# Patient Record
Sex: MALE | Race: WHITE | Employment: OTHER | URBAN - METROPOLITAN AREA
[De-identification: names, ages, dates, MRNs, and addresses within clinical notes are randomized per-mention and may not be internally consistent; named-entity substitution may affect disease eponyms.]

---

## 2018-02-28 ENCOUNTER — OFFICE VISIT (OUTPATIENT)
Dept: INTERNAL MEDICINE CLINIC | Age: 63
End: 2018-02-28

## 2018-02-28 VITALS — HEART RATE: 72 BPM | SYSTOLIC BLOOD PRESSURE: 122 MMHG | DIASTOLIC BLOOD PRESSURE: 82 MMHG | HEIGHT: 64 IN

## 2018-02-28 DIAGNOSIS — M10.9 GOUT WITHOUT TOPHUS: ICD-10-CM

## 2018-02-28 DIAGNOSIS — M48.062 SPINAL STENOSIS OF LUMBAR REGION WITH NEUROGENIC CLAUDICATION: Primary | ICD-10-CM

## 2018-02-28 DIAGNOSIS — K21.9 GASTROESOPHAGEAL REFLUX DISEASE WITHOUT ESOPHAGITIS: ICD-10-CM

## 2018-02-28 DIAGNOSIS — Z12.11 COLON CANCER SCREENING: ICD-10-CM

## 2018-02-28 DIAGNOSIS — Z12.5 PROSTATE CANCER SCREENING: ICD-10-CM

## 2018-02-28 DIAGNOSIS — G25.81 RESTLESS LEG: ICD-10-CM

## 2018-02-28 DIAGNOSIS — Z79.4 TYPE 2 DIABETES MELLITUS WITHOUT COMPLICATION, WITH LONG-TERM CURRENT USE OF INSULIN (HCC): ICD-10-CM

## 2018-02-28 DIAGNOSIS — I10 ESSENTIAL HYPERTENSION: ICD-10-CM

## 2018-02-28 DIAGNOSIS — F43.21 GRIEF: ICD-10-CM

## 2018-02-28 DIAGNOSIS — E78.5 HYPERLIPIDEMIA LDL GOAL <70: ICD-10-CM

## 2018-02-28 DIAGNOSIS — E11.9 TYPE 2 DIABETES MELLITUS WITHOUT COMPLICATION, WITH LONG-TERM CURRENT USE OF INSULIN (HCC): ICD-10-CM

## 2018-02-28 DIAGNOSIS — Z79.899 HIGH RISK MEDICATION USE: ICD-10-CM

## 2018-02-28 DIAGNOSIS — F32.9 REACTIVE DEPRESSION: ICD-10-CM

## 2018-02-28 DIAGNOSIS — M54.12 CERVICAL RADICULAR PAIN: ICD-10-CM

## 2018-02-28 DIAGNOSIS — D37.3 APPENDICEAL TUMOR: ICD-10-CM

## 2018-02-28 DIAGNOSIS — M50.30 DEGENERATIVE CERVICAL DISC: ICD-10-CM

## 2018-02-28 DIAGNOSIS — M51.36 DEGENERATIVE DISC DISEASE, LUMBAR: ICD-10-CM

## 2018-02-28 DIAGNOSIS — Z02.71 DISABILITY EXAMINATION: ICD-10-CM

## 2018-02-28 PROCEDURE — 99214 OFFICE O/P EST MOD 30 MIN: CPT | Performed by: INTERNAL MEDICINE

## 2018-02-28 RX ORDER — ROPINIROLE 1 MG/1
1 TABLET, FILM COATED ORAL 3 TIMES DAILY
COMMUNITY
End: 2019-04-08 | Stop reason: SDUPTHER

## 2018-02-28 RX ORDER — PHENOL 1.4 %
1 AEROSOL, SPRAY (ML) MUCOUS MEMBRANE DAILY
COMMUNITY

## 2018-02-28 RX ORDER — OMEPRAZOLE 40 MG/1
40 CAPSULE, DELAYED RELEASE ORAL DAILY
COMMUNITY
End: 2019-08-16 | Stop reason: SDUPTHER

## 2018-02-28 ASSESSMENT — PATIENT HEALTH QUESTIONNAIRE - PHQ9
SUM OF ALL RESPONSES TO PHQ QUESTIONS 1-9: 2
2. FEELING DOWN, DEPRESSED OR HOPELESS: 1
SUM OF ALL RESPONSES TO PHQ9 QUESTIONS 1 & 2: 2
1. LITTLE INTEREST OR PLEASURE IN DOING THINGS: 1

## 2018-02-28 ASSESSMENT — ENCOUNTER SYMPTOMS
CONSTIPATION: 0
DIARRHEA: 0
BACK PAIN: 1
SHORTNESS OF BREATH: 0
CHEST TIGHTNESS: 0

## 2018-02-28 NOTE — PROGRESS NOTES
Patient: Carol Li is a 57 y.o. male who presents today with the following Chief Complaint(s):  Chief Complaint   Patient presents with    Established New Doctor       56 y/o male comes in today to establish w/new practice. Well known to me from [facility]. He has not been seen in over 1 year as he has been caring for his wife with stage IV metastatic breast cancer with mets to brain who  on 18. He is dealing with his grief, has good support from friends and family. Is starting to return to hobbies. He became certified as a gun smith about 1 year ago and had stopped doing that the last few months of his wife's life but has recently started up again. He has not been doing his art. He had lost his appetite and lost quite a bit of weight during her illness, has some improvement in appetite. He is sad but doesn't feel he is any more depressed than he has been in the past. He denies feeling suicidal or thoughts of self harm. He does try to isolate himself but has friends who will not let him. He does not feel like he needs counseling. His biggest complaint today is that of low back pain. He needed to do a lot of moving/lifting of his wife in the last several weeks of her life and subsequently has been having some increased back pain bilaterally but right worse than left. Pain is burning/aching in nature with sharp pain in his right buttocks with radiation down into his right leg. No weakness. Has difficulty sitting and standing for prolonged periods of time (able to sit for about 1 hour but needs to shift frequently, able to stand for about 30 minutes, unable to comfortably lift more than  20 pounds). Pain interferes with his sleep. Prior to his wife's death, he was sleeping in his recliner. He now  Is trying to sleep in bed and finds that he is most comfortable on his right side. Does not take anything for pain, including OTC NSAIDs or Tylenol, and does not want anything for pain.  Pain is also exacerbated current facility-administered medications for this visit. Patient's past medical history, surgical history, family history, medications,  and allergies  were all reviewed and updated as appropriate today. Review of Systems   Constitutional: Positive for appetite change. Negative for fatigue and fever. Respiratory: Negative for chest tightness and shortness of breath. Cardiovascular: Negative for chest pain. Gastrointestinal: Negative for constipation and diarrhea. Musculoskeletal: Positive for back pain and neck pain. Skin: Negative for rash. Neurological: Positive for weakness and numbness. Psychiatric/Behavioral: Positive for dysphoric mood and sleep disturbance. Negative for agitation, confusion, self-injury and suicidal ideas. /82   Pulse 72   Ht 5' 4\" (1.626 m)   Physical Exam   Constitutional: He is oriented to person, place, and time. He appears well-developed and well-nourished. He is cooperative. He does not appear ill. No distress. HENT:   Right Ear: External ear normal.   Left Ear: External ear normal.   Nose: Nose normal.   Mouth/Throat: Oropharynx is clear and moist and mucous membranes are normal.   Eyes: Conjunctivae and EOM are normal. Pupils are equal, round, and reactive to light. Right eye exhibits no discharge. Left eye exhibits no discharge. No scleral icterus. Neck: Normal range of motion. Neck supple. Carotid bruit is not present. No thyroid mass and no thyromegaly present. Cardiovascular: Normal rate, regular rhythm, normal heart sounds and intact distal pulses. No murmur heard. Pulmonary/Chest: Effort normal. He has no wheezes. He has no rales. He exhibits no tenderness. Abdominal: Soft. Bowel sounds are normal. He exhibits no mass. There is no tenderness. Musculoskeletal: He exhibits no edema. Cervical back: He exhibits normal range of motion and no tenderness.         Lumbar back: He exhibits decreased range of motion and regarding these symptoms. No recent injury. Degenerative disc disease, lumbar     S/P microdiscectomy with Dr. Shelby Rao  7/3/14. Still has pain with radiation into right leg. Limits his ability to lift and remain seated or standing for prolonged periods of time. Pain does interfere with sleep. Declines pain meds. Recommend Aleve 2 pills twice a day and Tylenol (acetaminophen ) Extra Strength 500 mg 2 pills 3 x a day for pain. Essential hypertension     Well controlled on Diovan 320 mg po qd. No changes. Check CMP today. Relevant Medications    rosuvastatin (CRESTOR) 40 MG tablet    valsartan (DIOVAN) 320 MG tablet    fenofibrate (TRICOR) 145 MG tablet    Other Relevant Orders    CBC WITH AUTO DIFFERENTIAL    MAGNESIUM    VITAMIN B12    COMPREHENSIVE METABOLIC PANEL    Gastroesophageal reflux disease without esophagitis     Well controlled on omeprazole 40 mg po qd. Will check Mg and B12 level with labs today. Relevant Medications    omeprazole (PRILOSEC) 40 MG delayed release capsule    Gout without tophus     No recent flairs. Check uric acid level. Has been on allopurinol in the past. Initial presentation was in . Relevant Orders    URIC ACID    Grief     Wife, Lauren Diaz,  after 3 year jones with metastatic (liver, lung, bone, and ultimately brain) breast cancer (diagnosed 1/15,  18). Support given. Reviewed stages of grief. Sounds to be handling his grief appropriately. Encouraged to seek additional support if needed. High risk medication use     Check Mg and B12 d/t omeprazole, CMP d/t Crestor, Tricor, and Invoakamet. Hyperlipidemia LDL goal <70     Remains on Crestor 40 mg po qd and Tricor 145 mg po qd. Will check CMP and lipid panel today.          Relevant Medications    rosuvastatin (CRESTOR) 40 MG tablet    valsartan (DIOVAN) 320 MG tablet    fenofibrate (TRICOR) 145 MG tablet    Other Relevant Orders    LIPID PANEL    LIPID PANEL

## 2018-02-28 NOTE — PATIENT INSTRUCTIONS
or jaundice (yellowing of your skin or eyes). In rare cases, acetaminophen may cause a severe skin reaction. Stop taking this medicine and call your doctor right away if you have skin redness or a rash that spreads and causes blistering and peeling. What is acetaminophen? There are many brands and forms of acetaminophen available. Not all brands are listed on this leaflet. Acetaminophen is a pain reliever and a fever reducer. Acetaminophen is used to treat many conditions such as headache, muscle aches, arthritis, backache, toothaches, colds, and fevers. Acetaminophen may also be used for purposes not listed in this medication guide. What should I discuss with my healthcare provider before taking acetaminophen? You should not take acetaminophen if you are allergic to it, or if you have severe liver disease. Do not take acetaminophen without a doctor's advice if you have ever had alcoholic liver disease (cirrhosis) or if you drink more than 3 alcoholic beverages per day. You may not be able to take acetaminophen. Your doctor will determine whether acetaminophen is safe for you to use during pregnancy. Do not use this medicine without the advice of your doctor if you are pregnant. Acetaminophen can pass into breast milk and may harm a nursing baby. Tell your doctor if you are breast-feeding a baby. Do not give this medicine to a child younger than 3years old without the advice of a doctor. How should I take acetaminophen? Use exactly as directed on the label, or as prescribed by your doctor. Do not use in larger or smaller amounts or for longer than recommended. Do not take more of this medication than is recommended. An overdose of acetaminophen can damage your liver or cause death. Measure liquid medicine  with the dosing syringe provided, or with a special dose-measuring spoon or medicine cup. If you do not have a dose-measuring device, ask your pharmacist for one.   If you are treating a child, use a pediatric form of acetaminophen. Use only the special dose-measuring dropper or oral syringe that comes with the specific pediatric form you are using. Carefully follow the dosing directions on the medicine label. Acetaminophen made for infants is available in two different dose concentrations, and each concentration comes with its own medicine dropper or oral syringe. These dosing devices are not equal between the different concentrations. Using the wrong device may cause you to give your child an overdose of acetaminophen. Never mix and match dosing devices between infant formulations of acetaminophen. You may need to shake the liquid before each use. Follow the directions on the medicine label. The chewable tablet must be chewed thoroughly before you swallow it. Make sure your hands are dry when handling the acetaminophen disintegrating tablet. Place the tablet on your tongue. It will begin to dissolve right away. Do not swallow the tablet whole. Allow it to dissolve in your mouth without chewing. To use the acetaminophen effervescent granules, dissolve one packet of the granules in at least 4 ounces of water. Stir this mixture and drink all of it right away. To make sure you get the entire dose, add a little more water to the same glass, swirl gently and drink right away. Stop taking acetaminophen and call your doctor if:  · you still have a fever after 3 days of use;  · you still have pain after 7 days of use (or 5 days if treating a child);  · you have a skin rash, ongoing headache, or any redness or swelling; or  · if your symptoms get worse, or if you have any new symptoms. This medication can cause unusual results with certain lab tests for glucose (sugar) in the urine. Tell any doctor who treats you that you are using acetaminophen. Store at room temperature away from heat and moisture. What happens if I miss a dose?   Since acetaminophen is taken as needed, you may not be on a dosing also be used for purposes not listed in this medication guide. What should I discuss with my healthcare provider before taking acetaminophen? You should not take acetaminophen if you are allergic to it, or if you have severe liver disease. Do not take acetaminophen without a doctor's advice if you have ever had alcoholic liver disease (cirrhosis) or if you drink more than 3 alcoholic beverages per day. You may not be able to take acetaminophen. Your doctor will determine whether acetaminophen is safe for you to use during pregnancy. Do not use this medicine without the advice of your doctor if you are pregnant. Acetaminophen can pass into breast milk and may harm a nursing baby. Tell your doctor if you are breast-feeding a baby. Do not give this medicine to a child younger than 3years old without the advice of a doctor. How should I take acetaminophen? Use exactly as directed on the label, or as prescribed by your doctor. Do not use in larger or smaller amounts or for longer than recommended. Do not take more of this medication than is recommended. An overdose of acetaminophen can damage your liver or cause death. Measure liquid medicine  with the dosing syringe provided, or with a special dose-measuring spoon or medicine cup. If you do not have a dose-measuring device, ask your pharmacist for one. If you are treating a child, use a pediatric form of acetaminophen. Use only the special dose-measuring dropper or oral syringe that comes with the specific pediatric form you are using. Carefully follow the dosing directions on the medicine label. Acetaminophen made for infants is available in two different dose concentrations, and each concentration comes with its own medicine dropper or oral syringe. These dosing devices are not equal between the different concentrations. Using the wrong device may cause you to give your child an overdose of acetaminophen.  Never mix and match dosing devices between infant formulations of acetaminophen. You may need to shake the liquid before each use. Follow the directions on the medicine label. The chewable tablet must be chewed thoroughly before you swallow it. Make sure your hands are dry when handling the acetaminophen disintegrating tablet. Place the tablet on your tongue. It will begin to dissolve right away. Do not swallow the tablet whole. Allow it to dissolve in your mouth without chewing. To use the acetaminophen effervescent granules, dissolve one packet of the granules in at least 4 ounces of water. Stir this mixture and drink all of it right away. To make sure you get the entire dose, add a little more water to the same glass, swirl gently and drink right away. Stop taking acetaminophen and call your doctor if:  · you still have a fever after 3 days of use;  · you still have pain after 7 days of use (or 5 days if treating a child);  · you have a skin rash, ongoing headache, or any redness or swelling; or  · if your symptoms get worse, or if you have any new symptoms. This medication can cause unusual results with certain lab tests for glucose (sugar) in the urine. Tell any doctor who treats you that you are using acetaminophen. Store at room temperature away from heat and moisture. What happens if I miss a dose? Since acetaminophen is taken as needed, you may not be on a dosing schedule. If you are taking the medication regularly, take the missed dose as soon as you remember. Skip the missed dose if it is almost time for your next scheduled dose. Do not take extra medicine to make up the missed dose. What happens if I overdose? Seek emergency medical attention or call the Poison Help line at 1-786.661.7878. An overdose of acetaminophen can be fatal.   The first signs of an acetaminophen overdose include loss of appetite, nausea, vomiting, stomach pain, sweating, and confusion or weakness.  Later symptoms may include pain in your upper stomach, dark urine, and yellowing of your skin or the whites of your eyes. What should I avoid while taking acetaminophen? Ask a doctor or pharmacist before using any other cold, allergy, pain, or sleep medication. Acetaminophen (sometimes abbreviated as APAP) is contained in many combination medicines. Taking certain products together can cause you to get too much acetaminophen which can lead to a fatal overdose. Check the label to see if a medicine contains acetaminophen or APAP. Avoid drinking alcohol. It may increase your risk of liver damage while taking acetaminophen. What are the possible side effects of acetaminophen? Get emergency medical help if you have signs of an allergic reaction:  hives; difficulty breathing; swelling of your face, lips, tongue, or throat. In rare cases, acetaminophen may cause a severe skin reaction that can be fatal. This could occur even if you have taken acetaminophen in the past and had no reaction. Stop taking this medicine and call your doctor right away if you have skin redness or a rash that spreads and causes blistering and peeling. If you have this type of reaction, you should never again take any medicine that contains acetaminophen. Stop taking acetaminophen and call your doctor at once if you have:  · nausea, upper stomach pain, itching, loss of appetite;  · dark urine, gera-colored stools; or  · jaundice (yellowing of the skin or eyes). This is not a complete list of side effects and others may occur. Call your doctor for medical advice about side effects. You may report side effects to FDA at 1-489-FDA-3331. What other drugs will affect acetaminophen? Other drugs may interact with acetaminophen, including prescription and over-the-counter medicines, vitamins, and herbal products. Tell each of your health care providers about all medicines you use now and any medicine you start or stop using. Where can I get more information?   Your pharmacist can provide more information about

## 2018-03-02 RX ORDER — ROSUVASTATIN CALCIUM 40 MG/1
1 TABLET, COATED ORAL DAILY
COMMUNITY
Start: 2018-01-30 | End: 2019-04-08 | Stop reason: SDUPTHER

## 2018-03-02 RX ORDER — GLIPIZIDE 10 MG/1
1 TABLET, FILM COATED, EXTENDED RELEASE ORAL
COMMUNITY
Start: 2017-12-17 | End: 2018-12-07 | Stop reason: SINTOL

## 2018-03-02 RX ORDER — VALSARTAN 320 MG/1
1 TABLET ORAL DAILY
COMMUNITY
Start: 2018-01-30 | End: 2019-04-08 | Stop reason: SDUPTHER

## 2018-03-02 RX ORDER — FENOFIBRATE 145 MG/1
145 TABLET, COATED ORAL DAILY
Qty: 30 TABLET | Refills: 3
Start: 2018-03-02 | End: 2019-11-05 | Stop reason: ALTCHOICE

## 2018-03-02 RX ORDER — INSULIN GLARGINE 100 [IU]/ML
48 INJECTION, SOLUTION SUBCUTANEOUS NIGHTLY
COMMUNITY
Start: 2017-11-27 | End: 2018-08-07 | Stop reason: SDUPTHER

## 2018-03-02 RX ORDER — CITALOPRAM 40 MG/1
1 TABLET ORAL DAILY
COMMUNITY
Start: 2018-01-30 | End: 2019-04-08 | Stop reason: SDUPTHER

## 2018-03-02 NOTE — ASSESSMENT & PLAN NOTE
Overdue for labs. Will check HbA1C and CMP today. Remains on Lantus 44 units qhs, Invokamet 150/1000 mg po bid, Victoza 1.8 mg qd qd, and glipizide XL 10 mg 2 po qd.

## 2018-03-03 PROBLEM — M10.9 GOUT WITHOUT TOPHUS: Status: RESOLVED | Noted: 2018-02-28 | Resolved: 2018-03-03

## 2018-03-03 PROBLEM — Z02.71 DISABILITY EXAMINATION: Status: ACTIVE | Noted: 2018-03-03

## 2018-03-03 NOTE — ASSESSMENT & PLAN NOTE
Is mostly symptom-free but is having some positional symptoms of paresthesias in neck and hand and weak . Is really unable to lift more than 1 gallon of mild w/out difficulty. S/P cervical discectomy and fusion (11/13- C4-5 and 11/14)  x 2 and removal of hardware w/bone grafting (1/17) with Dr. Emily Elam. I have encouraged him to get in contact with Dr. Emily Elam regarding these symptoms. No recent injury.

## 2018-03-03 NOTE — ASSESSMENT & PLAN NOTE
Last colonoscopy was in October 2015. He will call Dr. Neda Heath office to see when next one is due.

## 2018-04-11 PROBLEM — Z12.5 PROSTATE CANCER SCREENING: Status: RESOLVED | Noted: 2018-02-28 | Resolved: 2018-04-11

## 2018-04-11 PROBLEM — Z12.11 COLON CANCER SCREENING: Status: RESOLVED | Noted: 2018-02-28 | Resolved: 2018-04-11

## 2018-08-07 ENCOUNTER — OFFICE VISIT (OUTPATIENT)
Dept: INTERNAL MEDICINE CLINIC | Age: 63
End: 2018-08-07

## 2018-08-07 VITALS
DIASTOLIC BLOOD PRESSURE: 78 MMHG | HEART RATE: 64 BPM | WEIGHT: 183.4 LBS | BODY MASS INDEX: 31.48 KG/M2 | SYSTOLIC BLOOD PRESSURE: 136 MMHG | RESPIRATION RATE: 12 BRPM

## 2018-08-07 DIAGNOSIS — E78.5 HYPERLIPIDEMIA LDL GOAL <70: ICD-10-CM

## 2018-08-07 DIAGNOSIS — F43.21 GRIEF: ICD-10-CM

## 2018-08-07 DIAGNOSIS — Z91.14 NON COMPLIANCE W MEDICATION REGIMEN: ICD-10-CM

## 2018-08-07 DIAGNOSIS — Z79.4 TYPE 2 DIABETES MELLITUS WITHOUT COMPLICATION, WITH LONG-TERM CURRENT USE OF INSULIN (HCC): Primary | ICD-10-CM

## 2018-08-07 DIAGNOSIS — E11.9 TYPE 2 DIABETES MELLITUS WITHOUT COMPLICATION, WITH LONG-TERM CURRENT USE OF INSULIN (HCC): Primary | ICD-10-CM

## 2018-08-07 DIAGNOSIS — F32.9 REACTIVE DEPRESSION: ICD-10-CM

## 2018-08-07 DIAGNOSIS — M10.9 GOUT WITHOUT TOPHUS: ICD-10-CM

## 2018-08-07 DIAGNOSIS — I10 ESSENTIAL HYPERTENSION: ICD-10-CM

## 2018-08-07 DIAGNOSIS — G25.81 RESTLESS LEG: ICD-10-CM

## 2018-08-07 LAB — HBA1C MFR BLD: 13.4 %

## 2018-08-07 PROCEDURE — 1036F TOBACCO NON-USER: CPT | Performed by: INTERNAL MEDICINE

## 2018-08-07 PROCEDURE — 1101F PT FALLS ASSESS-DOCD LE1/YR: CPT | Performed by: INTERNAL MEDICINE

## 2018-08-07 PROCEDURE — 3017F COLORECTAL CA SCREEN DOC REV: CPT | Performed by: INTERNAL MEDICINE

## 2018-08-07 PROCEDURE — 4040F PNEUMOC VAC/ADMIN/RCVD: CPT | Performed by: INTERNAL MEDICINE

## 2018-08-07 PROCEDURE — G8427 DOCREV CUR MEDS BY ELIG CLIN: HCPCS | Performed by: INTERNAL MEDICINE

## 2018-08-07 PROCEDURE — 99214 OFFICE O/P EST MOD 30 MIN: CPT | Performed by: INTERNAL MEDICINE

## 2018-08-07 PROCEDURE — G8417 CALC BMI ABV UP PARAM F/U: HCPCS | Performed by: INTERNAL MEDICINE

## 2018-08-07 PROCEDURE — 3046F HEMOGLOBIN A1C LEVEL >9.0%: CPT | Performed by: INTERNAL MEDICINE

## 2018-08-07 PROCEDURE — 2022F DILAT RTA XM EVC RTNOPTHY: CPT | Performed by: INTERNAL MEDICINE

## 2018-08-07 PROCEDURE — 1123F ACP DISCUSS/DSCN MKR DOCD: CPT | Performed by: INTERNAL MEDICINE

## 2018-08-07 PROCEDURE — 83036 HEMOGLOBIN GLYCOSYLATED A1C: CPT | Performed by: INTERNAL MEDICINE

## 2018-08-07 RX ORDER — INSULIN GLARGINE 100 [IU]/ML
10 INJECTION, SOLUTION SUBCUTANEOUS NIGHTLY
Qty: 15 PEN | Refills: 3 | Status: SHIPPED | OUTPATIENT
Start: 2018-08-07 | End: 2019-10-22 | Stop reason: SDUPTHER

## 2018-08-07 ASSESSMENT — ENCOUNTER SYMPTOMS
CHEST TIGHTNESS: 0
SHORTNESS OF BREATH: 0
CONSTIPATION: 0
DIARRHEA: 0

## 2018-08-07 NOTE — PROGRESS NOTES
Patient: Artemio Jackman is a 70 y.o. male who presents today with the following Chief Complaint(s):  Chief Complaint   Patient presents with    3 Month Follow-Up    Diabetes       Here for follow up- has lost 18 pounds since February- in part intentional and in part grief. Forgets to eat when he gets busy working. 1. DM- sugars have been \"horrible\"- has not been taking his medications regularly. Has had to travel to [place] and forgot his medications. When he does remember to use his medications, sugars are getter. 2. Grief- really struggling but is doing better. Crying less. Has supportive friends in . [place] [address]. Has been getting support. 3. Hyperlipidemia- LDL was 112; has not been taking his medication regularly. 4. HTN- not checking his blood pressure  5. Anxiety and depression- doing ok with Celexa  6. RLS- well controlled with Requip    Back and neck are doing ok for the most part. Did go on a roller coaster recently and that did aggravate his neck and gave him a headache. As long as he is not lifting he does ok. Is having difficulty remembering to take his medications, typically takes them all at once at bedtime and this works for him. [facility] \" a [facility]\". Labs reviewed from [facility]: creatinine 0.7 ; LFT's normal. Total cholesterol 201; ; HDL 42; ; uric acid 6.2; fasting 193; HbA1c not done. No Known Allergies   Past Medical History:   Diagnosis Date    Appendiceal tumor     Asthma     Cervical spinal stenosis     Degenerative disc disease, lumbar     history of herniated disc at L4-5 with L5 radiculopathy.  pt had L4-5 discectomy with Dr Jennifer Gagnon    Diabetes mellitus ([facility] [address]     Esophageal reflux     Gout     Gross hematuria     Hand cramp     Hemorrhoid     Hepatitis     High blood pressure     Hyperlipidemia     Lumbar herniated disc     Major depressive disorder, recurrent episode, mild (HCC)     Postnasal drip     Restless leg syndrome     absent  Skin lesions/pre-ulcerative calluses: absent  Edema: right- negative, left- negative    Sensory exam:  Monofilament sensation: normal  (minimum of 5 random plantar locations tested, avoiding callused areas - > 1 area with absence of sensation is + for neuropathy)    Pulses: normal- 2+/4 DP      ASSESSMENT/PLAN:    Problem List Items Addressed This Visit     Type 2 diabetes mellitus without complication, with long-term current use of insulin ([facility] [address] - Primary     Overdue for labs. Will check HbA1C and CMP today. Has been non-compliant with medications, not taking Lantus 48 units qhs for several months; Invokamet 150/1000 mg po bid, Victoza 1.8 mg qd qd, and glipizide XL 10 mg 2 po qd have been intermittent. He has lost 18 pounds since February so hopefully this will help with his diabetes control. He does also tend to take all of his medication at bedtime. I have given him a schedule and discussed the importance of taking diabetes medication with food to avoid hypoglycemia. Relevant Medications    LANTUS SOLOSTAR 100 UNIT/ML injection pen    Liraglutide (VICTOZA) 18 MG/3ML SOPN SC injection    Other Relevant Orders    POCT glycosylated hemoglobin (Hb A1C) (Completed)    Hemoglobin A1C    Comprehensive Metabolic Panel    Microalbumin / Creatinine Urine Ratio    Essential hypertension     Well controlled on Diovan 320 mg po qd. Hyperlipidemia LDL goal <70     Remains on Crestor 40 mg po qd and Tricor 145 mg po qd. Will check CMP and lipid panel today. Has been intermittently compliant. Relevant Orders    Lipid Panel    Grief     Doing a little better. Support given. Is impacting his compliance with medication. May consider counseling if his grief continues to impact his self-care. Has also lost 18 pounds since late-February, in part due to not eating well and also likely related to poorly controlled diabetes. Restless leg     Well controlled on Requip.  Likely relates also

## 2018-08-12 PROBLEM — Z91.14 NON COMPLIANCE W MEDICATION REGIMEN: Status: ACTIVE | Noted: 2018-08-12

## 2018-08-12 PROBLEM — Z91.148 NON COMPLIANCE W MEDICATION REGIMEN: Status: ACTIVE | Noted: 2018-08-12

## 2018-08-12 NOTE — ASSESSMENT & PLAN NOTE
Doing ok now but is compounded by his grief. He has found activities that do help him. He has been non-compliant with medication and self-care as part of his depression and grief. Continue Celexa 40 mg po qd.

## 2018-08-12 NOTE — ASSESSMENT & PLAN NOTE
Overdue for labs. Will check HbA1C and CMP today. Has been non-compliant with medications, not taking Lantus 48 units qhs for several months; Invokamet 150/1000 mg po bid, Victoza 1.8 mg qd qd, and glipizide XL 10 mg 2 po qd have been intermittent. He has lost 18 pounds since February so hopefully this will help with his diabetes control. He does also tend to take all of his medication at bedtime. I have given him a schedule and discussed the importance of taking diabetes medication with food to avoid hypoglycemia.

## 2018-08-12 NOTE — ASSESSMENT & PLAN NOTE
Remains on Crestor 40 mg po qd and Tricor 145 mg po qd. Will check CMP and lipid panel today. Has been intermittently compliant.

## 2018-12-07 ENCOUNTER — OFFICE VISIT (OUTPATIENT)
Dept: INTERNAL MEDICINE CLINIC | Age: 63
End: 2018-12-07
Payer: MEDICARE

## 2018-12-07 VITALS
HEART RATE: 78 BPM | DIASTOLIC BLOOD PRESSURE: 76 MMHG | WEIGHT: 196 LBS | RESPIRATION RATE: 12 BRPM | BODY MASS INDEX: 33.64 KG/M2 | SYSTOLIC BLOOD PRESSURE: 138 MMHG

## 2018-12-07 DIAGNOSIS — Z23 NEED FOR VACCINATION AGAINST STREPTOCOCCUS PNEUMONIAE USING PNEUMOCOCCAL CONJUGATE VACCINE 13: ICD-10-CM

## 2018-12-07 DIAGNOSIS — E11.9 TYPE 2 DIABETES MELLITUS WITHOUT COMPLICATION, WITH LONG-TERM CURRENT USE OF INSULIN (HCC): Primary | ICD-10-CM

## 2018-12-07 DIAGNOSIS — F32.9 REACTIVE DEPRESSION: ICD-10-CM

## 2018-12-07 DIAGNOSIS — E78.5 HYPERLIPIDEMIA LDL GOAL <70: ICD-10-CM

## 2018-12-07 DIAGNOSIS — I10 ESSENTIAL HYPERTENSION: ICD-10-CM

## 2018-12-07 DIAGNOSIS — D37.3 APPENDICEAL TUMOR: ICD-10-CM

## 2018-12-07 DIAGNOSIS — G47.33 OSA (OBSTRUCTIVE SLEEP APNEA): ICD-10-CM

## 2018-12-07 DIAGNOSIS — F43.21 GRIEF: ICD-10-CM

## 2018-12-07 DIAGNOSIS — Z12.5 PROSTATE CANCER SCREENING: ICD-10-CM

## 2018-12-07 DIAGNOSIS — Z79.4 TYPE 2 DIABETES MELLITUS WITHOUT COMPLICATION, WITH LONG-TERM CURRENT USE OF INSULIN (HCC): Primary | ICD-10-CM

## 2018-12-07 DIAGNOSIS — Z91.14 NON COMPLIANCE W MEDICATION REGIMEN: ICD-10-CM

## 2018-12-07 PROCEDURE — G8482 FLU IMMUNIZE ORDER/ADMIN: HCPCS | Performed by: INTERNAL MEDICINE

## 2018-12-07 PROCEDURE — 90670 PCV13 VACCINE IM: CPT | Performed by: INTERNAL MEDICINE

## 2018-12-07 PROCEDURE — 1036F TOBACCO NON-USER: CPT | Performed by: INTERNAL MEDICINE

## 2018-12-07 PROCEDURE — 1123F ACP DISCUSS/DSCN MKR DOCD: CPT | Performed by: INTERNAL MEDICINE

## 2018-12-07 PROCEDURE — 1101F PT FALLS ASSESS-DOCD LE1/YR: CPT | Performed by: INTERNAL MEDICINE

## 2018-12-07 PROCEDURE — 3017F COLORECTAL CA SCREEN DOC REV: CPT | Performed by: INTERNAL MEDICINE

## 2018-12-07 PROCEDURE — G0009 ADMIN PNEUMOCOCCAL VACCINE: HCPCS | Performed by: INTERNAL MEDICINE

## 2018-12-07 PROCEDURE — G8427 DOCREV CUR MEDS BY ELIG CLIN: HCPCS | Performed by: INTERNAL MEDICINE

## 2018-12-07 PROCEDURE — 99214 OFFICE O/P EST MOD 30 MIN: CPT | Performed by: INTERNAL MEDICINE

## 2018-12-07 PROCEDURE — 4040F PNEUMOC VAC/ADMIN/RCVD: CPT | Performed by: INTERNAL MEDICINE

## 2018-12-07 PROCEDURE — G8417 CALC BMI ABV UP PARAM F/U: HCPCS | Performed by: INTERNAL MEDICINE

## 2018-12-07 PROCEDURE — 2022F DILAT RTA XM EVC RTNOPTHY: CPT | Performed by: INTERNAL MEDICINE

## 2018-12-07 PROCEDURE — 3046F HEMOGLOBIN A1C LEVEL >9.0%: CPT | Performed by: INTERNAL MEDICINE

## 2018-12-07 ASSESSMENT — ENCOUNTER SYMPTOMS
SHORTNESS OF BREATH: 0
DIARRHEA: 0
CONSTIPATION: 0
CHEST TIGHTNESS: 0

## 2018-12-12 NOTE — ASSESSMENT & PLAN NOTE
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 Akin Gomes. Non-cancerous. Last colonoscopy was in October 2015. Will check with Dr. Marylou Keyes when is due for his next colonoscopy.

## 2018-12-12 NOTE — ASSESSMENT & PLAN NOTE
Patient is noncompliant with his medications. He has always had difficulties with compliance but they have been worse since his wife became ill and then . The importance of compliance has been stressed.

## 2019-02-14 ENCOUNTER — TELEPHONE (OUTPATIENT)
Dept: INTERNAL MEDICINE CLINIC | Age: 64
End: 2019-02-14

## 2019-04-08 ENCOUNTER — OFFICE VISIT (OUTPATIENT)
Dept: INTERNAL MEDICINE CLINIC | Age: 64
End: 2019-04-08
Payer: MEDICARE

## 2019-04-08 VITALS
SYSTOLIC BLOOD PRESSURE: 116 MMHG | BODY MASS INDEX: 33.37 KG/M2 | WEIGHT: 194.4 LBS | DIASTOLIC BLOOD PRESSURE: 74 MMHG | HEART RATE: 72 BPM | OXYGEN SATURATION: 98 %

## 2019-04-08 DIAGNOSIS — G25.81 RESTLESS LEG: ICD-10-CM

## 2019-04-08 DIAGNOSIS — Z79.4 TYPE 2 DIABETES MELLITUS WITHOUT COMPLICATION, WITH LONG-TERM CURRENT USE OF INSULIN (HCC): Primary | ICD-10-CM

## 2019-04-08 DIAGNOSIS — G47.33 OSA (OBSTRUCTIVE SLEEP APNEA): ICD-10-CM

## 2019-04-08 DIAGNOSIS — E78.5 HYPERLIPIDEMIA LDL GOAL <70: ICD-10-CM

## 2019-04-08 DIAGNOSIS — E11.9 TYPE 2 DIABETES MELLITUS WITHOUT COMPLICATION, WITH LONG-TERM CURRENT USE OF INSULIN (HCC): Primary | ICD-10-CM

## 2019-04-08 DIAGNOSIS — Z12.5 SCREENING FOR PROSTATE CANCER: ICD-10-CM

## 2019-04-08 DIAGNOSIS — F32.9 REACTIVE DEPRESSION: ICD-10-CM

## 2019-04-08 DIAGNOSIS — D37.3 APPENDICEAL TUMOR: ICD-10-CM

## 2019-04-08 DIAGNOSIS — F43.21 GRIEF: ICD-10-CM

## 2019-04-08 DIAGNOSIS — I10 ESSENTIAL HYPERTENSION: ICD-10-CM

## 2019-04-08 PROCEDURE — 1123F ACP DISCUSS/DSCN MKR DOCD: CPT | Performed by: INTERNAL MEDICINE

## 2019-04-08 PROCEDURE — G8417 CALC BMI ABV UP PARAM F/U: HCPCS | Performed by: INTERNAL MEDICINE

## 2019-04-08 PROCEDURE — G8427 DOCREV CUR MEDS BY ELIG CLIN: HCPCS | Performed by: INTERNAL MEDICINE

## 2019-04-08 PROCEDURE — 3046F HEMOGLOBIN A1C LEVEL >9.0%: CPT | Performed by: INTERNAL MEDICINE

## 2019-04-08 PROCEDURE — 2022F DILAT RTA XM EVC RTNOPTHY: CPT | Performed by: INTERNAL MEDICINE

## 2019-04-08 PROCEDURE — 1036F TOBACCO NON-USER: CPT | Performed by: INTERNAL MEDICINE

## 2019-04-08 PROCEDURE — 3017F COLORECTAL CA SCREEN DOC REV: CPT | Performed by: INTERNAL MEDICINE

## 2019-04-08 PROCEDURE — 99214 OFFICE O/P EST MOD 30 MIN: CPT | Performed by: INTERNAL MEDICINE

## 2019-04-08 PROCEDURE — 4040F PNEUMOC VAC/ADMIN/RCVD: CPT | Performed by: INTERNAL MEDICINE

## 2019-04-08 RX ORDER — ROPINIROLE 1 MG/1
1 TABLET, FILM COATED ORAL 3 TIMES DAILY
Qty: 270 TABLET | Refills: 3 | Status: SHIPPED | OUTPATIENT
Start: 2019-04-08 | End: 2020-02-24 | Stop reason: SDUPTHER

## 2019-04-08 RX ORDER — CITALOPRAM 40 MG/1
40 TABLET ORAL DAILY
Qty: 90 TABLET | Refills: 3 | Status: SHIPPED | OUTPATIENT
Start: 2019-04-08 | End: 2020-02-24 | Stop reason: SDUPTHER

## 2019-04-08 RX ORDER — PIOGLITAZONEHYDROCHLORIDE 15 MG/1
15 TABLET ORAL DAILY
Qty: 30 TABLET | Refills: 3 | Status: SHIPPED | OUTPATIENT
Start: 2019-04-08 | End: 2019-05-06 | Stop reason: SDUPTHER

## 2019-04-08 RX ORDER — ROSUVASTATIN CALCIUM 40 MG/1
40 TABLET, COATED ORAL DAILY
Qty: 90 TABLET | Refills: 3 | Status: SHIPPED | OUTPATIENT
Start: 2019-04-08 | End: 2020-02-24 | Stop reason: SDUPTHER

## 2019-04-08 RX ORDER — VALSARTAN 320 MG/1
320 TABLET ORAL DAILY
Qty: 90 TABLET | Refills: 3 | Status: SHIPPED | OUTPATIENT
Start: 2019-04-08 | End: 2020-02-24 | Stop reason: SDUPTHER

## 2019-04-08 ASSESSMENT — ENCOUNTER SYMPTOMS
BACK PAIN: 1
DIARRHEA: 0
CHEST TIGHTNESS: 0
CONSTIPATION: 0
SHORTNESS OF BREATH: 0

## 2019-04-08 NOTE — ASSESSMENT & PLAN NOTE
Is now doing grief counseling through grief workshop through a [employer]. Is finding this helpful.

## 2019-04-08 NOTE — PROGRESS NOTES
Patient: Lizette Lara is a 70 y.o. male who presents today with the following Chief Complaint(s):  Chief Complaint   Patient presents with    Check-Up     4 mth [place]        HPI     Here today for follow up. Is doing \"OK\". Depression/grief- Has been going to grief counseling through a [employer]. Is a grief group. Is finding it helpful. Is taking Celexa 40 mg and is working well for him. DM 2- HbA1c 10.8. Has not been able to get his Invokamet d/t cost. Cannot use coupons d/t being on Medicare. Has not been taking Invokamet but has been taking Metformin 500 mg  BID, glipizide XL 10 mg BIDk  and Lantus (48 units) and morning sugars have been running around 180-190. Is still only eating once a day. Forgets to eat during the day. Does make sure to always wear shoes. CMP normal, creatinine 0.9., LFT normal, K+=4/2    Hyperlipidemia- , , HDL 39, T. Chol 293. Has not been taking Crestor as he thought it was recalled. HTN- has been taking losartan instead of valsartan d/t recall. BP is well controlled. MED- does have CPAP but not using as the mask he has does not work for him. Continues with chronic low back pain, worse with prolonged sitting. Does get pain down his leg. Does not take anything, doesn't want to take anything. Neck is ok for the most part but will occasionally get pain and numbness in his fingers when he is working and has his head bent over, better when he straightens his head. RLS- Requip is working well for him. Appendiceal Tumor- due for f/u colonoscopy next year. On 5 year plan. No Known Allergies     TSH 3.320  Past Medical History:   Diagnosis Date    Appendiceal tumor     Asthma     Cervical spinal stenosis     Degenerative disc disease, lumbar     history of herniated disc at L4-5 with L5 radiculopathy.  pt had L4-5 discectomy with Dr Abhi Downs    Diabetes mellitus ([facility] [address]     Esophageal reflux     Gout     Gross hematuria     Hand cramp     Hemorrhoid     Hepatitis     High blood pressure     Hyperlipidemia     Lumbar herniated disc     Major depressive disorder, recurrent episode, mild (HCC)     Postnasal drip     Restless leg syndrome     Seasonal allergies     Unilateral hearing loss       Past Surgical History:   Procedure Laterality Date    APPENDECTOMY  May 5, 2008    [facility]GERY      [facility]    NECK SURGERY      [facility]      Social History     Socioeconomic History    Marital status:      Spouse name: Not on file    Number of children: Not on file    Years of education: Not on file    Highest education level: Not on file   Occupational History    Occupation:    Social Needs    Financial resource strain: Not on file    Food insecurity:     Worry: Not on file     Inability: Not on file    Transportation needs:     Medical: Not on file     Non-medical: Not on file   Tobacco Use    Smoking status: Never Smoker    Smokeless tobacco: Never Used   Substance and Sexual Activity    Alcohol use:  Yes     Alcohol/week: 1.2 - 4.2 oz     Types: 2 - 7 Cans of beer per week    Drug use: No    Sexual activity: Not on file   Lifestyle    Physical activity:     Days per week: Not on file     Minutes per session: Not on file    Stress: Not on file   Relationships    Social connections:     Talks on phone: Not on file     Gets together: Not on file     Attends Sabianist service: Not on file     Active member of club or organization: Not on file     Attends meetings of clubs or organizations: Not on file     Relationship status: Not on file    Intimate partner violence:     Fear of current or ex partner: Not on file     Emotionally abused: Not on file     Physically abused: Not on file     Forced sexual activity: Not on file   Other Topics Concern    Not on file   Social History Narrative    Not on file     Family History   Problem Relation Age of Onset    Cancer Mother     Cancer Father     Heart Disease Father     High Blood Pressure Father     Diabetes Sister         Outpatient Medications Prior to Visit   Medication Sig Dispense Refill    LANTUS SOLOSTAR 100 UNIT/ML injection pen Inject 10 Units into the skin nightly 15 pen 3    fenofibrate (TRICOR) 145 MG tablet Take 1 tablet by mouth daily 30 tablet 3    omeprazole (PRILOSEC) 40 MG delayed release capsule Take 40 mg by mouth daily      calcium carbonate 600 MG TABS tablet Take 1 tablet by mouth daily      canagliflozin-metformin HCl (INVOKAMET) 150-1000 MG Take 1 tablet by mouth 2 times daily (with meals) 60 tablet 3    citalopram (CELEXA) 40 MG tablet Take 1 tablet by mouth daily      rosuvastatin (CRESTOR) 40 MG tablet Take 1 tablet by mouth daily      valsartan (DIOVAN) 320 MG tablet Take 1 tablet by mouth daily      rOPINIRole (REQUIP) 1 MG tablet Take 1 mg by mouth 3 times daily       No facility-administered medications prior to visit. Patient'spast medical history, surgical history, family history, medications,  and allergies  were all reviewed and updated as appropriate today. Review of Systems   Constitutional: Negative for appetite change, fatigue and fever. Respiratory: Negative for chest tightness and shortness of breath. Cardiovascular: Positive for chest pain (non-exertional; notices pain when he is reclining, positional (rib-related)). Gastrointestinal: Negative for constipation and diarrhea. Musculoskeletal: Positive for back pain and neck pain. Skin: Negative for rash. /74   Pulse 72   Wt 194 lb 6.4 oz (88.2 kg)   SpO2 98%   BMI 33.37 kg/m²   Physical Exam   Constitutional: He is oriented to person, place, and time. He appears well-developed and well-nourished. He is cooperative. He does not appear ill. No distress. HENT:   Head: Normocephalic.    Right Ear: Tympanic membrane, external ear and ear canal normal.   Left Ear: Tympanic membrane, external ear and ear canal normal.   Nose: Nose normal. No mucosal edema or rhinorrhea. Mouth/Throat: Oropharynx is clear and moist and mucous membranes are normal.   Eyes: Pupils are equal, round, and reactive to light. Conjunctivae and EOM are normal. Right eye exhibits no discharge. Left eye exhibits no discharge. No scleral icterus. Neck: Normal range of motion. Neck supple. Carotid bruit is not present. No thyroid mass and no thyromegaly present. Cardiovascular: Normal rate, regular rhythm, normal heart sounds and intact distal pulses. No murmur heard. Pulses:       Dorsalis pedis pulses are 2+ on the right side, and 2+ on the left side. No LE edema   Pulmonary/Chest: Effort normal. He has no wheezes. He has no rales. He exhibits no tenderness. Abdominal: Soft. Bowel sounds are normal. He exhibits no mass. There is no tenderness. Musculoskeletal:        Right foot: There is normal range of motion and no deformity. Left foot: There is normal range of motion and no deformity. Feet:   Right Foot:   Protective Sensation: 10 sites tested. 10 sites sensed. Skin Integrity: Negative for ulcer, blister, skin breakdown, erythema, warmth, callus or dry skin. Left Foot:   Protective Sensation: 10 sites tested. 10 sites sensed. Skin Integrity: Negative for ulcer, blister, skin breakdown, erythema, warmth, callus or dry skin. Lymphadenopathy:     He has no cervical adenopathy. Neurological: He is alert and oriented to person, place, and time. Skin: Skin is warm and dry. No pallor. Psychiatric: He has a normal mood and affect. His behavior is normal. Judgment and thought content normal.       ASSESSMENT/PLAN:    Problem List Items Addressed This Visit     Type 2 diabetes mellitus without complication, with long-term current use of insulin ([address] - Primary     Is no longer taking Invokamet due to cost.  Change metformin to metformin  mg 2 q. day, continue glipizide XL 10 mg b.i.d., add Actos 15 mg q. day. Increase Lantus from 48-52 units. Increase by 2 units week until fasting sugars are consistently under 150. Relevant Medications    pioglitazone (ACTOS) 15 MG tablet    Other Relevant Orders    Comprehensive Metabolic Panel    Hemoglobin A1C    Microalbumin / Creatinine Urine Ratio    Essential hypertension     Well-controlled. He is now taking losartan and place of valsartan. He is not aware of the dose of losartan. Relevant Medications    rosuvastatin (CRESTOR) 40 MG tablet    valsartan (DIOVAN) 320 MG tablet    Other Relevant Orders    Comprehensive Metabolic Panel    CBC Auto Differential    Hyperlipidemia LDL goal <70     Uncontrolled. He did stop Crestor as he thought it have been recalled. Resume Crestor 40 mg q. day. 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 Karine Reynolds. Non-cancerous. Last colonoscopy was in October 2015. Is due for follow-up colonoscopy next year. Grief     Is now doing grief counseling through grief workshop through a [employer]. Is finding this helpful. Restless leg     Continue Requip. Relevant Medications    rOPINIRole (REQUIP) 1 MG tablet    Reactive depression     Doing fair on Celexa 40 mg q. day. Continue. A lot of this relates to his grief but his depression also predated his wife's illness. Relevant Medications    citalopram (CELEXA) 40 MG tablet    MED (obstructive sleep apnea)     Diagnosed by a friend's sister and another state. Refer to local sleep management as he is having difficulty finding a mask that works for him.          Relevant Orders    Linda Carpio MD, Sleep Medicine, [facility]      Other Visit Diagnoses     Screening for prostate cancer        Relevant Orders    Psa screening          Current Outpatient Medications   Medication Sig Dispense Refill    rosuvastatin (CRESTOR) 40 MG tablet Take 1 tablet by mouth daily 90 tablet 3    valsartan (DIOVAN) 320 MG tablet Take 1 tablet by mouth daily 90 tablet 3    citalopram (CELEXA) 40 MG tablet Take 1 tablet by mouth daily 90 tablet 3    pioglitazone (ACTOS) 15 MG tablet Take 1 tablet by mouth daily 30 tablet 3    rOPINIRole (REQUIP) 1 MG tablet Take 1 tablet by mouth 3 times daily 270 tablet 3    LANTUS SOLOSTAR 100 UNIT/ML injection pen Inject 10 Units into the skin nightly 15 pen 3    fenofibrate (TRICOR) 145 MG tablet Take 1 tablet by mouth daily 30 tablet 3    omeprazole (PRILOSEC) 40 MG delayed release capsule Take 40 mg by mouth daily      calcium carbonate 600 MG TABS tablet Take 1 tablet by mouth daily       No current facility-administered medications for this visit. Return in about 3 months (around 7/8/2019) for labs prior.

## 2019-04-08 NOTE — ASSESSMENT & PLAN NOTE
Diagnosed by a friend's sister and another state. Refer to local sleep management as he is having difficulty finding a mask that works for him.

## 2019-04-08 NOTE — PATIENT INSTRUCTIONS
Increase Lantus to 52 units once a day. Goal is to have fasting sugars under 150. If after 1 week you are not under 150, increase to 54 units. If you do not get under 150 on 54 units after 1 week, increase to 56 units. Change Metformin ER to 750 mg 2 pills per day with meal.     Continue glipizide XL 10 mg twice a day with meals (breakfast and dinner).     Add Actos (pioglitizone) 15 mg daily with a meal.

## 2019-04-08 NOTE — ASSESSMENT & PLAN NOTE
Doing fair on Celexa 40 mg q. day. Continue. A lot of this relates to his grief but his depression also predated his wife's illness.

## 2019-04-10 ENCOUNTER — TELEPHONE (OUTPATIENT)
Dept: RHEUMATOLOGY | Age: 64
End: 2019-04-10

## 2019-05-06 DIAGNOSIS — E11.9 TYPE 2 DIABETES MELLITUS WITHOUT COMPLICATION, WITH LONG-TERM CURRENT USE OF INSULIN (HCC): ICD-10-CM

## 2019-05-06 DIAGNOSIS — Z79.4 TYPE 2 DIABETES MELLITUS WITHOUT COMPLICATION, WITH LONG-TERM CURRENT USE OF INSULIN (HCC): ICD-10-CM

## 2019-05-07 RX ORDER — PIOGLITAZONEHYDROCHLORIDE 15 MG/1
15 TABLET ORAL DAILY
Qty: 90 TABLET | Refills: 3 | Status: SHIPPED | OUTPATIENT
Start: 2019-05-07 | End: 2019-10-03

## 2019-05-09 ENCOUNTER — OFFICE VISIT (OUTPATIENT)
Dept: SLEEP MEDICINE | Age: 64
End: 2019-05-09
Payer: MEDICARE

## 2019-05-09 VITALS
RESPIRATION RATE: 16 BRPM | SYSTOLIC BLOOD PRESSURE: 124 MMHG | WEIGHT: 197.8 LBS | TEMPERATURE: 98.6 F | DIASTOLIC BLOOD PRESSURE: 82 MMHG | OXYGEN SATURATION: 97 % | BODY MASS INDEX: 33.77 KG/M2 | HEART RATE: 71 BPM | HEIGHT: 64 IN

## 2019-05-09 DIAGNOSIS — I10 ESSENTIAL HYPERTENSION: ICD-10-CM

## 2019-05-09 DIAGNOSIS — G47.33 OSA (OBSTRUCTIVE SLEEP APNEA): Primary | ICD-10-CM

## 2019-05-09 PROCEDURE — 99203 OFFICE O/P NEW LOW 30 MIN: CPT | Performed by: PSYCHIATRY & NEUROLOGY

## 2019-05-09 PROCEDURE — 4040F PNEUMOC VAC/ADMIN/RCVD: CPT | Performed by: PSYCHIATRY & NEUROLOGY

## 2019-05-09 PROCEDURE — 1123F ACP DISCUSS/DSCN MKR DOCD: CPT | Performed by: PSYCHIATRY & NEUROLOGY

## 2019-05-09 PROCEDURE — G8417 CALC BMI ABV UP PARAM F/U: HCPCS | Performed by: PSYCHIATRY & NEUROLOGY

## 2019-05-09 PROCEDURE — G8427 DOCREV CUR MEDS BY ELIG CLIN: HCPCS | Performed by: PSYCHIATRY & NEUROLOGY

## 2019-05-09 PROCEDURE — 1036F TOBACCO NON-USER: CPT | Performed by: PSYCHIATRY & NEUROLOGY

## 2019-05-09 PROCEDURE — 3017F COLORECTAL CA SCREEN DOC REV: CPT | Performed by: PSYCHIATRY & NEUROLOGY

## 2019-05-09 ASSESSMENT — SLEEP AND FATIGUE QUESTIONNAIRES
NECK CIRCUMFERENCE (INCHES): 18.5
HOW LIKELY ARE YOU TO NOD OFF OR FALL ASLEEP WHILE SITTING INACTIVE IN A PUBLIC PLACE: 2
HOW LIKELY ARE YOU TO NOD OFF OR FALL ASLEEP WHILE WATCHING TV: 1
HOW LIKELY ARE YOU TO NOD OFF OR FALL ASLEEP IN A CAR, WHILE STOPPED FOR A FEW MINUTES IN TRAFFIC: 1
HOW LIKELY ARE YOU TO NOD OFF OR FALL ASLEEP WHILE SITTING AND TALKING TO SOMEONE: 1
HOW LIKELY ARE YOU TO NOD OFF OR FALL ASLEEP WHILE SITTING QUIETLY AFTER LUNCH WITHOUT ALCOHOL: 1
HOW LIKELY ARE YOU TO NOD OFF OR FALL ASLEEP WHILE SITTING AND READING: 3
ESS TOTAL SCORE: 13
HOW LIKELY ARE YOU TO NOD OFF OR FALL ASLEEP WHEN YOU ARE A PASSENGER IN A CAR FOR AN HOUR WITHOUT A BREAK: 1
HOW LIKELY ARE YOU TO NOD OFF OR FALL ASLEEP WHILE LYING DOWN TO REST IN THE AFTERNOON WHEN CIRCUMSTANCES PERMIT: 3

## 2019-05-09 ASSESSMENT — ENCOUNTER SYMPTOMS
EYES NEGATIVE: 1
GASTROINTESTINAL NEGATIVE: 1
ALLERGIC/IMMUNOLOGIC NEGATIVE: 1
APNEA: 1

## 2019-05-09 NOTE — PATIENT INSTRUCTIONS
Some machines have air pressure that adjusts on its own. Others have air pressures that are different when you breathe in than when you breathe out. This may reduce discomfort caused by too much pressure in your nose. Where can you learn more? Go to https://[email or web address]. org and sign in to your [facility] account. Enter A632 in the Prospectvision box to learn more about \"Learning About CPAP for Sleep Apnea. \"     If you do not have an account, please click on the \"Sign Up Now\" link. Current as of: September 5, 2018  Content Version: 12.0  © 0599-1922 Healthwise, Incorporated. Care instructions adapted under license by [facility] ([facility]). If you have questions about a medical condition or this instruction, always ask your healthcare professional. [address] any warranty or liability for your use of this information.

## 2019-05-09 NOTE — PROGRESS NOTES
MD CHA Boyd Board Certified in Sleep Medicine  Certified [facility] Sleep Medicine  Board Certified in Neurology [address]  [address] [facility] [address],  [address]  [address] [facility] ([address] [facility]  [address]           [address]  [facility] [place] [address] [phone]    Subjective:     Patient ID: Audrey Jovel is a 75 y.o. male. Chief Complaint   Patient presents with    New Patient     ref by Dr. Samuel Wild pt said that he had testing while on vacation - unable to get results ok with new testing       HPI:        Audrey Jovel is a 75 y.o. male referred by Dr Jillian Lambert for a sleep evaluation. He complains of snoring, periods of not breathing, decreased concentration, excessive daytime sleepiness, feels sleepy during the day, take naps during the day but he denies snorting, choking, knees buckling with laughing, completely or partially paralyzed while falling asleep or waking up, noisy environment, uncomfortable room temperature, uncomfortable bedding. Symptoms began several years ago, gradually worsening since that time. The patient's bed-partner confirmed the snoring and stopped breathing at night  SLEEP SCHEDULE: Goes to bed around 1 AM in theweekdays and 1 AM in the weekends. It usually takes the patient 15 minutes to fall asleep. The patient gets up 2-3 per night to go to the bathroom. The Patient finally gets up at 8 AM during the weekdays and 8 AM in the weekends. patient wakes up with dry mouth. The patient has restless sleep with frequent arousals in addition to the Patient has significant daytime sleepiness. The Patient scored Total score: 13 on Arthur Sleepiness Scale ( more than 10 is indicative of daytime sleepiness)and 33 in fatigue scale ( more than 36 is indicativeof daytime fatigue).  The patient takes 2 naps/week for 30 minutes and usually is not refreshing nap. Previous evaluation and treatment has included- HST. His BP is stable. Had home sleep study done on 10/07/2018 which showed an AHI - 51.6/hr with Low SaO2 - 70% and time below 88% of 116.3min. This is consistent with severe MED (327.23), could not use the CPAP at APAP between 15 and 20 cm with [employer] FFM.         DOT/CDL - N/A  FAA/'clau - N/A      Previous Report(s) Reviewed: historical medical records         Social History     Socioeconomic History    Marital status:      Spouse name: Not on file    Number of children: Not on file    Years of education: Not on file    Highest education level: Not on file   Occupational History    Occupation:    Social Needs    Financial resource strain: Not on file    Food insecurity:     Worry: Not on file     Inability: Not on file    Transportation needs:     Medical: Not on file     Non-medical: Not on file   Tobacco Use    Smoking status: Never Smoker    Smokeless tobacco: Never Used   Substance and Sexual Activity    Alcohol use:  Yes     Alcohol/week: 1.2 - 4.2 oz     Types: 2 - 7 Cans of beer per week    Drug use: No    Sexual activity: Not on file   Lifestyle    Physical activity:     Days per week: Not on file     Minutes per session: Not on file    Stress: Not on file   Relationships    Social connections:     Talks on phone: Not on file     Gets together: Not on file     Attends Voodoo service: Not on file     Active member of club or organization: Not on file     Attends meetings of clubs or organizations: Not on file     Relationship status: Not on file    Intimate partner violence:     Fear of current or ex partner: Not on file     Emotionally abused: Not on file     Physically abused: Not on file     Forced sexual activity: Not on file   Other Topics Concern    Not on file   Social History Narrative    Not on file       Prior to Admission medications    Medication Sig Start Date End Date Taking? Authorizing Provider   pioglitazone (ACTOS) 15 MG tablet TAKE 1 TABLET BY MOUTH DAILY 5/7/19  Yes Shivani Mckeon DO   rosuvastatin (CRESTOR) 40 MG tablet Take 1 tablet by mouth daily 4/8/19  Yes Shivani Mckeon DO   valsartan (DIOVAN) 320 MG tablet Take 1 tablet by mouth daily 4/8/19  Yes Shivani Mckeon DO   citalopram (CELEXA) 40 MG tablet Take 1 tablet by mouth daily 4/8/19  Yes Shivani Mckeon DO   rOPINIRole (REQUIP) 1 MG tablet Take 1 tablet by mouth 3 times daily 4/8/19  Yes Shivani Mckeon DO   LANTUS SOLOSTAR 100 UNIT/ML injection pen Inject 10 Units into the skin nightly 8/7/18  Yes Shivani Mckeon DO   fenofibrate (TRICOR) 145 MG tablet Take 1 tablet by mouth daily 3/2/18  Yes Shivani Mckeon DO   omeprazole (PRILOSEC) 40 MG delayed release capsule Take 40 mg by mouth daily   Yes Historical Provider, MD   calcium carbonate 600 MG TABS tablet Take 1 tablet by mouth daily   Yes Historical Provider, MD       Allergies as of 05/09/2019    (No Known Allergies)       Patient Active Problem List   Diagnosis    Degenerative cervical disc    Cervical radicular pain    Degenerative disc disease, lumbar    Low back pain radiating to left leg    Lumbar stenosis    Type 2 diabetes mellitus without complication, with long-term current use of insulin ([facility])    Essential hypertension    Hyperlipidemia LDL goal <70    Appendiceal tumor    Grief    Gastroesophageal reflux disease without esophagitis    Restless leg    Reactive depression    Gout without tophus    High risk medication use    Disability examination    Non compliance w medication regimen    MED (obstructive sleep apnea)       Past Medical History:   Diagnosis Date    Appendiceal tumor     Asthma     Cervical spinal stenosis     Degenerative disc disease, lumbar     history of herniated disc at L4-5 with L5 radiculopathy.  pt had L4-5 discectomy with  [facility]    Diabetes mellitus ([facility] [address]     Esophageal reflux     Gout     Gross hematuria     Hand cramp     Hemorrhoid     Hepatitis     High blood pressure     Hyperlipidemia     Lumbar herniated disc     Major depressive disorder, recurrent episode, mild (HCC)     Postnasal drip     Restless leg syndrome     Seasonal allergies     Sleep apnea     Unilateral hearing loss        Past Surgical History:   Procedure Laterality Date    APPENDECTOMY  May 5, 2008    [facility]    [facility]ECK SURGERY      [facility]       Family History   Problem Relation Age of Onset    Cancer Mother     Cancer Father     Heart Disease Father     High Blood Pressure Father     Diabetes Sister        Review of Systems   HENT: Positive for congestion and hearing loss. Eyes: Negative. Respiratory: Positive for apnea. Cardiovascular: Negative. Negative for leg swelling. Gastrointestinal: Negative. Endocrine: Negative. Genitourinary: Positive for frequency. Musculoskeletal: Positive for arthralgias and myalgias. Skin: Negative. Allergic/Immunologic: Negative. Neurological: Negative. Psychiatric/Behavioral: Positive for agitation, decreased concentration and dysphoric mood. The patient is nervous/anxious. Objective:     Vitals:  Weight BMI Neck circumference    Wt Readings from Last 3 Encounters:   05/09/19 197 lb 12.8 oz (89.7 kg)   04/08/19 194 lb 6.4 oz (88.2 kg)   12/07/18 196 lb (88.9 kg)    Body mass index is 33.95 kg/m².  Neck circumference: 18.5     BP HR SaO2   BP Readings from Last 3 Encounters:   05/09/19 124/82   04/08/19 116/74   12/07/18 138/76    Pulse Readings from Last 3 Encounters:   05/09/19 71   04/08/19 72   12/07/18 78    SpO2 Readings from Last 3 Encounters:   05/09/19 97%   04/08/19 98%        The mandibular molar Class :   [x]1 []2 []3      Mallampati I Airway Classification:   []1 []2 []3 [x]4        Physical Exam   Constitutional: No distress. HENT:   Mallampati class 4, no retrognathia or hypognathia , normal airflow in bilateral nostrils, no septum deviation , crowded oropharynx with low soft palate, high arched hard palate,no tonsils enlargement. Eyes: EOM are normal.   Neck: Neck supple. Cardiovascular: Normal rate, regular rhythm and normal heart sounds. Pulmonary/Chest: Effort normal and breath sounds normal. No respiratory distress. Musculoskeletal: Normal range of motion. He exhibits no edema. Neurological: He is alert. Skin: Skin is warm. Psychiatric: He has a normal mood and affect. Nursing note and vitals reviewed. Assessment:   Obstructive Sleep Apnea/Hypopnea Syndrome  Could not tolerate the auto set pressure. Diagnosis Orders   1. MED (obstructive sleep apnea)  Sleep Study with PAP Titration   2. Essential hypertension       Plan: Will do in lab titration with mask fitting. Patient was counseled about the pathophysiology of obstructive sleep apnea syndrome and the methods for evaluating its presence and severity. Patient was counseled to avoid driving and other potentially hazardous circumstances if the patient is experiencing excessive sleepiness. Treatment considerations include the use of nasal CPAP, oral dental appliance or a surgical intervention, which should be based on otolarygologic findings, In the meantime, the patient should be cautioned to avoid the use of alcohol or other depressant medications because of potential for increasing the duration and severity of apnea and cautioned regarding driving or operating and dangerous equipment if the patient is experiencing daytime sleepiness. .        Orders Placed This Encounter   Procedures    Sleep Study with PAP Titration       Return in about 2 months (around 7/9/2019) for Reveiwing CPAP usage and compliance report and tro.     Tahmina Diaz MD  Medical Director - [facility]

## 2019-06-12 ENCOUNTER — HOSPITAL ENCOUNTER (OUTPATIENT)
Dept: SLEEP CENTER | Age: 64
Discharge: HOME | End: 2019-06-12
Payer: MEDICARE

## 2019-06-12 DIAGNOSIS — G47.33 OSA (OBSTRUCTIVE SLEEP APNEA): ICD-10-CM

## 2019-06-12 PROCEDURE — 95811 POLYSOM 6/>YRS CPAP 4/> PARM: CPT

## 2019-06-13 PROCEDURE — 95811 POLYSOM 6/>YRS CPAP 4/> PARM: CPT | Performed by: PSYCHIATRY & NEUROLOGY

## 2019-06-17 ENCOUNTER — TELEPHONE (OUTPATIENT)
Dept: SLEEP MEDICINE | Age: 64
End: 2019-06-17

## 2019-06-17 NOTE — TELEPHONE ENCOUNTER
Left vm for pt to call us back regarding cpap results. PSG: severe chetan with AHI of 51.6/ hour oxygen desaturation to 70%. cpap pressure of 10 was utilized. Patient prefers [employer] on form. Confirm when pt calls back.

## 2019-06-20 NOTE — TELEPHONE ENCOUNTER
Received a message back from [facility]   Drug is covered by current benefit plan. No further PA activity needed.

## 2019-06-21 NOTE — TELEPHONE ENCOUNTER
Patient verbalized understanding of results he said that Krishna Neighbors has already called him about the cpap .

## 2019-08-04 DIAGNOSIS — Z79.4 TYPE 2 DIABETES MELLITUS WITHOUT COMPLICATION, WITH LONG-TERM CURRENT USE OF INSULIN (HCC): ICD-10-CM

## 2019-08-04 DIAGNOSIS — E11.9 TYPE 2 DIABETES MELLITUS WITHOUT COMPLICATION, WITH LONG-TERM CURRENT USE OF INSULIN (HCC): ICD-10-CM

## 2019-08-05 RX ORDER — PIOGLITAZONEHYDROCHLORIDE 15 MG/1
15 TABLET ORAL DAILY
Qty: 30 TABLET | Refills: 0 | Status: SHIPPED | OUTPATIENT
Start: 2019-08-05 | End: 2019-09-03 | Stop reason: SDUPTHER

## 2019-08-16 ENCOUNTER — TELEPHONE (OUTPATIENT)
Dept: INTERNAL MEDICINE CLINIC | Age: 64
End: 2019-08-16

## 2019-08-16 RX ORDER — OMEPRAZOLE 40 MG/1
40 CAPSULE, DELAYED RELEASE ORAL DAILY
Qty: 90 CAPSULE | Refills: 3 | Status: SHIPPED | OUTPATIENT
Start: 2019-08-16 | End: 2020-02-24 | Stop reason: SDUPTHER

## 2019-09-03 DIAGNOSIS — E11.9 TYPE 2 DIABETES MELLITUS WITHOUT COMPLICATION, WITH LONG-TERM CURRENT USE OF INSULIN (HCC): ICD-10-CM

## 2019-09-03 DIAGNOSIS — Z79.4 TYPE 2 DIABETES MELLITUS WITHOUT COMPLICATION, WITH LONG-TERM CURRENT USE OF INSULIN (HCC): ICD-10-CM

## 2019-09-03 RX ORDER — PIOGLITAZONEHYDROCHLORIDE 15 MG/1
15 TABLET ORAL DAILY
Qty: 30 TABLET | Refills: 0 | Status: SHIPPED | OUTPATIENT
Start: 2019-09-03 | End: 2019-10-03 | Stop reason: SDUPTHER

## 2019-10-03 DIAGNOSIS — Z79.4 TYPE 2 DIABETES MELLITUS WITHOUT COMPLICATION, WITH LONG-TERM CURRENT USE OF INSULIN (HCC): ICD-10-CM

## 2019-10-03 DIAGNOSIS — E11.9 TYPE 2 DIABETES MELLITUS WITHOUT COMPLICATION, WITH LONG-TERM CURRENT USE OF INSULIN (HCC): ICD-10-CM

## 2019-10-03 RX ORDER — PIOGLITAZONEHYDROCHLORIDE 15 MG/1
15 TABLET ORAL DAILY
Qty: 30 TABLET | Refills: 0 | Status: SHIPPED | OUTPATIENT
Start: 2019-10-03 | End: 2019-11-02 | Stop reason: SDUPTHER

## 2019-10-22 DIAGNOSIS — Z79.4 TYPE 2 DIABETES MELLITUS WITHOUT COMPLICATION, WITH LONG-TERM CURRENT USE OF INSULIN (HCC): ICD-10-CM

## 2019-10-22 DIAGNOSIS — E11.9 TYPE 2 DIABETES MELLITUS WITHOUT COMPLICATION, WITH LONG-TERM CURRENT USE OF INSULIN (HCC): ICD-10-CM

## 2019-10-22 RX ORDER — INSULIN GLARGINE 100 [IU]/ML
10 INJECTION, SOLUTION SUBCUTANEOUS NIGHTLY
Qty: 15 PEN | Refills: 1 | Status: SHIPPED | OUTPATIENT
Start: 2019-10-22 | End: 2019-12-10 | Stop reason: SDUPTHER

## 2019-11-02 DIAGNOSIS — E11.9 TYPE 2 DIABETES MELLITUS WITHOUT COMPLICATION, WITH LONG-TERM CURRENT USE OF INSULIN (HCC): ICD-10-CM

## 2019-11-02 DIAGNOSIS — Z79.4 TYPE 2 DIABETES MELLITUS WITHOUT COMPLICATION, WITH LONG-TERM CURRENT USE OF INSULIN (HCC): ICD-10-CM

## 2019-11-04 RX ORDER — PIOGLITAZONEHYDROCHLORIDE 15 MG/1
15 TABLET ORAL DAILY
Qty: 30 TABLET | Refills: 0 | Status: SHIPPED | OUTPATIENT
Start: 2019-11-04 | End: 2019-12-02 | Stop reason: SDUPTHER

## 2019-11-05 ENCOUNTER — OFFICE VISIT (OUTPATIENT)
Dept: INTERNAL MEDICINE CLINIC | Age: 64
End: 2019-11-05
Payer: MEDICARE

## 2019-11-05 VITALS
HEART RATE: 68 BPM | DIASTOLIC BLOOD PRESSURE: 84 MMHG | BODY MASS INDEX: 34.6 KG/M2 | TEMPERATURE: 98 F | SYSTOLIC BLOOD PRESSURE: 136 MMHG | WEIGHT: 201.6 LBS | OXYGEN SATURATION: 99 %

## 2019-11-05 DIAGNOSIS — Z12.5 PROSTATE CANCER SCREENING: ICD-10-CM

## 2019-11-05 DIAGNOSIS — E11.9 TYPE 2 DIABETES MELLITUS WITHOUT COMPLICATION, WITH LONG-TERM CURRENT USE OF INSULIN (HCC): Primary | ICD-10-CM

## 2019-11-05 DIAGNOSIS — E55.9 VITAMIN D INSUFFICIENCY: ICD-10-CM

## 2019-11-05 DIAGNOSIS — E78.5 HYPERLIPIDEMIA LDL GOAL <70: ICD-10-CM

## 2019-11-05 DIAGNOSIS — F32.9 REACTIVE DEPRESSION: ICD-10-CM

## 2019-11-05 DIAGNOSIS — J06.9 VIRAL UPPER RESPIRATORY TRACT INFECTION: ICD-10-CM

## 2019-11-05 DIAGNOSIS — Z91.14 NON COMPLIANCE W MEDICATION REGIMEN: ICD-10-CM

## 2019-11-05 DIAGNOSIS — Z79.4 TYPE 2 DIABETES MELLITUS WITHOUT COMPLICATION, WITH LONG-TERM CURRENT USE OF INSULIN (HCC): Primary | ICD-10-CM

## 2019-11-05 DIAGNOSIS — I10 ESSENTIAL HYPERTENSION: ICD-10-CM

## 2019-11-05 PROCEDURE — G8484 FLU IMMUNIZE NO ADMIN: HCPCS | Performed by: INTERNAL MEDICINE

## 2019-11-05 PROCEDURE — G8427 DOCREV CUR MEDS BY ELIG CLIN: HCPCS | Performed by: INTERNAL MEDICINE

## 2019-11-05 PROCEDURE — 83036 HEMOGLOBIN GLYCOSYLATED A1C: CPT | Performed by: INTERNAL MEDICINE

## 2019-11-05 PROCEDURE — 1036F TOBACCO NON-USER: CPT | Performed by: INTERNAL MEDICINE

## 2019-11-05 PROCEDURE — 1123F ACP DISCUSS/DSCN MKR DOCD: CPT | Performed by: INTERNAL MEDICINE

## 2019-11-05 PROCEDURE — G8417 CALC BMI ABV UP PARAM F/U: HCPCS | Performed by: INTERNAL MEDICINE

## 2019-11-05 PROCEDURE — 4040F PNEUMOC VAC/ADMIN/RCVD: CPT | Performed by: INTERNAL MEDICINE

## 2019-11-05 PROCEDURE — 3046F HEMOGLOBIN A1C LEVEL >9.0%: CPT | Performed by: INTERNAL MEDICINE

## 2019-11-05 PROCEDURE — 99214 OFFICE O/P EST MOD 30 MIN: CPT | Performed by: INTERNAL MEDICINE

## 2019-11-05 PROCEDURE — 3017F COLORECTAL CA SCREEN DOC REV: CPT | Performed by: INTERNAL MEDICINE

## 2019-11-05 PROCEDURE — 2022F DILAT RTA XM EVC RTNOPTHY: CPT | Performed by: INTERNAL MEDICINE

## 2019-11-05 RX ORDER — GLIPIZIDE 10 MG/1
10 TABLET, FILM COATED, EXTENDED RELEASE ORAL
Qty: 60 TABLET | Refills: 5 | Status: SHIPPED | OUTPATIENT
Start: 2019-11-05 | End: 2019-11-05 | Stop reason: SDUPTHER

## 2019-11-05 RX ORDER — GLIPIZIDE 10 MG/1
TABLET, FILM COATED, EXTENDED RELEASE ORAL
Qty: 180 TABLET | Refills: 5 | Status: SHIPPED | OUTPATIENT
Start: 2019-11-05 | End: 2020-02-24 | Stop reason: SDUPTHER

## 2019-12-02 DIAGNOSIS — E11.9 TYPE 2 DIABETES MELLITUS WITHOUT COMPLICATION, WITH LONG-TERM CURRENT USE OF INSULIN (HCC): ICD-10-CM

## 2019-12-02 DIAGNOSIS — Z79.4 TYPE 2 DIABETES MELLITUS WITHOUT COMPLICATION, WITH LONG-TERM CURRENT USE OF INSULIN (HCC): ICD-10-CM

## 2019-12-02 RX ORDER — PIOGLITAZONEHYDROCHLORIDE 15 MG/1
15 TABLET ORAL DAILY
Qty: 30 TABLET | Refills: 0 | Status: SHIPPED | OUTPATIENT
Start: 2019-12-02 | End: 2020-01-02

## 2019-12-10 DIAGNOSIS — Z79.4 TYPE 2 DIABETES MELLITUS WITHOUT COMPLICATION, WITH LONG-TERM CURRENT USE OF INSULIN (HCC): ICD-10-CM

## 2019-12-10 DIAGNOSIS — E11.9 TYPE 2 DIABETES MELLITUS WITHOUT COMPLICATION, WITH LONG-TERM CURRENT USE OF INSULIN (HCC): ICD-10-CM

## 2019-12-10 RX ORDER — INSULIN GLARGINE 100 [IU]/ML
INJECTION, SOLUTION SUBCUTANEOUS
Qty: 15 ML | Refills: 0 | Status: SHIPPED | OUTPATIENT
Start: 2019-12-10 | End: 2020-01-06

## 2020-01-02 RX ORDER — PIOGLITAZONEHYDROCHLORIDE 15 MG/1
15 TABLET ORAL DAILY
Qty: 30 TABLET | Refills: 0 | Status: SHIPPED | OUTPATIENT
Start: 2020-01-02 | End: 2020-01-31

## 2020-01-06 RX ORDER — INSULIN GLARGINE 100 [IU]/ML
INJECTION, SOLUTION SUBCUTANEOUS
Qty: 15 ML | Refills: 0 | Status: SHIPPED | OUTPATIENT
Start: 2020-01-06 | End: 2020-02-24 | Stop reason: SDUPTHER

## 2020-01-15 ENCOUNTER — TELEPHONE (OUTPATIENT)
Dept: INTERNAL MEDICINE CLINIC | Age: 65
End: 2020-01-15

## 2020-01-15 RX ORDER — GLUCOSAMINE HCL/CHONDROITIN SU 500-400 MG
CAPSULE ORAL
Qty: 100 STRIP | Refills: 0 | Status: SHIPPED | OUTPATIENT
Start: 2020-01-15

## 2020-01-31 RX ORDER — PIOGLITAZONEHYDROCHLORIDE 15 MG/1
15 TABLET ORAL DAILY
Qty: 30 TABLET | Refills: 0 | Status: SHIPPED | OUTPATIENT
Start: 2020-01-31 | End: 2020-02-24 | Stop reason: SDUPTHER

## 2020-02-24 ENCOUNTER — OFFICE VISIT (OUTPATIENT)
Dept: INTERNAL MEDICINE CLINIC | Age: 65
End: 2020-02-24
Payer: COMMERCIAL

## 2020-02-24 VITALS
WEIGHT: 205.8 LBS | DIASTOLIC BLOOD PRESSURE: 70 MMHG | OXYGEN SATURATION: 99 % | HEART RATE: 64 BPM | SYSTOLIC BLOOD PRESSURE: 118 MMHG | BODY MASS INDEX: 35.33 KG/M2

## 2020-02-24 DIAGNOSIS — D64.9 MILD ANEMIA: ICD-10-CM

## 2020-02-24 LAB
FERRITIN: 384.1 NG/ML (ref 30–400)
IRON SATURATION: 32 % (ref 20–50)
IRON: 108 UG/DL (ref 59–158)
TOTAL IRON BINDING CAPACITY: 334 UG/DL (ref 260–445)

## 2020-02-24 PROCEDURE — 99214 OFFICE O/P EST MOD 30 MIN: CPT | Performed by: INTERNAL MEDICINE

## 2020-02-24 RX ORDER — CITALOPRAM 40 MG/1
40 TABLET ORAL DAILY
Qty: 90 TABLET | Refills: 3 | Status: SHIPPED | OUTPATIENT
Start: 2020-02-24 | End: 2020-03-31

## 2020-02-24 RX ORDER — ROSUVASTATIN CALCIUM 40 MG/1
40 TABLET, COATED ORAL DAILY
Qty: 90 TABLET | Refills: 3 | Status: SHIPPED | OUTPATIENT
Start: 2020-02-24 | End: 2020-03-31

## 2020-02-24 RX ORDER — ROPINIROLE 1 MG/1
1 TABLET, FILM COATED ORAL 3 TIMES DAILY
Qty: 270 TABLET | Refills: 3 | Status: SHIPPED | OUTPATIENT
Start: 2020-02-24 | End: 2020-03-31

## 2020-02-24 RX ORDER — INSULIN GLARGINE 100 [IU]/ML
60 INJECTION, SOLUTION SUBCUTANEOUS NIGHTLY
Qty: 30 PEN | Refills: 3 | Status: SHIPPED | OUTPATIENT
Start: 2020-02-24 | End: 2020-04-03

## 2020-02-24 RX ORDER — VALSARTAN 320 MG/1
320 TABLET ORAL DAILY
Qty: 90 TABLET | Refills: 3 | Status: SHIPPED | OUTPATIENT
Start: 2020-02-24 | End: 2020-03-18 | Stop reason: SDUPTHER

## 2020-02-24 RX ORDER — PIOGLITAZONEHYDROCHLORIDE 15 MG/1
15 TABLET ORAL DAILY
Qty: 30 TABLET | Refills: 0 | Status: SHIPPED | OUTPATIENT
Start: 2020-02-24 | End: 2020-03-02

## 2020-02-24 RX ORDER — OMEPRAZOLE 40 MG/1
40 CAPSULE, DELAYED RELEASE ORAL DAILY
Qty: 90 CAPSULE | Refills: 3 | Status: SHIPPED | OUTPATIENT
Start: 2020-02-24 | End: 2020-02-24 | Stop reason: SDUPTHER

## 2020-02-24 RX ORDER — GLIPIZIDE 10 MG/1
10 TABLET, FILM COATED, EXTENDED RELEASE ORAL 2 TIMES DAILY WITH MEALS
Qty: 180 TABLET | Refills: 3 | Status: SHIPPED | OUTPATIENT
Start: 2020-02-24 | End: 2020-09-25 | Stop reason: SINTOL

## 2020-02-24 RX ORDER — OMEPRAZOLE 40 MG/1
40 CAPSULE, DELAYED RELEASE ORAL DAILY
Qty: 90 CAPSULE | Refills: 3 | Status: SHIPPED | OUTPATIENT
Start: 2020-02-24 | End: 2021-01-18 | Stop reason: SDUPTHER

## 2020-02-24 ASSESSMENT — ENCOUNTER SYMPTOMS
CHEST TIGHTNESS: 0
DIARRHEA: 0
SHORTNESS OF BREATH: 0
ABDOMINAL PAIN: 0
CONSTIPATION: 0

## 2020-02-24 NOTE — ASSESSMENT & PLAN NOTE
Patient was found to have appendiceal carcinoma incidentally on colonoscopy in 2008. He underwent resection with Dr. Deborah Gannon in 2008. Is due for follow-up colonoscopy this year as his most recent one was in October 2015.

## 2020-02-24 NOTE — PATIENT INSTRUCTIONS
If your morning sugars start to drop below 100, please back off on your Lantus by 2 units every 2-3 days until your sugars are running around 120 in the mornings.

## 2020-02-24 NOTE — PROGRESS NOTES
118   Comment:  <130    Desirable  130-159 Above Desirable  160-189 Borderline High  190-219 High  >= 220  Very High <=129 mg/dL   Fasting Specimen? Yes None   Specimen Collected on         No Known Allergies   Past Medical History:   Diagnosis Date    Appendiceal tumor     Asthma     Cervical spinal stenosis     Degenerative disc disease, lumbar     history of herniated disc at L4-5 with L5 radiculopathy. pt had L4-5 discectomy with Dr Max Guallpa    Diabetes mellitus ([facility] [address]     Esophageal reflux     Gout     Gross hematuria     Hand cramp     Hemorrhoid     Hepatitis     High blood pressure     Hyperlipidemia     Lumbar herniated disc     Major depressive disorder, recurrent episode, mild (HCC)     Postnasal drip     Restless leg syndrome     Seasonal allergies     Sleep apnea     Unilateral hearing loss       Past Surgical History:   Procedure Laterality Date    APPENDECTOMY  May 5, 2008    BACK SURGERY      [facility]    NECK SURGERY      [facility]      Social History     Socioeconomic History    Marital status:      Spouse name: Not on file    Number of children: Not on file    Years of education: Not on file    Highest education level: Not on file   Occupational History    Occupation:    Social Needs    Financial resource strain: Not on file    Food insecurity:     Worry: Not on file     Inability: Not on file    Transportation needs:     Medical: Not on file     Non-medical: Not on file   Tobacco Use    Smoking status: Never Smoker    Smokeless tobacco: Never Used   Substance and Sexual Activity    Alcohol use:  Yes     Alcohol/week: 2.0 - 7.0 standard drinks     Types: 2 - 7 Cans of beer per week    Drug use: No    Sexual activity: Not on file   Lifestyle    Physical activity:     Days per week: Not on file     Minutes per session: Not on file    Stress: Not on file   Relationships    Social connections:     Talks on phone: Not on file     Gets together: Not on file     Attends Yazidism service: Not on file     Active member of club or organization: Not on file     Attends meetings of clubs or organizations: Not on file     Relationship status: Not on file    Intimate partner violence:     Fear of current or ex partner: Not on file     Emotionally abused: Not on file     Physically abused: Not on file     Forced sexual activity: Not on file   Other Topics Concern    Not on file   Social History Narrative    Not on file     Family History   Problem Relation Age of Onset    Cancer Mother     Cancer Father     Heart Disease Father     High Blood Pressure Father     Diabetes Sister         Outpatient Medications Prior to Visit   Medication Sig Dispense Refill    blood glucose monitor strips Test 2 times a day & as needed for symptoms of irregular blood glucose. Test strips covered by patients insurance. DX e11.9 100 strip 0    calcium carbonate 600 MG TABS tablet Take 1 tablet by mouth daily      pioglitazone (ACTOS) 15 MG tablet TAKE 1 TABLET BY MOUTH DAILY 30 tablet 0    LANTUS SOLOSTAR 100 UNIT/ML injection pen INJECT 60 UNITS AT BEDTIME 15 mL 0    glipiZIDE (GLUCOTROL XL) 10 MG extended release tablet TAKE 1 TABLET BY MOUTH TWICE DAILY BEFORE MEALS 180 tablet 5    metFORMIN (GLUCOPHAGE) 1000 MG tablet TAKE 1 TABLET BY MOUTH TWICE DAILY WITH MEALS 180 tablet 5    omeprazole (PRILOSEC) 40 MG delayed release capsule Take 1 capsule by mouth daily 90 capsule 3    rosuvastatin (CRESTOR) 40 MG tablet Take 1 tablet by mouth daily 90 tablet 3    valsartan (DIOVAN) 320 MG tablet Take 1 tablet by mouth daily 90 tablet 3    citalopram (CELEXA) 40 MG tablet Take 1 tablet by mouth daily 90 tablet 3    rOPINIRole (REQUIP) 1 MG tablet Take 1 tablet by mouth 3 times daily 270 tablet 3     No facility-administered medications prior to visit.         Patient'spast medical history, surgical history, family history, medications,  and allergies  were all reviewed and updated as appropriate today. Review of Systems   Constitutional: Negative for appetite change, fatigue, fever and unexpected weight change. Eyes: Negative for visual disturbance. Respiratory: Negative for chest tightness and shortness of breath. Cardiovascular: Negative for chest pain. Gastrointestinal: Negative for abdominal pain, constipation and diarrhea. Endocrine: Negative for cold intolerance, heat intolerance, polydipsia, polyphagia and polyuria. No low blood sugars   Skin: Negative for rash. /70   Pulse 64   Wt 205 lb 12.8 oz (93.4 kg)   SpO2 99%   BMI 35.33 kg/m²   Physical Exam  Vitals signs and nursing note reviewed. Constitutional:       Appearance: He is well-developed. He is not toxic-appearing. HENT:      Head: Normocephalic. Right Ear: Tympanic membrane, ear canal and external ear normal.      Left Ear: Tympanic membrane, ear canal and external ear normal.      Nose: Nose normal.      Mouth/Throat:      Mouth: Mucous membranes are moist.      Pharynx: No oropharyngeal exudate or posterior oropharyngeal erythema. Neck:      Thyroid: No thyroid mass or thyromegaly. Vascular: No carotid bruit. Cardiovascular:      Rate and Rhythm: Normal rate and regular rhythm. Pulses:           Dorsalis pedis pulses are 2+ on the right side and 2+ on the left side. Heart sounds: Normal heart sounds. No murmur. Comments: No LE edema  Pulmonary:      Effort: Pulmonary effort is normal.      Breath sounds: Normal breath sounds. Abdominal:      Palpations: Abdomen is soft. Tenderness: There is no abdominal tenderness. Lymphadenopathy:      Cervical: No cervical adenopathy. Neurological:      Mental Status: He is alert. Psychiatric:         Mood and Affect: Mood normal.         Behavior: Behavior normal. Behavior is cooperative.          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 He underwent resection with Dr. Sariah Guillaume in 2008. Is due for follow-up colonoscopy this year as his most recent one was in October 2015. Colon cancer screening     Due for colonoscopy this year. Order given. Relevant Orders    External Referral To General Surgery    Essential hypertension - Primary     Well-controlled on valsartan 320 mg daily. Relevant Orders    CBC Auto Differential    Hyperlipidemia LDL goal <70     Significantly improved. Continue Crestor 40 mg daily. Relevant Medications    rosuvastatin (CRESTOR) 40 MG tablet    valsartan (DIOVAN) 320 MG tablet    Mild anemia     Check iron studies. Refer to Dr. Michel Ewing for follow-up colonoscopy. Relevant Orders    External Referral To General Surgery    Iron and TIBC    Ferritin    Non compliance w medication regimen     Patient is wondering about pill packs. He would likely be more compliant with his medications provided to him in pill packs. All prescriptions were printed for him to take to [address] [place] [address] in [facility] and start getting pill packs. He also has improved compliance recently as his depression has improved as well. MED (obstructive sleep apnea)     Has been compliant with CPAP and is feeling better. Restless leg     Continue Requip1 mg 3 times daily. Relevant Medications    rOPINIRole (REQUIP) 1 MG tablet    Type 2 diabetes mellitus without complication, with long-term current use of insulin ([facility] [address]     Patient really struggles with compliance with his diabetes. In recent weeks, he has been more compliant and is getting fasting blood sugars in the low 100s. Continue Lantus 60 units nightly with metformin 1000 mg twice daily, glipizide XL 10 mg twice daily with meals, and Actos 15 mg daily.   He is looking into pill packs with his pharmacy to improve compliance with treatment. I have encouraged him to follow through with this. Most recent hemoglobin A1c was 11% which is down from 13.1% at his last appointment in November 2019. Relevant Medications    glipiZIDE (GLUCOTROL XL) 10 MG extended release tablet    metFORMIN (GLUCOPHAGE) 1000 MG tablet    pioglitazone (ACTOS) 15 MG tablet    LANTUS SOLOSTAR 100 UNIT/ML injection pen    Other Relevant Orders    Comprehensive Metabolic Panel    Hemoglobin A1C      Other Visit Diagnoses     Prostate cancer screening        Relevant Orders    Psa screening          Current Outpatient Medications   Medication Sig Dispense Refill    citalopram (CELEXA) 40 MG tablet Take 1 tablet by mouth daily 90 tablet 3    glipiZIDE (GLUCOTROL XL) 10 MG extended release tablet Take 1 tablet by mouth 2 times daily (with meals) 180 tablet 3    metFORMIN (GLUCOPHAGE) 1000 MG tablet Take 1 tablet by mouth 2 times daily (with meals) 180 tablet 3    omeprazole (PRILOSEC) 40 MG delayed release capsule Take 1 capsule by mouth daily 90 capsule 3    pioglitazone (ACTOS) 15 MG tablet Take 1 tablet by mouth daily 30 tablet 0    LANTUS SOLOSTAR 100 UNIT/ML injection pen Inject 60 Units into the skin nightly 30 pen 3    rOPINIRole (REQUIP) 1 MG tablet Take 1 tablet by mouth 3 times daily 270 tablet 3    rosuvastatin (CRESTOR) 40 MG tablet Take 1 tablet by mouth daily 90 tablet 3    valsartan (DIOVAN) 320 MG tablet Take 1 tablet by mouth daily 90 tablet 3    Insulin Pen Needle 32G X 4 MM MISC 1 each by Does not apply route daily 100 each 3    blood glucose monitor strips Test 2 times a day & as needed for symptoms of irregular blood glucose. Test strips covered by patients insurance. DX e11.9 100 strip 0    calcium carbonate 600 MG TABS tablet Take 1 tablet by mouth daily       No current facility-administered medications for this visit. Return in about 3 months (around 5/24/2020) for labs prior.

## 2020-02-24 NOTE — ASSESSMENT & PLAN NOTE
Patient really struggles with compliance with his diabetes. In recent weeks, he has been more compliant and is getting fasting blood sugars in the low 100s. Continue Lantus 60 units nightly with metformin 1000 mg twice daily, glipizide XL 10 mg twice daily with meals, and Actos 15 mg daily. He is looking into pill packs with his pharmacy to improve compliance with treatment. I have encouraged him to follow through with this. Most recent hemoglobin A1c was 11% which is down from 13.1% at his last appointment in November 2019.

## 2020-02-24 NOTE — ASSESSMENT & PLAN NOTE
Patient is wondering about pill packs. He would likely be more compliant with his medications provided to him in pill packs. All prescriptions were printed for him to take to [address] [place] [address] in [facility] and start getting pill packs. He also has improved compliance recently as his depression has improved as well.

## 2020-03-02 RX ORDER — PIOGLITAZONEHYDROCHLORIDE 15 MG/1
15 TABLET ORAL DAILY
Qty: 30 TABLET | Refills: 0 | Status: SHIPPED | OUTPATIENT
Start: 2020-03-02 | End: 2020-03-31

## 2020-03-18 RX ORDER — VALSARTAN 320 MG/1
320 TABLET ORAL DAILY
Qty: 90 TABLET | Refills: 3 | Status: SHIPPED | OUTPATIENT
Start: 2020-03-18 | End: 2021-01-18 | Stop reason: SDUPTHER

## 2020-03-25 PROBLEM — Z12.11 COLON CANCER SCREENING: Status: RESOLVED | Noted: 2018-02-28 | Resolved: 2020-03-25

## 2020-04-03 RX ORDER — INSULIN GLARGINE 100 [IU]/ML
INJECTION, SOLUTION SUBCUTANEOUS
Qty: 15 ML | Refills: 5 | Status: SHIPPED | OUTPATIENT
Start: 2020-04-03 | End: 2021-01-18 | Stop reason: ALTCHOICE

## 2020-04-13 ENCOUNTER — TELEPHONE (OUTPATIENT)
Dept: INTERNAL MEDICINE CLINIC | Age: 65
End: 2020-04-13

## 2020-04-15 ENCOUNTER — VIRTUAL VISIT (OUTPATIENT)
Dept: INTERNAL MEDICINE CLINIC | Age: 65
End: 2020-04-15
Payer: COMMERCIAL

## 2020-04-15 VITALS — DIASTOLIC BLOOD PRESSURE: 84 MMHG | SYSTOLIC BLOOD PRESSURE: 128 MMHG | TEMPERATURE: 98.4 F

## 2020-04-15 PROBLEM — J98.01 POST-INFECTION BRONCHOSPASM: Status: ACTIVE | Noted: 2020-04-15

## 2020-04-15 PROCEDURE — 99213 OFFICE O/P EST LOW 20 MIN: CPT | Performed by: INTERNAL MEDICINE

## 2020-04-15 RX ORDER — GUAIFENESIN AND CODEINE PHOSPHATE 100; 10 MG/5ML; MG/5ML
5 SOLUTION ORAL 2 TIMES DAILY PRN
Qty: 118 ML | Refills: 0 | Status: SHIPPED | OUTPATIENT
Start: 2020-04-15 | End: 2020-04-25

## 2020-04-15 RX ORDER — ALBUTEROL SULFATE 90 UG/1
2 AEROSOL, METERED RESPIRATORY (INHALATION) 4 TIMES DAILY PRN
Qty: 1 INHALER | Refills: 0 | Status: SHIPPED | OUTPATIENT
Start: 2020-04-15 | End: 2020-05-05

## 2020-04-15 RX ORDER — BENZONATATE 200 MG/1
200 CAPSULE ORAL 3 TIMES DAILY PRN
Qty: 30 CAPSULE | Refills: 0 | Status: SHIPPED | OUTPATIENT
Start: 2020-04-15 | End: 2020-04-25

## 2020-04-15 NOTE — ASSESSMENT & PLAN NOTE
Treat symptomatically with albuterol, Tessalon, and Robitussin with codeine for bedtime. He does have confirmed exposure to COVID. His symptoms are mild and have been going on for more than 2 weeks. At this time, I do recommend antibody testing once available.

## 2020-04-15 NOTE — PROGRESS NOTES
TELEHEALTH EVALUATION -- Audio/Visual (During DWLXY-93 public health emergency)    HPI    Seen today via video visit. C/o persistent cough x 3 weeks. Cough is interfering with sleep in that he cannot wear his CPAP. Denies SOB/wheezing/chest tightness. Nephew and wife were visiting a few weeks ago and a few days after they visited, they both tested positive for COVID. Since then, has been self-quarantining and monitoring temperatures. Has had a persistent cough productive of clear mucous. Cough is so severe at time that it will gag him and cause post-tussive emesis. Has tried cough medicine and cold and flu medicine that will help with symptoms. No diarrhea. No loss of sense of taste or smell. No sinus tenderness. No body aches. Has had nasal congestion. Has a swollen gland in his neck. Has had low-grade temp of around 99.1 that lasted about 4 hours. Temp has been running around 98.5 F on ear thermometer. Did sleep a lot initially but is feeling better now. BS was 215 this am, running around this since he has been ill. Had been running 140's-160's prior. Past Medical History:   Diagnosis Date    Appendiceal tumor     Asthma     Cervical spinal stenosis     Degenerative disc disease, lumbar     history of herniated disc at L4-5 with L5 radiculopathy.  pt had L4-5 discectomy with Dr Anjana Bustos    Diabetes mellitus ([address]     Esophageal reflux     Gout     Gross hematuria     Hand cramp     Hemorrhoid     Hepatitis     High blood pressure     Hyperlipidemia     Lumbar herniated disc     Major depressive disorder, recurrent episode, mild (HCC)     Postnasal drip     Restless leg syndrome     Seasonal allergies     Sleep apnea     Unilateral hearing loss        Past Surgical History:   Procedure Laterality Date    APPENDECTOMY  May 5, 2008    BACK SUR[facility]ERY      [facility]    NECK SURGERY      [facility]       Family History   Problem Relation Age of Onset    Cancer Mother     Cancer Father     Heart Disease Father     High Blood Pressure Father     Diabetes Sister        No Known Allergies    Current Outpatient Medications   Medication Sig Dispense Refill    benzonatate (TESSALON) 200 MG capsule Take 1 capsule by mouth 3 times daily as needed for Cough 30 capsule 0    guaiFENesin-codeine (TUSSI-ORGANIDIN NR) 100-10 MG/5ML syrup Take 5 mLs by mouth 2 times daily as needed for Cough for up to 10 days. 118 mL 0    albuterol sulfate HFA (VENTOLIN HFA) 108 (90 Base) MCG/ACT inhaler Inhale 2 puffs into the lungs 4 times daily as needed for Wheezing 1 Inhaler 0    LANTUS SOLOSTAR 100 UNIT/ML injection pen ADMINISTER 60 UNITS UNDER THE SKIN AT BEDTIME 15 mL 5    rosuvastatin (CRESTOR) 40 MG tablet TAKE 1 TABLET BY MOUTH DAILY 90 tablet 3    rOPINIRole (REQUIP) 1 MG tablet TAKE 1 TABLET BY MOUTH THREE TIMES DAILY 270 tablet 3    citalopram (CELEXA) 40 MG tablet TAKE 1 TABLET BY MOUTH DAILY 90 tablet 3    pioglitazone (ACTOS) 15 MG tablet TAKE 1 TABLET BY MOUTH DAILY 90 tablet 3    valsartan (DIOVAN) 320 MG tablet Take 1 tablet by mouth daily 90 tablet 3    glipiZIDE (GLUCOTROL XL) 10 MG extended release tablet Take 1 tablet by mouth 2 times daily (with meals) 180 tablet 3    metFORMIN (GLUCOPHAGE) 1000 MG tablet Take 1 tablet by mouth 2 times daily (with meals) 180 tablet 3    omeprazole (PRILOSEC) 40 MG delayed release capsule Take 1 capsule by mouth daily 90 capsule 3    Insulin Pen Needle 32G X 4 MM MISC 1 each by Does not apply route daily 100 each 3    blood glucose monitor strips Test 2 times a day & as needed for symptoms of irregular blood glucose. Test strips covered by patients insurance. DX e11.9 100 strip 0    calcium carbonate 600 MG TABS tablet Take 1 tablet by mouth daily       No current facility-administered medications for this visit.         ROS:  Constitutional negative for fevers, fatigue, unexpected weight changes  HEENT: positive for

## 2020-04-15 NOTE — PATIENT INSTRUCTIONS
[email or web address]    I don't know if you had COVID or not but given your exposure history to your nephew and his wife, it is very possible. I would definitely like to get you tested once we have antibody testing available. COVID symptoms are as follows:  Fever (most common)  Cough (most commonly dry but can be productive)  Sore throat  Runny nose  Conjunctivitis/pink eye  Diarrhea  Body aches  Shortness of breath  Loss of sense of taste or smell  Loss of appetite  Extreme fatigue      Continue use of decongestants to treat your symptoms. Take tessalon during the day to help with your cough and reserve your cough medicine for bedtime. I am hoping that the cough medicine will allow you to use your CPAP machine. The albuterol I think is going to be the most helpful. You may use this every 4-6 hours as needed for ongoing cough and chest tightness. It will make you feel a little jittery initially. This is a normal response to the medication. Feel better soon!

## 2020-05-05 RX ORDER — ALBUTEROL SULFATE 90 UG/1
AEROSOL, METERED RESPIRATORY (INHALATION)
Qty: 18 G | Refills: 3 | Status: SHIPPED | OUTPATIENT
Start: 2020-05-05 | End: 2021-04-27

## 2020-06-03 RX ORDER — PIOGLITAZONEHYDROCHLORIDE 15 MG/1
15 TABLET ORAL DAILY
Qty: 30 TABLET | Refills: 0 | Status: SHIPPED | OUTPATIENT
Start: 2020-06-03 | End: 2020-07-06

## 2020-07-06 ENCOUNTER — VIRTUAL VISIT (OUTPATIENT)
Dept: INTERNAL MEDICINE CLINIC | Age: 65
End: 2020-07-06
Payer: COMMERCIAL

## 2020-07-06 PROBLEM — J98.01 POST-INFECTION BRONCHOSPASM: Status: RESOLVED | Noted: 2020-04-15 | Resolved: 2020-07-06

## 2020-07-06 PROBLEM — J06.9 VIRAL UPPER RESPIRATORY TRACT INFECTION: Status: RESOLVED | Noted: 2019-11-05 | Resolved: 2020-07-06

## 2020-07-06 PROBLEM — Z20.822 SUSPECTED COVID-19 VIRUS INFECTION: Status: ACTIVE | Noted: 2020-07-06

## 2020-07-06 PROBLEM — R47.89 WORD FINDING DIFFICULTY: Status: ACTIVE | Noted: 2020-07-06

## 2020-07-06 PROBLEM — Z86.16 HISTORY OF 2019 NOVEL CORONAVIRUS DISEASE (COVID-19): Status: ACTIVE | Noted: 2020-07-06

## 2020-07-06 PROCEDURE — 99214 OFFICE O/P EST MOD 30 MIN: CPT | Performed by: INTERNAL MEDICINE

## 2020-07-06 RX ORDER — PIOGLITAZONEHYDROCHLORIDE 15 MG/1
15 TABLET ORAL DAILY
Qty: 30 TABLET | Refills: 0 | Status: SHIPPED | OUTPATIENT
Start: 2020-07-06 | End: 2020-07-28

## 2020-07-06 NOTE — ASSESSMENT & PLAN NOTE
Patient notes difficulty with word findings. Suspect this is exacerbated by poor blood sugar control as well as poor compliance with CPAP. Discussed with patient. Recommend MOCA at office visit this fall.

## 2020-07-06 NOTE — ASSESSMENT & PLAN NOTE
Suspected based on symptoms of cough, congestion, loss of taste and smell, and fevers as well as known positive exposure. In March 2020. Check antibodies.

## 2020-07-06 NOTE — PROGRESS NOTES
TELEHEALTH EVALUATION -- Audio/Visual (During IMMAO-67 public health emergency)    HPI    Seen today via video visit. DM- states that his sugars are not good as he has not been taking his pills as he should. Both gives him diarrhea and nausea. Has only been really bothering him for the last several months. Is taking Lantus 60 units qd. Sugars have been running in the upper 200's. HTN- BP is a little high today at 145/95. Typically, BP is 110-130/60-80's. Highest other than today was 137/92. No side effects with valsartan 320 mg. Did have some difficulty getting his valsartan from his pharmacy. Depression- comes and goes. Has been doing ok. Is still \"seeing\" a women in [place], [place]. EDIE is still living with him and helping him which is helping. States he is having difficulties with his memory. Does \"try\" to wear his CPAP. Will notice that he is having difficulty remembering what he wants to say but then will be gone and then come back to him. Does notice that his memory is better when his sugars are better controlled. Every once in a while will end up with some coughing spells. Sense of smell and taste have not fully recovered. Was ill for about 4-5 weeks, the first 4 weeks were \"really, really bad\" and then started feeling better by the 5th week. Nephew had confirmed COVID. Patient was exposed to him. Patient was never tested for COVID. Past Medical History:   Diagnosis Date    Appendiceal tumor     Asthma     Cervical spinal stenosis     Degenerative disc disease, lumbar     history of herniated disc at L4-5 with L5 radiculopathy.  pt had L4-5 discectomy with Dr Meaghan Terrell    Diabetes mellitus ([facility] [address]     Esophageal reflux     Gout     Gross hematuria     Hand cramp     Hemorrhoid     Hepatitis     High blood pressure     Hyperlipidemia     Lumbar herniated disc     Major depressive disorder, recurrent episode, mild (HCC)     Postnasal drip     Restless leg syndrome     Seasonal allergies     Sleep apnea     Unilateral hearing loss        Past Surgical History:   Procedure Laterality Date    APPENDECTOMY  May 5, 2008    BACK SURGERY      [facility]    NECK SURGERY      [facility]       Family History   Problem Relation Age of Onset    Cancer Mother     Cancer Father     Heart Disease Father     High Blood Pressure Father     Diabetes Sister        No Known Allergies    Current Outpatient Medications   Medication Sig Dispense Refill    pioglitazone (ACTOS) 15 MG tablet Take 1 tablet by mouth daily. 30 tablet 0    metFORMIN (GLUCOPHAGE) 500 MG tablet Take 1 tablet by mouth 2 times daily (with meals) 60 tablet 0    albuterol sulfate  (90 Base) MCG/ACT inhaler INHALE 2 PUFFS INTO THE LUNGS FOUR TIMES DAILY AS NEEDED FOR WHEEZING 18 g 3    LANTUS SOLOSTAR 100 UNIT/ML injection pen ADMINISTER 60 UNITS UNDER THE SKIN AT BEDTIME 15 mL 5    rosuvastatin (CRESTOR) 40 MG tablet TAKE 1 TABLET BY MOUTH DAILY 90 tablet 3    rOPINIRole (REQUIP) 1 MG tablet TAKE 1 TABLET BY MOUTH THREE TIMES DAILY 270 tablet 3    citalopram (CELEXA) 40 MG tablet TAKE 1 TABLET BY MOUTH DAILY 90 tablet 3    valsartan (DIOVAN) 320 MG tablet Take 1 tablet by mouth daily 90 tablet 3    glipiZIDE (GLUCOTROL XL) 10 MG extended release tablet Take 1 tablet by mouth 2 times daily (with meals) 180 tablet 3    omeprazole (PRILOSEC) 40 MG delayed release capsule Take 1 capsule by mouth daily 90 capsule 3    Insulin Pen Needle 32G X 4 MM MISC 1 each by Does not apply route daily 100 each 3    blood glucose monitor strips Test 2 times a day & as needed for symptoms of irregular blood glucose. Test strips covered by patients insurance. DX e11.9 100 strip 0    calcium carbonate 600 MG TABS tablet Take 1 tablet by mouth daily       No current facility-administered medications for this visit.         Review of Systems -   ROS:  Constitutional negative for fevers, fatigue, unexpected weight changes  HEENT: negative for congestion, ST, ear pain  Eyes: no discharge, no change in vision  Endo: no low blood sugars, no polyurea, polydipsia,   Heart: no chest pain, no palpitations, no LE edema  Pulm: no SOB, wheezing, cough  GI: Positive for nausea, positive for diarrhea  : no dysuria  Psych: no depression, no anxiety, no suicidal thoughts  Neuro: no weakness, no gait problems, no paresthesias, no numbness, no HA, no lightheadedness, no dizziness, positive for memory difficulties        PHYSICAL EXAMINATION:  [ INSTRUCTIONS:  \"[x]\" Indicates a positive item  \"[]\" Indicates a negative item  -- DELETE ALL ITEMS NOT EXAMINED]       Constitutional:  [x] Appears well-developed and well-nourished [x] No apparent distress     [] Abnormal-   Mental status  [x] Alert and awake  [x] Oriented to person/place/time []Able to follow commands      Eyes:  EOM     [x]  Normal  [] Abnormal-  Sclera   [x]  Normal  [] Abnormal -         Discharge  [x]  None visible  [] Abnormal -    HENT:   [x] Normocephalic, atraumatic.   [] Abnormal   [x] Mouth/Throat: Mucous membranes are moist.  [x] Normal hearing    External Ears [x] Normal  [] Abnormal-     Neck: [x] No visualized mass     Pulmonary/Chest:  [x] Respiratory effort normal.  [x] No visualized signs of difficulty breathing or respiratory distress         [] Abnormal-      Musculoskeletal: [x] Normal gait with no signs of ataxia          [x] Normal range of motion of neck         [] Abnormal-     Neurological:         [x] No Facial Asymmetry (Cranial nerve 7 motor function) (limited exam to video visit)           [x] No gaze palsy         [] Abnormal-         Skin:  [x] No significant exanthematous lesions or discoloration noted on facial skin          [] Abnormal-            Psychiatric:  [x] Normal Affect [x] Normal Mood        [] Abnormal-     Other pertinent observable physical exam findings-     Due to this being a TeleHealth encounter, evaluation of the following organ systems is limited: Vitals/Constitutional/EENT/Resp/CV/GI//MS/Neuro/Skin/Heme-Lymph-Imm. Assessment and Plan  Essential hypertension  Mildly elevated today but in general is well-controlled on valsartan 320 mg daily. Continue. History of 2019 novel coronavirus disease (COVID-19)  In March 2020. Suspected. Check COVID antibodies. Hyperlipidemia LDL goal <70  Continue Crestor 40 mg daily. Check lipid panel and CMP. Suspected COVID-19 virus infection  Suspected based on symptoms of cough, congestion, loss of taste and smell, and fevers as well as known positive exposure. In March 2020. Check antibodies. Type 2 diabetes mellitus without complication, with long-term current use of insulin ([address]  Likely poorly controlled based on blood sugar readings. He is not taking his oral medications as they are \"making him sick\". Most likely, the metformin is causing diarrhea and GI upset. Will try decreasing metformin to 500 mg twice daily and he may discontinue if he does not tolerate this. Continue Actos 15 mg daily and glipizide ER 10 mg twice daily. Increase Lantus to 70 units nightly. If no improvement after 1 week, he should increase his Lantus to 74 units in and 78 units. If he is not able to get his sugars down under 200, will change to 70/30 twice daily with breakfast and dinner. He has not likely to be compliant with carb counting or basal bolus insulin dosing. Word finding difficulty  Patient notes difficulty with word findings. Suspect this is exacerbated by poor blood sugar control as well as poor compliance with CPAP. Discussed with patient. Recommend MOCA at office visit this fall. MED (obstructive sleep apnea)  Discussed importance of compliance with CPAP, particularly with regards to his cognitive abilities. Non compliance w medication regimen  Patient reports noncompliance with diabetes medications. He attributes this to them \"making him sick\".   Most likely this is metformin. Decrease metformin to 500 mg twice daily and if he is continuing to have GI symptoms he may discontinue metformin. Importance of medication compliance discussed with patient. Pursuant to the emergency declaration under the [facility] waiver authority and the [employer] Act, this Virtual  Visit was conducted, with patient's consent, to reduce the patient's risk of exposure to COVID-19 and provide continuity of care for an established patient. Services were provided through a video synchronous discussion virtually to substitute for in-person clinic visit.

## 2020-07-06 NOTE — ASSESSMENT & PLAN NOTE
Patient reports noncompliance with diabetes medications. He attributes this to them \"making him sick\". Most likely this is metformin. Decrease metformin to 500 mg twice daily and if he is continuing to have GI symptoms he may discontinue metformin. Importance of medication compliance discussed with patient.

## 2020-07-06 NOTE — ASSESSMENT & PLAN NOTE
Likely poorly controlled based on blood sugar readings. He is not taking his oral medications as they are \"making him sick\". Most likely, the metformin is causing diarrhea and GI upset. Will try decreasing metformin to 500 mg twice daily and he may discontinue if he does not tolerate this. Continue Actos 15 mg daily and glipizide ER 10 mg twice daily. Increase Lantus to 70 units nightly. If no improvement after 1 week, he should increase his Lantus to 74 units in and 78 units. If he is not able to get his sugars down under 200, will change to 70/30 twice daily with breakfast and dinner. He has not likely to be compliant with carb counting or basal bolus insulin dosing.

## 2020-07-07 ENCOUNTER — CARE COORDINATION (OUTPATIENT)
Dept: CARE COORDINATION | Age: 65
End: 2020-07-07

## 2020-07-07 NOTE — TELEPHONE ENCOUNTER
I agree with the Care Coordinator's Plan of Care     Thanks. My plan is to change him to 70/30 if his sugars do not come down.  saw

## 2020-07-07 NOTE — CARE COORDINATION
Ambulatory Care Coordination Note  7/7/2020  CM Risk Score: 2  Charlson 10 Year Mortality Risk Score: 23%     ACC: José Luis Powell, RN    Summary Note: Call to enroll in care management, per pcp request. Patient indicates that he has long record of noncompliance with medications, especially due to abdominal symptoms including diarrhea. Reviewed changes pcp made at v[facility] yesterday and educated that patient needs to be consistent with taking medications as ordered in order to see how changes impact his abdominal symptoms. Reviewed forgetfulness with taking medications and discussed setting multiple alarms as a reminder, moving medications for a visual reminder. Agreed to maintain contact with RN to review success with this plan and to allow for future adjustments to be made, if needed. PLAN  Follow up consistency of medication compliance. Follow up blood glucose levels, side effects of medications. Consider if changes need to be made to have consistent blood glucose levels under 200. Ambulatory Care Coordination Assessment    Care Coordination Protocol  Program Enrollment:  Rising Risk  Referral from Primary Care Provider:  Yes  Week 1 - Initial Assessment     Do you have all of your prescriptions and are they filled?:  Yes  Barriers to medication adherence:  Forgets to take, Other  Other barriers to medication adherence:  side effects of meds causing abdominal pain and diarrhea  Are you able to afford your medications?:  Yes  How often do you have trouble taking your medications the way you have been told to take them?:  I seldom take them as prescribed. Do you have Home O2 Therapy?:  No      Ability to seek help/take action for Emergent Urgent situations i.e. fire, crime, inclement weather or health crisis. :  Independent  Ability to ambulate to restroom:  Independent  Ability handle personal hygeine needs (bathing/dressing/grooming): Independent  Ability to manage Medications:   Independent  Ability to prepare Food Preparation:  Independent  Ability to maintain home (clean home, laundry): Independent  Ability to drive and/or has transportation:  Independent  Ability to do shopping:  Independent  Ability to manage finances: Independent  Is patient able to live independently?:  Yes     Current Housing:  Private Residence        Per the Fall Risk Screening, did the patient have 2 or more falls or 1 fall with injury in the past year?:  No        Are you experiencing loss of meaning?:  No  Are you experiencing loss of hope and peace?:  No     Thinking about your patient's physical health needs, are there any symptoms or problems (risk indicators) you are unsure about that require further investigation?:  Moderate to severe symptoms or problems that impact on daily life   Are the patients physical health problems impacting on their mental well-being?:  Mild impact on mental well-being e.g. \"\"feeling fed-up\"\", \"\"reduced enjoyment\"\"   Are there any problems with your patients lifestyle behaviors (alcohol, drugs, diet, exercise) that are impacting on physical or mental well-being?:  No identified areas of concern   Do you have any other concerns about your patients mental well-being?  How would you rate their severity and impact on the patient?:  No identified areas of concern   How would you rate their home environment in terms of safety and stability (including domestic violence, insecure housing, neighbor harassment)?:  Consistently safe, supportive, stable, no identified problems   How do daily activities impact on the patient's well-being? (include current or anticipated unemployment, work, caregiving, access to transportation or other):  No identified problems or perceived positive benefits   How would you rate their social network (family, work, friends)?:  Adequate participation with social networks   How would you rate their financial resources (including ability to afford all required medical care)?: Financially secure, some resource challenges   How wells does the patient now understand their health and well-being (symptoms, signs or risk factors) and what they need to do to manage their health?:  Reasonable to good understanding but do not feel able to engage with advice at this time   How well do you think your patient can engage in healthcare discussions? (Barriers include language, deafness, aphasia, alcohol or drug problems, learning difficulties, concentration):  Clear and open communication, no identified barriers   Do other services need to be involved to help this patient?:  Other care/services not required at this time   Suggested Interventions and Community Resources  Diabetes Education:   (Comment: Medication understanding and noncompliance)    Medi Set or Pill Pack:  Completed   Pharmacist:  Declined   Zone Management Tools:  Not Started         Set up/Review an Education Plan, Set up/Review Goals              Prior to Admission medications    Medication Sig Start Date End Date Taking? Authorizing Provider   pioglitazone (ACTOS) 15 MG tablet Take 1 tablet by mouth daily.   Patient taking differently: Take 15 mg by mouth daily Inconsistent use 7/6/20  Yes Jose Angel Gipson DO   metFORMIN (GLUCOPHAGE) 500 MG tablet Take 1 tablet by mouth 2 times daily (with meals)  Patient taking differently: Take 500 mg by mouth 2 times daily (with meals) Inconsistent use 7/6/20  Yes Jose Angel Gipson DO   LANTUS SOLOSTAR 100 UNIT/ML injection pen ADMINISTER 60 UNITS UNDER THE SKIN AT BEDTIME 4/3/20  Yes Jose Angel Gipson DO   glipiZIDE (GLUCOTROL XL) 10 MG extended release tablet Take 1 tablet by mouth 2 times daily (with meals)  Patient taking differently: Take 10 mg by mouth 2 times daily (with meals) Inconsistent use 2/24/20  Yes Jose Angel Gipson DO   albuterol sulfate  (90 Base) MCG/ACT inhaler INHALE 2 PUFFS INTO THE LUNGS FOUR TIMES DAILY AS NEEDED FOR WHEEZING 5/5/20   Jose Angel Gipson DO rosuvastatin (CRESTOR) 40 MG tablet TAKE 1 TABLET BY MOUTH DAILY 3/31/20   Shawna Bae,    rOPINIRole (REQUIP) 1 MG tablet TAKE 1 TABLET BY MOUTH THREE TIMES DAILY 3/31/20   Shawna Bae, DO   citalopram (CELEXA) 40 MG tablet TAKE 1 TABLET BY MOUTH DAILY 3/31/20   Shawna Bae, DO   valsartan (DIOVAN) 320 MG tablet Take 1 tablet by mouth daily 3/18/20   Shawna Bae, DO   omeprazole (PRILOSEC) 40 MG delayed release capsule Take 1 capsule by mouth daily 2/24/20   Shawna Bae, DO   Insulin Pen Needle 32G X 4 MM MISC 1 each by Does not apply route daily 2/24/20   Shawna Bae, DO   blood glucose monitor strips Test 2 times a day & as needed for symptoms of irregular blood glucose. Test strips covered by patients insurance. DX e11.9 1/15/20   Shawna Bae DO   calcium carbonate 600 MG TABS tablet Take 1 tablet by mouth daily    Historical Provider, MD       No future appointments. ,   Diabetes Assessment    Meal Planning:  None   How often do you test your blood sugar?:  Daily   Do you have barriers with adherence to non-pharmacologic self-management interventions?  (Nutrition/Exercise/Self-Monitoring):  Yes           and   General Assessment    Do you have any symptoms that are causing concern?:  Yes  Progression since Onset:  Unchanged  Reported Symptoms:  Abdominal Pain (Comment: diarrhea when taking dm medications)

## 2020-09-16 ENCOUNTER — CARE COORDINATION (OUTPATIENT)
Dept: CARE COORDINATION | Age: 65
End: 2020-09-16

## 2020-09-24 ENCOUNTER — CARE COORDINATION (OUTPATIENT)
Dept: CARE COORDINATION | Age: 65
End: 2020-09-24

## 2020-09-24 NOTE — TELEPHONE ENCOUNTER
a1cs have been done and are in [place] (he gets them done at [address] in [facility])- most recent was in Feb and was 11.0. May visit was virtual and he did not get his labs. Does need appointment in person. Please ask him to call to schedule and we can fax lab orders to [facility]. E's to get blood work done prior to appointment (has open orders in Epic that can be faxed). Please have him stop Glipizide. I will change him to 70/30 at his next visit which may help with compliance. I do think that depression plays a large role in his non-compliance. His wife  in 2017 and I think that he is still really struggling with her death. His compliance waxes and wanes with his mood.      I agree with the [place]

## 2020-09-24 NOTE — CARE COORDINATION
Ambulatory Care Coordination Note  9/24/2020  CM Risk Score: 2  Charlson 10 Year Mortality Risk Score: 23%     ACC: James Good RN    Summary Note: Patient is noncompliant with medication and testing regimen. States gets nausea consistently and immediately after taking glipizide. Denies symptoms with metformin. States blood glucose levels in 200-260's when he tests. Denies hyperglycemia symptoms. Not willing to work on setting goals to change his testing or compliance. Care Coordination Interventions    Program Enrollment:  Rising Risk  Referral from Primary Care Provider:  Yes  Suggested Interventions and Community Resources  Diabetes Education:   (Comment: Medication understanding and noncompliance)  Medi Set or Pill Pack:  Completed (Comment: Has pillpacks. Encouraged reminder system phone alarms and note on refrigerator or bathroom mirror)  Pharmacist:  Declined  Zone Management Tools:  Not Started         Goals Addressed                 This Visit's Progress       Care Coordination     COMPLETED: Medication Management   No change     I will take my medication as directed. I will notify my provider of any problems with medications, like adverse effects or side effects. I will notify my provider for advice before I stop taking any of my medication. Barriers: lack of motivation, lack of support and medication side effects  Plan for overcoming my barriers: Patient agrees to try adjusted medication doses, as higher doses were causing abdominal pain and diarrhea. He agrees to monitor for symptoms and report to RN, as adjustments will be made if needed, per pcp. Patient will set multiple alarms to remind him to take medications. He will consider moving pills to a location that will be a visual reminder to take medications. Confidence: 6/10  Anticipated Goal Completion Date: 09/07            Prior to Admission medications    Medication Sig Start Date End Date Taking?  Authorizing Provider pioglitazone (ACTOS) 15 MG tablet Take 1 tablet by mouth daily. 7/28/20  Yes Deonte Mcneill DO   metFORMIN (GLUCOPHAGE) 500 MG tablet Take 1 tablet by mouth 2 times daily (with meals)  Patient taking differently: Take 500 mg by mouth daily (with breakfast) Inconsistent use. Was ordered twice daily, but only taking daily. 7/6/20  Yes Deonte Mcneill DO   LANTUS SOLOSTAR 100 UNIT/ML injection pen ADMINISTER 60 UNITS UNDER THE SKIN AT BEDTIME  Patient taking differently: Inject 78 Units into the skin nightly  4/3/20  Yes Deonte Mcneill DO   glipiZIDE (GLUCOTROL XL) 10 MG extended release tablet Take 1 tablet by mouth 2 times daily (with meals)  Patient taking differently: Take 10 mg by mouth 2 times daily (with meals) Inconsistent use 2/24/20  Yes Deonte Mcneill DO   Insulin Pen Needle 32G X 4 MM MISC 1 each by Does not apply route daily 2/24/20  Yes Deonte Mcneill DO   blood glucose monitor strips Test 2 times a day & as needed for symptoms of irregular blood glucose. Test strips covered by patients insurance. DX e11.9  Patient taking differently: Test 2 times a day & as needed for symptoms of irregular blood glucose. Test strips covered by patients insurance. DX e11.9    Inconsistent testing. Travels for work and doesn't always take with him.  1/15/20  Yes Deonte Mcneill DO   albuterol sulfate  (90 Base) MCG/ACT inhaler INHALE 2 PUFFS INTO THE LUNGS FOUR TIMES DAILY AS NEEDED FOR WHEEZING 5/5/20   Deonte Mcneill, DO   rosuvastatin (CRESTOR) 40 MG tablet TAKE 1 TABLET BY MOUTH DAILY 3/31/20   [facility], [facility]   rOPINIRole (REQUIP) 1 MG tablet TAKE 1 TABLET BY MOUTH THREE TIMES DAILY 3/31/20   [facility], DO   citalopram (CELEXA) 40 MG tablet TAKE 1 TABLET BY MOUTH DAILY 3/31/20   [facility], DO   valsartan (DIOVAN) 320 MG tablet Take 1 tablet by mouth daily 3/18/20   [facility] [place] [facility], DO   omeprazole (PRILOSEC) 40 MG delayed release capsule Take 1 capsule by

## 2020-09-25 NOTE — CARE COORDINATION
Instructed to stop taking glipizide now and that pcp will likely change insulin at next appointment. Informed that Dr. Patel Erps wants to see patient in person at next appointment and reminded to have all blood work done the week before the appointment. Verbalized understanding.

## 2021-01-06 ENCOUNTER — NURSE TRIAGE (OUTPATIENT)
Dept: OTHER | Facility: CLINIC | Age: 66
End: 2021-01-06

## 2021-01-06 DIAGNOSIS — Z79.4 TYPE 2 DIABETES MELLITUS WITHOUT COMPLICATION, WITH LONG-TERM CURRENT USE OF INSULIN (HCC): ICD-10-CM

## 2021-01-06 DIAGNOSIS — I10 ESSENTIAL HYPERTENSION: Primary | ICD-10-CM

## 2021-01-06 DIAGNOSIS — E11.9 TYPE 2 DIABETES MELLITUS WITHOUT COMPLICATION, WITH LONG-TERM CURRENT USE OF INSULIN (HCC): ICD-10-CM

## 2021-01-06 DIAGNOSIS — E78.5 HYPERLIPIDEMIA LDL GOAL <70: ICD-10-CM

## 2021-01-06 NOTE — TELEPHONE ENCOUNTER
Please have him take an additional dose of glipizide now, needs to limit carbs. He is also very overdue for a f/u and labs. Please schedule. Labs ordered. Should be OV if able. Labs have been ordered. He should bring all of his medication bottles with him to his appointment as I have that his glipizide was stopped.   saw
patient. The patient was connected to triage in response to information provided to the ECC. Please do not respond through this encounter as the response is not directed to a shared pool.

## 2021-01-08 ENCOUNTER — TELEPHONE (OUTPATIENT)
Dept: INTERNAL MEDICINE CLINIC | Age: 66
End: 2021-01-08

## 2021-01-08 NOTE — TELEPHONE ENCOUNTER
Pt calling would like to have his lab orders faxed to [facility]  [phone]. Thanks.

## 2021-01-18 ENCOUNTER — OFFICE VISIT (OUTPATIENT)
Dept: INTERNAL MEDICINE CLINIC | Age: 66
End: 2021-01-18
Payer: MEDICARE

## 2021-01-18 VITALS
SYSTOLIC BLOOD PRESSURE: 124 MMHG | BODY MASS INDEX: 35.22 KG/M2 | OXYGEN SATURATION: 99 % | DIASTOLIC BLOOD PRESSURE: 84 MMHG | HEART RATE: 72 BPM | WEIGHT: 205.2 LBS

## 2021-01-18 DIAGNOSIS — Z91.14 NON COMPLIANCE W MEDICATION REGIMEN: ICD-10-CM

## 2021-01-18 DIAGNOSIS — E78.5 HYPERLIPIDEMIA LDL GOAL <70: ICD-10-CM

## 2021-01-18 DIAGNOSIS — G25.81 RESTLESS LEG: ICD-10-CM

## 2021-01-18 DIAGNOSIS — R20.0 NUMBNESS IN BOTH HANDS: ICD-10-CM

## 2021-01-18 DIAGNOSIS — I10 ESSENTIAL HYPERTENSION: Primary | ICD-10-CM

## 2021-01-18 DIAGNOSIS — F43.21 GRIEF: ICD-10-CM

## 2021-01-18 DIAGNOSIS — Z79.4 TYPE 2 DIABETES MELLITUS WITHOUT COMPLICATION, WITH LONG-TERM CURRENT USE OF INSULIN (HCC): ICD-10-CM

## 2021-01-18 DIAGNOSIS — M50.30 DEGENERATIVE CERVICAL DISC: ICD-10-CM

## 2021-01-18 DIAGNOSIS — F32.9 REACTIVE DEPRESSION: ICD-10-CM

## 2021-01-18 DIAGNOSIS — E11.9 TYPE 2 DIABETES MELLITUS WITHOUT COMPLICATION, WITH LONG-TERM CURRENT USE OF INSULIN (HCC): ICD-10-CM

## 2021-01-18 PROCEDURE — G8417 CALC BMI ABV UP PARAM F/U: HCPCS | Performed by: INTERNAL MEDICINE

## 2021-01-18 PROCEDURE — 2022F DILAT RTA XM EVC RTNOPTHY: CPT | Performed by: INTERNAL MEDICINE

## 2021-01-18 PROCEDURE — 3046F HEMOGLOBIN A1C LEVEL >9.0%: CPT | Performed by: INTERNAL MEDICINE

## 2021-01-18 PROCEDURE — 1036F TOBACCO NON-USER: CPT | Performed by: INTERNAL MEDICINE

## 2021-01-18 PROCEDURE — 99215 OFFICE O/P EST HI 40 MIN: CPT | Performed by: INTERNAL MEDICINE

## 2021-01-18 PROCEDURE — G8427 DOCREV CUR MEDS BY ELIG CLIN: HCPCS | Performed by: INTERNAL MEDICINE

## 2021-01-18 PROCEDURE — G8484 FLU IMMUNIZE NO ADMIN: HCPCS | Performed by: INTERNAL MEDICINE

## 2021-01-18 PROCEDURE — 3017F COLORECTAL CA SCREEN DOC REV: CPT | Performed by: INTERNAL MEDICINE

## 2021-01-18 PROCEDURE — 4040F PNEUMOC VAC/ADMIN/RCVD: CPT | Performed by: INTERNAL MEDICINE

## 2021-01-18 PROCEDURE — 1123F ACP DISCUSS/DSCN MKR DOCD: CPT | Performed by: INTERNAL MEDICINE

## 2021-01-18 RX ORDER — DULAGLUTIDE 0.75 MG/.5ML
0.75 INJECTION, SOLUTION SUBCUTANEOUS WEEKLY
Qty: 4 PEN | Refills: 1 | Status: SHIPPED | OUTPATIENT
Start: 2021-01-18 | End: 2021-03-08

## 2021-01-18 RX ORDER — PIOGLITAZONEHYDROCHLORIDE 15 MG/1
15 TABLET ORAL DAILY
Qty: 90 TABLET | Refills: 3 | Status: SHIPPED | OUTPATIENT
Start: 2021-01-18 | End: 2021-11-01

## 2021-01-18 RX ORDER — ROSUVASTATIN CALCIUM 40 MG/1
40 TABLET, COATED ORAL DAILY
Qty: 90 TABLET | Refills: 3 | Status: SHIPPED | OUTPATIENT
Start: 2021-01-18 | End: 2021-11-01

## 2021-01-18 RX ORDER — METFORMIN HYDROCHLORIDE 750 MG/1
1500 TABLET, EXTENDED RELEASE ORAL
Qty: 60 TABLET | Refills: 5 | Status: SHIPPED | OUTPATIENT
Start: 2021-01-18 | End: 2021-05-14

## 2021-01-18 RX ORDER — FLASH GLUCOSE SENSOR
1 KIT MISCELLANEOUS
Qty: 1 DEVICE | Refills: 0 | Status: SHIPPED | OUTPATIENT
Start: 2021-01-18 | End: 2021-04-27

## 2021-01-18 RX ORDER — OMEPRAZOLE 40 MG/1
40 CAPSULE, DELAYED RELEASE ORAL DAILY
Qty: 90 CAPSULE | Refills: 3 | Status: SHIPPED | OUTPATIENT
Start: 2021-01-18 | End: 2021-11-01

## 2021-01-18 RX ORDER — VALSARTAN 320 MG/1
320 TABLET ORAL DAILY
Qty: 90 TABLET | Refills: 3 | Status: SHIPPED | OUTPATIENT
Start: 2021-01-18 | End: 2021-11-01

## 2021-01-18 RX ORDER — CITALOPRAM 40 MG/1
40 TABLET ORAL DAILY
Qty: 90 TABLET | Refills: 3 | Status: SHIPPED | OUTPATIENT
Start: 2021-01-18 | End: 2021-01-18 | Stop reason: ALTCHOICE

## 2021-01-18 RX ORDER — FLASH GLUCOSE SENSOR
1 KIT MISCELLANEOUS
Qty: 3 EACH | Refills: 5 | Status: SHIPPED | OUTPATIENT
Start: 2021-01-18 | End: 2021-04-27

## 2021-01-18 RX ORDER — INSULIN GLARGINE 100 [IU]/ML
INJECTION, SOLUTION SUBCUTANEOUS
Qty: 15 ML | Refills: 5 | Status: CANCELLED | OUTPATIENT
Start: 2021-01-18

## 2021-01-18 NOTE — PROGRESS NOTES
Patient: Eli Severs is a 77 y.o. male who presents today with the following Chief Complaint(s):  Chief Complaint   Patient presents with    Check-Up       HPI     Here today for follow up. Had labs done at [facility]. E's. Labs reviewed/scanned to chart. Had POCT HbA1c done through his insurance company and was 10.3%- was done about 3 weeks ago. [facility] Labs: HbA1c 10.2%                     Microalbumin:Creatinine 11.1    DM- has been taking his medication but takes it all together right after eating dinner. Morning sugars have been running in the 50-70's fasting. Running in the 130's-270's before meals. Had an episode where his sugar was up into the 400's. Since then, has been eating better. Getting Home  meals to cook at home. Is eating rodas and eggs or Cheeri-O's for breakfast.     Drinks 1-2 beers several days per week. Wondering what foods he can take. HLD- remains on Crestor. No side effects. T. Chol 139//LDL 74/HDL 40    His EDIE moved out after 20 years. He is living on his own for the first time. In-laws always lived with he and his late-wife. Has been really missing his wife who  3 years ago on 18. Has been able to grieve better and move on since his EDIE moved out. Did go through a grief class but found that the other widows were \"hitting\" on him. Has not been taking Celexa. Not sure why he stopped taking it, thinks maybe in part related difficulties with erections. Also stopped taking Requip. Did not like how it made him feel and made him too sleepy. Has had to stop working on gun restoration as he has been having more numbness in his hands and fingers related to his neck. Has been walking on a treadmill (bought a treadmill for his home).      Creatinine 0.8   CBC wnl    No Known Allergies   Past Medical History:   Diagnosis Date    Appendiceal tumor     Asthma     Cervical spinal stenosis     Degenerative disc disease, lumbar history of herniated disc at L4-5 with L5 radiculopathy. pt had L4-5 discectomy with Dr Claudia Robledo    Diabetes mellitus ([facility] [address]     Esophageal reflux     Gout     Gross hematuria     Hand cramp     Hemorrhoid     Hepatitis     High blood pressure     Hyperlipidemia     Lumbar herniated disc     Major depressive disorder, recurrent episode, mild (HCC)     Postnasal drip     Restless leg syndrome     Seasonal allergies     Sleep apnea     Unilateral hearing loss       Past Surgical History:   Procedure Laterality Date    APPENDECTOMY  May 5, 2008    BACK SURGERY      [facility]    NECK SURGERY      [facility]      Social History     Socioeconomic History    Marital status:      Spouse name: Not on file    Number of children: Not on file    Years of education: Not on file    Highest education level: Not on file   Occupational History    Occupation:    Social Needs    Financial resource strain: Not on file    Food insecurity     Worry: Not on file     Inability: Not on file    Transportation needs     Medical: Not on file     Non-medical: Not on file   Tobacco Use    Smoking status: Never Smoker    Smokeless tobacco: Never Used   Substance and Sexual Activity    Alcohol use:  Yes     Alcohol/week: 2.0 - 7.0 standard drinks     Types: 2 - 7 Cans of beer per week    Drug use: No    Sexual activity: Not on file   Lifestyle    Physical activity     Days per week: Not on file     Minutes per session: Not on file    Stress: Not on file   Relationships    Social connections     Talks on phone: Not on file     Gets together: Not on file     Attends Shinto service: Not on file     Active member of club or organization: Not on file     Attends meetings of clubs or organizations: Not on file     Relationship status: Not on file    Intimate partner violence     Fear of current or ex partner: Not on file     Emotionally abused: Not on file  omeprazole (PRILOSEC) 40 MG delayed release capsule Take 1 capsule by mouth daily 90 capsule 3    Insulin Pen Needle 32G X 4 MM MISC 1 each by Does not apply route daily 100 each 3     No facility-administered medications prior to visit. Patient'spast medical history, surgical history, family history, medications,  and allergies  were all reviewed and updated as appropriate today. Review of Systems   Constitutional: Negative for appetite change, fatigue and fever. Respiratory: Negative for chest tightness and shortness of breath. Cardiovascular: Negative for chest pain. Gastrointestinal: Negative for constipation and diarrhea. Skin: Negative for rash. /84   Pulse 72   Wt 205 lb 3.2 oz (93.1 kg)   SpO2 99%   BMI 35.22 kg/m²   Physical Exam  Vitals signs and nursing note reviewed. Constitutional:       Appearance: He is well-developed. He is not toxic-appearing. HENT:      Head: Normocephalic. Right Ear: Tympanic membrane, ear canal and external ear normal.      Left Ear: Tympanic membrane, ear canal and external ear normal.   Eyes:      General: No scleral icterus. Extraocular Movements: Extraocular movements intact. Conjunctiva/sclera: Conjunctivae normal.      Pupils: Pupils are equal, round, and reactive to light. Neck:      Thyroid: No thyroid mass or thyromegaly. Vascular: No carotid bruit. Cardiovascular:      Rate and Rhythm: Normal rate and regular rhythm. Pulses:           Dorsalis pedis pulses are 2+ on the right side and 2+ on the left side. Heart sounds: Normal heart sounds. No murmur. Comments: No LE edema  Pulmonary:      Effort: Pulmonary effort is normal.      Breath sounds: Normal breath sounds. Lymphadenopathy:      Cervical: No cervical adenopathy. Neurological:      Mental Status: He is alert and oriented to person, place, and time. Cranial Nerves: No cranial nerve deficit.    Psychiatric: Mood and Affect: Mood normal.         Behavior: Behavior normal. Behavior is cooperative. ASSESSMENT/PLAN:    Problem List Items Addressed This Visit     Degenerative cervical disc     Patient has had increased numbness in his hands which is negatively impacted his ability for fine motor control. He has had to stop working on gun restoration as a result. Encourage patient to follow-up with Dr. Nicole Elizondo. Type 2 diabetes mellitus without complication, with long-term current use of insulin ([facility])     Poorly controlled with hemoglobin A1c of 10.2%. He also has an elevated microalbumin to creatinine ratio, indicating probable underlying kidney disease related to his poorly controlled diabetes. Change Lantus to NovoLog 70/30 mix 20 units twice daily with breakfast and dinner-we will likely need to adjust dose. .  This is a better option as patient is unlikely to be compliant with basal bolus dosing. Advised patient to check his sugars fasting, prior to breakfast and dinner, and 2 hours postprandial.  We will try to get patient a Freestyle Vik to improve compliance. Change Metformin to Metformin  mg 2 daily and advised she should take this with breakfast or dinner rather than at bedtime. Continue with Actos 15 mg daily, again advised she should take this with breakfast or dinner rather than at bedtime. Continue with weekly Trulicity. Stop glipizide as he is taking his medication at bedtime and this increases his risk for low blood sugars.          Relevant Medications    pioglitazone (ACTOS) 15 MG tablet    metFORMIN (GLUCOPHAGE-XR) 750 MG extended release tablet    Dulaglutide (TRULICITY) 5.95 XM/7.9HU SOPN    insulin aspart protamine-insulin aspart (NOVOLOG 70/30) (70-30) 100 UNIT/ML injection    Continuous Blood Gluc  (FREESTYLE VIK READER) SERAFIN    Continuous Blood Gluc Sensor ([address] [place] [address]) MISC    Essential hypertension - Primary Well-controlled. Continue Diovan 320 mg daily. Hyperlipidemia LDL goal <70     Continue Crestor 40 mg daily. LDL is near goal at 74. Advised patient he may take his Crestor with breakfast or supper rather than at bedtime to improve compliance with his diabetes medication. Patient previously had elevated triglycerides requiring use of fenofibrate but these have also improved. Relevant Medications    rosuvastatin (CRESTOR) 40 MG tablet    valsartan (DIOVAN) 320 MG tablet    Grief     Support given. Patient have been attending grief groups but stopped going as he felt like he was \"hit on\" by the other widows in the group. His grief is somewhat progressing since his sister-in-law has moved out of his home. Restless leg     Patient is no longer taking Requip due to side effects. States that he is sleeping well at night. Reactive depression     Self discontinued citalopram which she had been taking for many years. He felt like he was having erectile dysfunction associated with Celexa use. Does not wish to resume Celexa and does not feel as if he needs to at this time. States that he is doing relatively well, particularly since his sister-in-law and nephew moved out of his home after living with him for over 20 years. Non compliance w medication regimen     Patient has been taking his medication at bedtime rather than with meals which is likely negatively impacting his sugars as well as putting him at increased risk for hypoglycemia. Reviewed with patient that he can take all of his medications at once but they should be taken with a meal rather than after supper. Discussed importance of compliance with diabetes and that he now has proteinuria which indicates that his diabetes is adversely affecting his kidneys.          Numbness in both hands Likely secondary to degenerative disc disease in his neck. Has impacted his ability to do work on guns which he enjoys doing. Has negatively impacted his fine motor skills. Encourage patient to follow-up with Dr. Yaa Joaquin. Current Outpatient Medications   Medication Sig Dispense Refill    Insulin Pen Needle 32G X 4 MM MISC 1 each by Does not apply route 2 times daily 200 each 3    omeprazole (PRILOSEC) 40 MG delayed release capsule Take 1 capsule by mouth daily 90 capsule 3    pioglitazone (ACTOS) 15 MG tablet Take 1 tablet by mouth daily 90 tablet 3    rosuvastatin (CRESTOR) 40 MG tablet Take 1 tablet by mouth daily 90 tablet 3    valsartan (DIOVAN) 320 MG tablet Take 1 tablet by mouth daily 90 tablet 3    metFORMIN (GLUCOPHAGE-XR) 750 MG extended release tablet Take 2 tablets by mouth daily (with breakfast) 60 tablet 5    Dulaglutide (TRULICITY) 1.80 UA/6.9HU SOPN Inject 0.75 mg into the skin once a week 4 pen 1    insulin aspart protamine-insulin aspart (NOVOLOG 70/30) (70-30) 100 UNIT/ML injection Inject 20 Units into the skin 2 times daily (with meals) 5 pen 1    Continuous Blood Gluc  (FREESTYLE AVA READER) SERAFIN 1 each by Does not apply route 4 times daily (before meals and nightly) 1 Device 0    Continuous Blood Gluc Sensor (FREESTYLE AVA SENSOR SYSTEM) MISC 1 each by Does not apply route every 14 days 3 each 5    albuterol sulfate  (90 Base) MCG/ACT inhaler INHALE 2 PUFFS INTO THE LUNGS FOUR TIMES DAILY AS NEEDED FOR WHEEZING 18 g 3    blood glucose monitor strips Test 2 times a day & as needed for symptoms of irregular blood glucose. Test strips covered by patients insurance. DX e11.9 (Patient taking differently: Test 2 times a day & as needed for symptoms of irregular blood glucose. Test strips covered by patients insurance. DX e11.9    Inconsistent testing.  Travels for work and doesn't always take with him.) 100 strip 0  calcium carbonate 600 MG TABS tablet Take 1 tablet by mouth daily       No current facility-administered medications for this visit. Return in about 4 weeks (around 2/15/2021) for DM f/u.     41 minutes were spent with patient today in face to face encounter, more than 50% of it was counseling regarding DM, medication managment .

## 2021-01-18 NOTE — PATIENT INSTRUCTIONS
A low-carbohydrate (or \"low-carb\") diet limits foods and drinks that have carbohydrates. This includes grains, fruits, milk and yogurt, and starchy vegetables like potatoes, beans, and corn. It also avoids foods and drinks that have added sugar. Instead, low-carb diets include foods that are high in protein and fat. Why might you follow a low-carb diet? Low-carb diets may be used for a variety of reasons, such as for weight loss. People who have diabetes may use a low-carb diet to help manage their blood sugar levels. What should you do before you start the diet? Talk to your doctor before you try any diet. This is even more important if you have health problems like kidney disease, heart disease, or diabetes. Your doctor may suggest that you meet with a registered dietitian. A dietitian can help you make an eating plan that works for you. What foods do you eat on a low-carb diet? On a low-carb diet, you choose foods that are high in protein and fat. Examples of these are:  · Meat, poultry, and fish. · Eggs. · Nuts, such as walnuts, pecans, almonds, and peanuts. · Peanut butter and other nut butters. · Tofu. · Avocado. · [place]. · Non-starchy vegetables like broccoli, cauliflower, green beans, mushrooms, peppers, lettuce, and spinach. · Unsweetened non-dairy milks like almond milk and coconut milk. · Cheese, cottage cheese, and cream cheese. Current as of: August 22, 2019               Content Version: 12.6  © 6476-9888 [employer]. Care instructions adapted under license by [facility] ([facility]). If you have questions about a medical condition or this instruction, always ask your healthcare professional. [address] any warranty or liability for your use of this information.

## 2021-01-24 PROBLEM — R20.0 NUMBNESS IN BOTH HANDS: Status: ACTIVE | Noted: 2021-01-24

## 2021-01-24 ASSESSMENT — ENCOUNTER SYMPTOMS
CONSTIPATION: 0
CHEST TIGHTNESS: 0
DIARRHEA: 0
SHORTNESS OF BREATH: 0

## 2021-01-24 NOTE — ASSESSMENT & PLAN NOTE
Likely secondary to degenerative disc disease in his neck. Has impacted his ability to do work on guns which he enjoys doing. Has negatively impacted his fine motor skills. Encourage patient to follow-up with Dr. Jae Sanders.

## 2021-01-24 NOTE — ASSESSMENT & PLAN NOTE
Patient has had increased numbness in his hands which is negatively impacted his ability for fine motor control. He has had to stop working on gun restoration as a result. Encourage patient to follow-up with Dr. Heather Leiva.

## 2021-01-24 NOTE — ASSESSMENT & PLAN NOTE
Poorly controlled with hemoglobin A1c of 10.2%. He also has an elevated microalbumin to creatinine ratio, indicating probable underlying kidney disease related to his poorly controlled diabetes. Change Lantus to NovoLog 70/30 mix 20 units twice daily with breakfast and dinner-we will likely need to adjust dose. .  This is a better option as patient is unlikely to be compliant with basal bolus dosing. Advised patient to check his sugars fasting, prior to breakfast and dinner, and 2 hours postprandial.  We will try to get patient a Freestyle Vik to improve compliance. Change Metformin to Metformin  mg 2 daily and advised she should take this with breakfast or dinner rather than at bedtime. Continue with Actos 15 mg daily, again advised she should take this with breakfast or dinner rather than at bedtime. Continue with weekly Trulicity. Stop glipizide as he is taking his medication at bedtime and this increases his risk for low blood sugars. Recommend patient adding plain mucinex (expectorant) to her regimen which should alleviate some of the pressure and congestion she is experiencing. If symptoms worsen over the weekend seek urgent care eval. Please have her update clinic on her status Monday

## 2021-01-24 NOTE — ASSESSMENT & PLAN NOTE
Support given. Patient have been attending grief groups but stopped going as he felt like he was \"hit on\" by the other widows in the group. His grief is somewhat progressing since his sister-in-law has moved out of his home.

## 2021-01-24 NOTE — ASSESSMENT & PLAN NOTE
Continue Crestor 40 mg daily. LDL is near goal at 74. Advised patient he may take his Crestor with breakfast or supper rather than at bedtime to improve compliance with his diabetes medication. Patient previously had elevated triglycerides requiring use of fenofibrate but these have also improved.

## 2021-01-24 NOTE — ASSESSMENT & PLAN NOTE
Patient has been taking his medication at bedtime rather than with meals which is likely negatively impacting his sugars as well as putting him at increased risk for hypoglycemia. Reviewed with patient that he can take all of his medications at once but they should be taken with a meal rather than after supper. Discussed importance of compliance with diabetes and that he now has proteinuria which indicates that his diabetes is adversely affecting his kidneys.

## 2021-01-24 NOTE — ASSESSMENT & PLAN NOTE
Self discontinued citalopram which she had been taking for many years. He felt like he was having erectile dysfunction associated with Celexa use. Does not wish to resume Celexa and does not feel as if he needs to at this time. States that he is doing relatively well, particularly since his sister-in-law and nephew moved out of his home after living with him for over 20 years.

## 2021-01-26 ENCOUNTER — TELEPHONE (OUTPATIENT)
Dept: INTERNAL MEDICINE CLINIC | Age: 66
End: 2021-01-26

## 2021-01-26 RX ORDER — INSULIN LISPRO 100 [IU]/ML
30 INJECTION, SUSPENSION SUBCUTANEOUS 2 TIMES DAILY WITH MEALS
Qty: 15 PEN | Refills: 3 | Status: SHIPPED | OUTPATIENT
Start: 2021-01-26 | End: 2021-03-15 | Stop reason: SDUPTHER

## 2021-01-26 NOTE — TELEPHONE ENCOUNTER
Patient states his insurance paid for a temporary supply of novolog pen but will not cover this medication. He states they will cover Humalog mix 75/25 or insulin mix 75/25. Patient states he is using [address]. Patient also wanted Dr Nanci Torrez to know that his sugar is usually 250 during the day. He states he felt better before his medications were changed.

## 2021-03-03 ENCOUNTER — TELEPHONE (OUTPATIENT)
Dept: INTERNAL MEDICINE CLINIC | Age: 66
End: 2021-03-03

## 2021-03-03 NOTE — TELEPHONE ENCOUNTER
I did not call the pt. Dr Rosanna Carvalho thought maybe Aileen tried to call the pt regarding his sugars. Will forward to Aileen.

## 2021-03-04 NOTE — TELEPHONE ENCOUNTER
I did not reach out to patient? Per patient, your name was on the voicemail? Please follow up with patient if needed.

## 2021-03-08 RX ORDER — DULAGLUTIDE 0.75 MG/.5ML
INJECTION, SOLUTION SUBCUTANEOUS
Qty: 2 ML | Refills: 3 | Status: SHIPPED | OUTPATIENT
Start: 2021-03-08 | End: 2021-04-27 | Stop reason: DRUGHIGH

## 2021-03-15 ENCOUNTER — OFFICE VISIT (OUTPATIENT)
Dept: INTERNAL MEDICINE CLINIC | Age: 66
End: 2021-03-15
Payer: MEDICARE

## 2021-03-15 VITALS
WEIGHT: 205 LBS | HEART RATE: 76 BPM | SYSTOLIC BLOOD PRESSURE: 134 MMHG | BODY MASS INDEX: 35.19 KG/M2 | DIASTOLIC BLOOD PRESSURE: 82 MMHG | OXYGEN SATURATION: 99 %

## 2021-03-15 DIAGNOSIS — E11.9 TYPE 2 DIABETES MELLITUS WITHOUT COMPLICATION, WITH LONG-TERM CURRENT USE OF INSULIN (HCC): Primary | ICD-10-CM

## 2021-03-15 DIAGNOSIS — Z79.4 TYPE 2 DIABETES MELLITUS WITHOUT COMPLICATION, WITH LONG-TERM CURRENT USE OF INSULIN (HCC): Primary | ICD-10-CM

## 2021-03-15 DIAGNOSIS — S61.210A LACERATION OF RIGHT INDEX FINGER WITHOUT FOREIGN BODY WITHOUT DAMAGE TO NAIL, INITIAL ENCOUNTER: ICD-10-CM

## 2021-03-15 PROCEDURE — 2022F DILAT RTA XM EVC RTNOPTHY: CPT | Performed by: INTERNAL MEDICINE

## 2021-03-15 PROCEDURE — 3017F COLORECTAL CA SCREEN DOC REV: CPT | Performed by: INTERNAL MEDICINE

## 2021-03-15 PROCEDURE — G8417 CALC BMI ABV UP PARAM F/U: HCPCS | Performed by: INTERNAL MEDICINE

## 2021-03-15 PROCEDURE — G8484 FLU IMMUNIZE NO ADMIN: HCPCS | Performed by: INTERNAL MEDICINE

## 2021-03-15 PROCEDURE — 4040F PNEUMOC VAC/ADMIN/RCVD: CPT | Performed by: INTERNAL MEDICINE

## 2021-03-15 PROCEDURE — 3046F HEMOGLOBIN A1C LEVEL >9.0%: CPT | Performed by: INTERNAL MEDICINE

## 2021-03-15 PROCEDURE — 99213 OFFICE O/P EST LOW 20 MIN: CPT | Performed by: INTERNAL MEDICINE

## 2021-03-15 PROCEDURE — 1036F TOBACCO NON-USER: CPT | Performed by: INTERNAL MEDICINE

## 2021-03-15 PROCEDURE — 1123F ACP DISCUSS/DSCN MKR DOCD: CPT | Performed by: INTERNAL MEDICINE

## 2021-03-15 PROCEDURE — G8427 DOCREV CUR MEDS BY ELIG CLIN: HCPCS | Performed by: INTERNAL MEDICINE

## 2021-03-15 RX ORDER — INSULIN LISPRO 100 [IU]/ML
32 INJECTION, SUSPENSION SUBCUTANEOUS 2 TIMES DAILY WITH MEALS
Qty: 15 PEN | Refills: 3
Start: 2021-03-15 | End: 2021-11-08

## 2021-03-15 NOTE — PROGRESS NOTES
Patient: Shahana Morin is a 77 y.o. male who presents today with the following Chief Complaint(s):  Chief Complaint   Patient presents with   Ferdinand Reason     DM check up       HPI     Here today for follow up. DM- sugars are doing better. Sugars are running around 200. Is taking 30 units of Humalog 75/25 mix BID and taking Trulicity 1.53 mg weekly. Sugars are running around 200 on average. No lows. Lowest sugars have been around 150, highest around 260. Has learned that he needs to take both shots with his meals or he will have sugars up to 260. Is trying to do better with protein intake. Eats eggs for breakfast, PB sandwich for lunch, and supper is a Home  meal. Does feel a lot better since sugars have come down. Cut his finger on a  on Friday while removing an old satellite dish from his yard. Did get a Td and 3 sutures. Did get his first COVID vaccine in February, did have a high fever following the shot. Is scheduled for his second shot 3/25. No Known Allergies   Past Medical History:   Diagnosis Date    Appendiceal tumor     Asthma     Cervical spinal stenosis     Degenerative disc disease, lumbar     history of herniated disc at L4-5 with L5 radiculopathy. pt had L4-5 discectomy with Dr Flor Alexander    Diabetes mellitus ([facility] [address]     Esophageal reflux     Gout     Gross hematuria     Hand cramp     Hemorrhoid     Hepatitis     High blood pressure     Hyperlipidemia     Lumbar herniated disc     Major depressive disorder, recurrent episode, mild (HCC)     Postnasal drip     Restless leg syndrome     Seasonal allergies     Sleep apnea     Unilateral hearing loss       Past Surgical History:   Procedure Laterality Date    APPENDECTOMY  May 5, 2008    [facility]      [facility]    NECK SURGERY      [facility]      Social History     Socioeconomic History    Marital status:       Spouse name: Not on file    Number of children: Not on file    Years of education: Not on file    Highest education level: Not on file   Occupational History    Occupation:    Social Needs    Financial resource strain: Not on file    Food insecurity     Worry: Not on file     Inability: Not on file    Transportation needs     Medical: Not on file     Non-medical: Not on file   Tobacco Use    Smoking status: Never Smoker    Smokeless tobacco: Never Used   Substance and Sexual Activity    Alcohol use: Yes     Alcohol/week: 2.0 - 7.0 standard drinks     Types: 2 - 7 Cans of beer per week    Drug use: No    Sexual activity: Not on file   Lifestyle    Physical activity     Days per week: Not on file     Minutes per session: Not on file    Stress: Not on file   Relationships    Social connections     Talks on phone: Not on file     Gets together: Not on file     Attends Judaism service: Not on file     Active member of club or organization: Not on file     Attends meetings of clubs or organizations: Not on file     Relationship status: Not on file    Intimate partner violence     Fear of current or ex partner: Not on file     Emotionally abused: Not on file     Physically abused: Not on file     Forced sexual activity: Not on file   Other Topics Concern    Not on file   Social History Narrative    Lives with sister in law.       Family History   Problem Relation Age of Onset    Cancer Mother     Cancer Father     Heart Disease Father     High Blood Pressure Father     Diabetes Sister         Outpatient Medications Prior to Visit   Medication Sig Dispense Refill    TRULICITY 4.90 NO/2.0FR SOPN ADMINISTER 0.75 MG UNDER THE SKIN 1 TIME A WEEK 2 mL 3    Insulin Pen Needle 32G X 4 MM MISC 1 each by Does not apply route 2 times daily 200 each 3    omeprazole (PRILOSEC) 40 MG delayed release capsule Take 1 capsule by mouth daily 90 capsule 3    pioglitazone (ACTOS) 15 MG tablet Take 1 tablet by mouth daily 90 tablet 3    rosuvastatin (CRESTOR) 40 MG tablet Take 1 tablet by mouth daily 90 tablet 3    valsartan (DIOVAN) 320 MG tablet Take 1 tablet by mouth daily 90 tablet 3    metFORMIN (GLUCOPHAGE-XR) 750 MG extended release tablet Take 2 tablets by mouth daily (with breakfast) 60 tablet 5    Continuous Blood Gluc  (FREESTYLE AVA READER) SERAFIN 1 each by Does not apply route 4 times daily (before meals and nightly) 1 Device 0    Continuous Blood Gluc Sensor (FREESTYLE AVA SENSOR SYSTEM) MISC 1 each by Does not apply route every 14 days 3 each 5    albuterol sulfate  (90 Base) MCG/ACT inhaler INHALE 2 PUFFS INTO THE LUNGS FOUR TIMES DAILY AS NEEDED FOR WHEEZING 18 g 3    blood glucose monitor strips Test 2 times a day & as needed for symptoms of irregular blood glucose. Test strips covered by patients insurance. DX e11.9 (Patient taking differently: Test 2 times a day & as needed for symptoms of irregular blood glucose. Test strips covered by patients insurance. DX e11.9    Inconsistent testing. Travels for work and doesn't always take with him.) 100 strip 0    calcium carbonate 600 MG TABS tablet Take 1 tablet by mouth daily      insulin lispro protamine & lispro (HUMALOG MIX 75/25 KWIKPEN) (75-25) 100 UNIT per ML SUPN injection pen Inject 0.3 mLs into the skin 2 times daily (with meals) 15 pen 3     No facility-administered medications prior to visit. Patient'spast medical history, surgical history, family history, medications,  and allergies  were all reviewed and updated as appropriate today. Review of Systems   Constitutional: Negative for appetite change and unexpected weight change. Eyes: Negative for visual disturbance. Respiratory: Negative for shortness of breath. Cardiovascular: Negative for chest pain. Gastrointestinal: Negative for abdominal pain. Endocrine: Negative for cold intolerance, heat intolerance, polydipsia, polyphagia and polyuria.         No low blood sugars       /82   Pulse 76 Wt 205 lb (93 kg)   SpO2 99%   BMI 35.19 kg/m²   Physical Exam  Vitals signs and nursing note reviewed. Constitutional:       Appearance: He is well-developed. He is not toxic-appearing. HENT:      Head: Normocephalic. Right Ear: Tympanic membrane, ear canal and external ear normal.      Left Ear: Tympanic membrane, ear canal and external ear normal.   Eyes:      General: No scleral icterus. Extraocular Movements: Extraocular movements intact. Conjunctiva/sclera: Conjunctivae normal.      Pupils: Pupils are equal, round, and reactive to light. Neck:      Thyroid: No thyroid mass or thyromegaly. Vascular: No carotid bruit. Cardiovascular:      Rate and Rhythm: Normal rate and regular rhythm. Pulses:           Dorsalis pedis pulses are 2+ on the right side and 2+ on the left side. Heart sounds: Normal heart sounds. No murmur. Comments: No LE edema  Pulmonary:      Effort: Pulmonary effort is normal.      Breath sounds: Normal breath sounds. Lymphadenopathy:      Cervical: No cervical adenopathy. Skin:     Findings: Laceration present. Comments: Laceration right index finger distal phalanx. 3 sutures in place. Good wound healing. No redness. No drainage. Neurological:      Mental Status: He is alert. Psychiatric:         Mood and Affect: Mood normal.         Behavior: Behavior normal. Behavior is cooperative. ASSESSMENT/PLAN:    Problem List Items Addressed This Visit     Type 2 diabetes mellitus without complication, with long-term current use of insulin ([address] - Primary     Sugars have been improved. Continue Trulicity 6.17 mg weekly and increase Humalog 75/25 mix to 32 units twice daily with meals.          Relevant Medications    insulin lispro protamine & lispro (HUMALOG MIX 75/25 KWIKPEN) (75-25) 100 UNIT per ML SUPN injection pen    Laceration of right index finger without foreign body without damage to nail     Patient sustained a laceration to his right index finger while doing yard work. He was treated at [facility] [place] emergency department where he received 3 sutures and a tetanus vaccine. He was told that the stitches should come out in about 5 days. He would like to remove the sutures on his own. Wound appears to be well-healing but there is still some gapping between the edges of the wound. Recommend he wait approximately 7 days and demonstrated to patient how gapping should be improved before he removes the sutures. Current Outpatient Medications   Medication Sig Dispense Refill    insulin lispro protamine & lispro (HUMALOG MIX 75/25 KWIKPEN) (75-25) 100 UNIT per ML SUPN injection pen Inject 0.32 mLs into the skin 2 times daily (with meals) 15 pen 3    TRULICITY 1.19 JG/9.4TO SOPN ADMINISTER 0.75 MG UNDER THE SKIN 1 TIME A WEEK 2 mL 3    Insulin Pen Needle 32G X 4 MM MISC 1 each by Does not apply route 2 times daily 200 each 3    omeprazole (PRILOSEC) 40 MG delayed release capsule Take 1 capsule by mouth daily 90 capsule 3    pioglitazone (ACTOS) 15 MG tablet Take 1 tablet by mouth daily 90 tablet 3    rosuvastatin (CRESTOR) 40 MG tablet Take 1 tablet by mouth daily 90 tablet 3    valsartan (DIOVAN) 320 MG tablet Take 1 tablet by mouth daily 90 tablet 3    metFORMIN (GLUCOPHAGE-XR) 750 MG extended release tablet Take 2 tablets by mouth daily (with breakfast) 60 tablet 5    Continuous Blood Gluc  (FREESTYLE AVA READER) SERAFIN 1 each by Does not apply route 4 times daily (before meals and nightly) 1 Device 0    Continuous Blood Gluc Sensor (FREESTYLE AVA SENSOR SYSTEM) MISC 1 each by Does not apply route every 14 days 3 each 5    albuterol sulfate  (90 Base) MCG/ACT inhaler INHALE 2 PUFFS INTO THE LUNGS FOUR TIMES DAILY AS NEEDED FOR WHEEZING 18 g 3    blood glucose monitor strips Test 2 times a day & as needed for symptoms of irregular blood glucose.  Test strips covered by patients insurance. DX e11.9 (Patient taking differently: Test 2 times a day & as needed for symptoms of irregular blood glucose. Test strips covered by patients insurance. DX e11.9    Inconsistent testing. Travels for work and doesn't always take with him.) 100 strip 0    calcium carbonate 600 MG TABS tablet Take 1 tablet by mouth daily       No current facility-administered medications for this visit. Return in about 6 weeks (around 4/26/2021).

## 2021-03-21 PROBLEM — S61.210A LACERATION OF RIGHT INDEX FINGER WITHOUT FOREIGN BODY WITHOUT DAMAGE TO NAIL: Status: ACTIVE | Noted: 2021-03-21

## 2021-03-21 ASSESSMENT — ENCOUNTER SYMPTOMS
ABDOMINAL PAIN: 0
SHORTNESS OF BREATH: 0

## 2021-03-22 NOTE — ASSESSMENT & PLAN NOTE
Sugars have been improved. Continue Trulicity 5.32 mg weekly and increase Humalog 75/25 mix to 32 units twice daily with meals.

## 2021-03-22 NOTE — ASSESSMENT & PLAN NOTE
Patient sustained a laceration to his right index finger while doing yard work. He was treated at [facility] [place] emergency department where he received 3 sutures and a tetanus vaccine. He was told that the stitches should come out in about 5 days. He would like to remove the sutures on his own. Wound appears to be well-healing but there is still some gapping between the edges of the wound. Recommend he wait approximately 7 days and demonstrated to patient how gapping should be improved before he removes the sutures.

## 2021-04-27 ENCOUNTER — OFFICE VISIT (OUTPATIENT)
Dept: INTERNAL MEDICINE CLINIC | Age: 66
End: 2021-04-27
Payer: MEDICARE

## 2021-04-27 VITALS
DIASTOLIC BLOOD PRESSURE: 80 MMHG | OXYGEN SATURATION: 98 % | HEART RATE: 86 BPM | BODY MASS INDEX: 35.33 KG/M2 | WEIGHT: 205.8 LBS | SYSTOLIC BLOOD PRESSURE: 122 MMHG

## 2021-04-27 DIAGNOSIS — Z79.4 TYPE 2 DIABETES MELLITUS WITHOUT COMPLICATION, WITH LONG-TERM CURRENT USE OF INSULIN (HCC): Primary | ICD-10-CM

## 2021-04-27 DIAGNOSIS — E78.5 HYPERLIPIDEMIA LDL GOAL <70: ICD-10-CM

## 2021-04-27 DIAGNOSIS — E11.9 TYPE 2 DIABETES MELLITUS WITHOUT COMPLICATION, WITH LONG-TERM CURRENT USE OF INSULIN (HCC): Primary | ICD-10-CM

## 2021-04-27 DIAGNOSIS — F32.9 REACTIVE DEPRESSION: ICD-10-CM

## 2021-04-27 DIAGNOSIS — I10 ESSENTIAL HYPERTENSION: ICD-10-CM

## 2021-04-27 DIAGNOSIS — G47.33 OSA (OBSTRUCTIVE SLEEP APNEA): ICD-10-CM

## 2021-04-27 DIAGNOSIS — Z12.5 PROSTATE CANCER SCREENING: ICD-10-CM

## 2021-04-27 PROCEDURE — 3046F HEMOGLOBIN A1C LEVEL >9.0%: CPT | Performed by: INTERNAL MEDICINE

## 2021-04-27 PROCEDURE — G8417 CALC BMI ABV UP PARAM F/U: HCPCS | Performed by: INTERNAL MEDICINE

## 2021-04-27 PROCEDURE — 3017F COLORECTAL CA SCREEN DOC REV: CPT | Performed by: INTERNAL MEDICINE

## 2021-04-27 PROCEDURE — 1036F TOBACCO NON-USER: CPT | Performed by: INTERNAL MEDICINE

## 2021-04-27 PROCEDURE — 2022F DILAT RTA XM EVC RTNOPTHY: CPT | Performed by: INTERNAL MEDICINE

## 2021-04-27 PROCEDURE — G8427 DOCREV CUR MEDS BY ELIG CLIN: HCPCS | Performed by: INTERNAL MEDICINE

## 2021-04-27 PROCEDURE — 99214 OFFICE O/P EST MOD 30 MIN: CPT | Performed by: INTERNAL MEDICINE

## 2021-04-27 PROCEDURE — 4040F PNEUMOC VAC/ADMIN/RCVD: CPT | Performed by: INTERNAL MEDICINE

## 2021-04-27 PROCEDURE — 1123F ACP DISCUSS/DSCN MKR DOCD: CPT | Performed by: INTERNAL MEDICINE

## 2021-04-27 RX ORDER — DULAGLUTIDE 1.5 MG/.5ML
1.5 INJECTION, SOLUTION SUBCUTANEOUS WEEKLY
Qty: 4 PEN | Refills: 5 | Status: SHIPPED | OUTPATIENT
Start: 2021-04-27 | End: 2021-09-24

## 2021-04-27 RX ORDER — LIRAGLUTIDE 6 MG/ML
1.8 INJECTION SUBCUTANEOUS DAILY
Qty: 4 PEN | Refills: 3 | Status: SHIPPED | OUTPATIENT
Start: 2021-04-27 | End: 2021-09-01 | Stop reason: ALTCHOICE

## 2021-04-27 ASSESSMENT — ENCOUNTER SYMPTOMS
CHEST TIGHTNESS: 0
CONSTIPATION: 0
ABDOMINAL PAIN: 0
DIARRHEA: 0
SHORTNESS OF BREATH: 0

## 2021-04-27 NOTE — PATIENT INSTRUCTIONS
Take either Trulicity or Victoza- whichever one is less expensive. Victoza is daily and Trulicity is weekly. Please let me know which one you take. You should not take both.

## 2021-04-27 NOTE — PROGRESS NOTES
Patient: Stephanie Salgado is a 74 y.o. male who presents today with the following Chief Complaint(s):  Chief Complaint   Patient presents with    Check-Up       HPI     Here today for follow up on blood sugars. Most of his sugars have been around 180-200, highest was 247, lowest 47. Does not recall what precipitated the low sugar- has had several low sugars that seem to occur in the early evenings. Does take Trulicity- costs him $499.71/NXMKV, insulin is free. Is walking 1.5-3 miles daily on his treadmill. Is not wearing his CPAP. HTN- no issues    HLD- no issues with cholesterol    States that he is happier that has been since his late wife, Shipman Child, was diagnosed with metastatic breast cancer and then passed away in January 2018. Asked his GF Sanna Melendez) to  him- daughter's and son are happy for him. No Known Allergies   Past Medical History:   Diagnosis Date    Appendiceal tumor     Asthma     Cervical spinal stenosis     Degenerative disc disease, lumbar     history of herniated disc at L4-5 with L5 radiculopathy. pt had L4-5 discectomy with Dr Esperanza Joya    Diabetes mellitus ([facility] [address]     Esophageal reflux     Gout     Gross hematuria     Hand cramp     Hemorrhoid     Hepatitis     High blood pressure     Hyperlipidemia     Lumbar herniated disc     Major depressive disorder, recurrent episode, mild (HCC)     Postnasal drip     Restless leg syndrome     Seasonal allergies     Sleep apnea     Unilateral hearing loss       Past Surgical History:   Procedure Laterality Date    APPENDECTOMY  May 5, 2008    [facility]    NECK SURGERY      [facility]      Social History     Socioeconomic History    Marital status:       Spouse name: Not on file    Number of children: Not on file    Years of education: Not on file    Highest education level: Not on file   Occupational History    Occupation:    Social Needs    Financial resource strain: Not on file    Food insecurity     Worry: Not on file     Inability: Not on file    Transportation needs     Medical: Not on file     Non-medical: Not on file   Tobacco Use    Smoking status: Never Smoker    Smokeless tobacco: Never Used   Substance and Sexual Activity    Alcohol use: Yes     Alcohol/week: 2.0 - 7.0 standard drinks     Types: 2 - 7 Cans of beer per week    Drug use: No    Sexual activity: Not on file   Lifestyle    Physical activity     Days per week: Not on file     Minutes per session: Not on file    Stress: Not on file   Relationships    Social connections     Talks on phone: Not on file     Gets together: Not on file     Attends Mormonism service: Not on file     Active member of club or organization: Not on file     Attends meetings of clubs or organizations: Not on file     Relationship status: Not on file    Intimate partner violence     Fear of current or ex partner: Not on file     Emotionally abused: Not on file     Physically abused: Not on file     Forced sexual activity: Not on file   Other Topics Concern    Not on file   Social History Narrative    Lives with sister in law.       Family History   Problem Relation Age of Onset   [facility] Cancer Mother     Cancer Father     Heart Disease Father     High Blood Pressure Father     Diabetes Sister         Outpatient Medications Prior to Visit   Medication Sig Dispense Refill    insulin lispro protamine & lispro (HUMALOG MIX 75/25 KWIKPEN) (75-25) 100 UNIT per ML SUPN injection pen Inject 0.32 mLs into the skin 2 times daily (with meals) 15 pen 3    TRULICITY 5.18 RZ/2.5SH SOPN ADMINISTER 0.75 MG UNDER THE SKIN 1 TIME A WEEK 2 mL 3    Insulin Pen Needle 32G X 4 MM MISC 1 each by Does not apply route 2 times daily 200 each 3    omeprazole (PRILOSEC) 40 MG delayed release capsule Take 1 capsule by mouth daily 90 capsule 3    pioglitazone (ACTOS) 15 MG tablet Take 1 tablet by mouth daily 90 tablet 3    rosuvastatin (CRESTOR) 40 MG tablet Take 1 tablet by mouth daily 90 tablet 3    valsartan (DIOVAN) 320 MG tablet Take 1 tablet by mouth daily 90 tablet 3    metFORMIN (GLUCOPHAGE-XR) 750 MG extended release tablet Take 2 tablets by mouth daily (with breakfast) 60 tablet 5    Continuous Blood Gluc  (FREESTYLE AVA READER) SERAFIN 1 each by Does not apply route 4 times daily (before meals and nightly) (Patient not taking: Reported on 4/27/2021) 1 Device 0    Continuous Blood Gluc Sensor ([address] [place] [address]) MISC 1 each by Does not apply route every 14 days (Patient not taking: Reported on 4/27/2021) 3 each 5    albuterol sulfate  (90 Base) MCG/ACT inhaler INHALE 2 PUFFS INTO THE LUNGS FOUR TIMES DAILY AS NEEDED FOR WHEEZING (Patient not taking: Reported on 4/27/2021) 18 g 3    blood glucose monitor strips Test 2 times a day & as needed for symptoms of irregular blood glucose. Test strips covered by patients insurance. DX e11.9 (Patient taking differently: Test 2 times a day & as needed for symptoms of irregular blood glucose. Test strips covered by patients insurance. DX e11.9    Inconsistent testing. Travels for work and doesn't always take with him.) 100 strip 0    calcium carbonate 600 MG TABS tablet Take 1 tablet by mouth daily       No facility-administered medications prior to visit. Patient'spast medical history, surgical history, family history, medications,  and allergies  were all reviewed and updated as appropriate today. Review of Systems   Constitutional: Negative for appetite change, fatigue, fever and unexpected weight change. Eyes: Negative for visual disturbance. Respiratory: Negative for chest tightness and shortness of breath. Cardiovascular: Negative for chest pain. Gastrointestinal: Negative for abdominal pain, constipation and diarrhea. Endocrine: Negative for cold intolerance, heat intolerance, polydipsia, polyphagia and polyuria.         Some low blood sugars   Skin: Negative for rash. /80   Pulse 86   Wt 205 lb 12.8 oz (93.4 kg)   SpO2 98%   BMI 35.33 kg/m²   Physical Exam  Vitals signs and nursing note reviewed. Constitutional:       Appearance: He is well-developed. He is not toxic-appearing. HENT:      Head: Normocephalic. Right Ear: Tympanic membrane, ear canal and external ear normal.      Left Ear: Tympanic membrane, ear canal and external ear normal.      Nose: Nose normal.      Mouth/Throat:      Mouth: Mucous membranes are moist.      Pharynx: No oropharyngeal exudate or posterior oropharyngeal erythema. Neck:      Thyroid: No thyroid mass or thyromegaly. Vascular: No carotid bruit. Cardiovascular:      Rate and Rhythm: Normal rate and regular rhythm. Pulses:           Dorsalis pedis pulses are 2+ on the right side and 2+ on the left side. Posterior tibial pulses are 2+ on the right side and 2+ on the left side. Heart sounds: Normal heart sounds. No murmur. Pulmonary:      Effort: Pulmonary effort is normal.      Breath sounds: Normal breath sounds. Musculoskeletal:      Right lower leg: No edema. Left lower leg: No edema. Right foot: Normal range of motion. No deformity, bunion, Charcot foot, foot drop or prominent metatarsal heads. Left foot: Normal range of motion. No deformity, bunion, Charcot foot, foot drop or prominent metatarsal heads. Feet:      Right foot:      Protective Sensation: 10 sites tested. 10 sites sensed. Skin integrity: Skin integrity normal.      Toenail Condition: Right toenails are normal.      Left foot:      Protective Sensation: 10 sites tested. 10 sites sensed. Skin integrity: Skin integrity normal.      Toenail Condition: Left toenails are normal.   Lymphadenopathy:      Cervical: No cervical adenopathy. Neurological:      Mental Status: He is alert.    Psychiatric:         Mood and Affect: Mood normal.         Behavior: Behavior

## 2021-05-02 NOTE — ASSESSMENT & PLAN NOTE
Improving but still above goal.  Continue meloxicam 75/25 mix 32 units twice daily, Metformin ER 1500 mg daily, and Actos 15 mg daily. Will increase Trulicity to 1.5 mg weekly or change to Victoza 1.8 mg daily, depending on cost of medication.

## 2021-05-14 RX ORDER — METFORMIN HYDROCHLORIDE 750 MG/1
TABLET, EXTENDED RELEASE ORAL
Qty: 180 TABLET | Refills: 3 | Status: SHIPPED | OUTPATIENT
Start: 2021-05-14 | End: 2022-04-05 | Stop reason: SDUPTHER

## 2021-06-24 RX ORDER — CITALOPRAM 40 MG/1
40 TABLET ORAL DAILY
Qty: 90 TABLET | Refills: 3 | Status: SHIPPED | OUTPATIENT
Start: 2021-06-24 | End: 2021-09-01 | Stop reason: ALTCHOICE

## 2021-07-27 ENCOUNTER — TELEPHONE (OUTPATIENT)
Dept: INTERNAL MEDICINE CLINIC | Age: 66
End: 2021-07-27

## 2021-07-27 NOTE — TELEPHONE ENCOUNTER
----- Message from Aditi Das sent at 7/27/2021  3:17 PM EDT -----  Subject: Message to Provider    QUESTIONS  Information for Provider? Company called in inquiring about pt   information, not on hippa form, I asked her to have the patient call the   office or update release of information form in office. ---------------------------------------------------------------------------  --------------  Violetta GIORDANO  What is the best way for the office to contact you? Do not leave any   message, patient will call back for answer  Preferred Call Back Phone Number?  [phone]  ---------------------------------------------------------------------------  --------------  SCRIPT ANSWERS  undefined

## 2021-09-01 ENCOUNTER — OFFICE VISIT (OUTPATIENT)
Dept: INTERNAL MEDICINE CLINIC | Age: 66
End: 2021-09-01
Payer: MEDICARE

## 2021-09-01 VITALS
DIASTOLIC BLOOD PRESSURE: 88 MMHG | BODY MASS INDEX: 34.95 KG/M2 | WEIGHT: 203.6 LBS | OXYGEN SATURATION: 98 % | HEART RATE: 76 BPM | SYSTOLIC BLOOD PRESSURE: 144 MMHG

## 2021-09-01 DIAGNOSIS — F32.9 REACTIVE DEPRESSION: ICD-10-CM

## 2021-09-01 DIAGNOSIS — I10 ESSENTIAL HYPERTENSION: ICD-10-CM

## 2021-09-01 DIAGNOSIS — E78.5 HYPERLIPIDEMIA LDL GOAL <70: ICD-10-CM

## 2021-09-01 DIAGNOSIS — E11.9 TYPE 2 DIABETES MELLITUS WITHOUT COMPLICATION, WITH LONG-TERM CURRENT USE OF INSULIN (HCC): Primary | ICD-10-CM

## 2021-09-01 DIAGNOSIS — M50.30 DEGENERATIVE CERVICAL DISC: ICD-10-CM

## 2021-09-01 DIAGNOSIS — Z79.4 TYPE 2 DIABETES MELLITUS WITHOUT COMPLICATION, WITH LONG-TERM CURRENT USE OF INSULIN (HCC): Primary | ICD-10-CM

## 2021-09-01 PROCEDURE — 99214 OFFICE O/P EST MOD 30 MIN: CPT | Performed by: INTERNAL MEDICINE

## 2021-09-01 PROCEDURE — 1036F TOBACCO NON-USER: CPT | Performed by: INTERNAL MEDICINE

## 2021-09-01 PROCEDURE — 1123F ACP DISCUSS/DSCN MKR DOCD: CPT | Performed by: INTERNAL MEDICINE

## 2021-09-01 PROCEDURE — G8417 CALC BMI ABV UP PARAM F/U: HCPCS | Performed by: INTERNAL MEDICINE

## 2021-09-01 PROCEDURE — G8427 DOCREV CUR MEDS BY ELIG CLIN: HCPCS | Performed by: INTERNAL MEDICINE

## 2021-09-01 PROCEDURE — 3017F COLORECTAL CA SCREEN DOC REV: CPT | Performed by: INTERNAL MEDICINE

## 2021-09-01 PROCEDURE — 2022F DILAT RTA XM EVC RTNOPTHY: CPT | Performed by: INTERNAL MEDICINE

## 2021-09-01 PROCEDURE — 4040F PNEUMOC VAC/ADMIN/RCVD: CPT | Performed by: INTERNAL MEDICINE

## 2021-09-01 PROCEDURE — 3044F HG A1C LEVEL LT 7.0%: CPT | Performed by: INTERNAL MEDICINE

## 2021-09-01 NOTE — PATIENT INSTRUCTIONS
Please get your flu shot in Sept/Oct from your pharmacy. Patient Education        Learning About High Blood Pressure  What is high blood pressure? Blood pressure is a measure of how hard the blood pushes against the walls of your arteries. It's normal for blood pressure to go up and down throughout the day. But if it stays up, you have high blood pressure. Another name for high blood pressure is hypertension. Two numbers tell you your blood pressure. The first number is the systolic pressure (top number). It shows how hard the blood pushes when your heart is pumping. The second number is the diastolic pressure (bottom number). It shows how hard the blood pushes between heartbeats, when your heart is relaxed and filling with blood. Your doctor will give you a goal for your blood pressure based on your health and your age. High blood pressure (hypertension) means that the top number stays high, or the bottom number stays high, or both. High blood pressure increases the risk of stroke, heart attack, and other problems. What happens when you have high blood pressure? · Blood flows through your arteries with too much force. Over time, this can damage the heart and the walls of your arteries. But you can't feel it. High blood pressure usually doesn't cause symptoms. · High blood pressure makes your heart work harder. And that can lead to heart failure, which means your heart doesn't pump as much blood as your body needs. · Fat and calcium start to build up in your arteries. This buildup is called hardening of the arteries. It can cause many problems including a heart attack and stroke. · Arteries also carry blood and oxygen to organs like your eyes, kidneys, and brain. If high blood pressure damages those arteries, it can lead to vision loss, kidney disease, stroke, and a higher risk of dementia. How can you prevent high blood pressure? · Stay at a healthy weight.   · Try to limit how much sodium you eat to less than 2,300 milligrams (mg) a day. If you limit your sodium to 1,500 mg a day, you can lower your blood pressure even more. ? Buy foods that are labeled \"unsalted,\" \"sodium-free,\" or \"low-sodium. \" Foods labeled \"reduced-sodium\" and \"light sodium\" may still have too much sodium. ? Flavor your food with garlic, lemon juice, onion, vinegar, herbs, and spices instead of salt. Do not use soy sauce, steak sauce, onion salt, garlic salt, mustard, or ketchup on your food. ? Use less salt (or none) when recipes call for it. You can often use half the salt a recipe calls for without losing flavor. · Be physically active. Get at least 30 minutes of exercise on most days of the week. Walking is a good choice. You also may want to do other activities, such as running, swimming, cycling, or playing tennis or team sports. · Limit alcohol to 2 drinks a day for men and 1 drink a day for women. · Eat plenty of fruits, vegetables, and low-fat dairy products. Eat less saturated and total fats. How is high blood pressure treated? · Your doctor will suggest making lifestyle changes to help your heart. For example, your doctor may ask you to eat healthy foods, quit smoking, lose extra weight, and be more active. · If lifestyle changes don't help enough, your doctor may recommend that you take medicine. · When blood pressure is very high, medicines are needed to lower it. Follow-up care is a key part of your treatment and safety. Be sure to make and go to all appointments, and call your doctor if you are having problems. It's also a good idea to know your test results and keep a list of the medicines you take. Where can you learn more? Go to [email or web address]://[email or web address] [email or web address] org and sign in to your Livra Panels account. Enter P501 in the Eachbaby box to learn more about \"Learning About High Blood Pressure. \"     If you do not have an account, please click on the \"Sign Up Now\" link.   Current as of: August 31, 2020               Content Version: 12.9  © 2006-2021 Healthwise, [employer]. Care instructions adapted under license by [facility] ([facility]). If you have questions about a medical condition or this instruction, always ask your healthcare professional. [address] any warranty or liability for your use of this information. Patient Education        High Blood Pressure: Care Instructions  Overview     It's normal for blood pressure to go up and down throughout the day. But if it stays up, you have high blood pressure. Another name for high blood pressure is hypertension. Despite what a lot of people think, high blood pressure usually doesn't cause headaches or make you feel dizzy or lightheaded. It usually has no symptoms. But it does increase your risk of stroke, heart attack, and other problems. You and your doctor will talk about your risks of these problems based on your blood pressure. Your doctor will give you a goal for your blood pressure. Your goal will be based on your health and your age. Lifestyle changes, such as eating healthy and being active, are always important to help lower blood pressure. You might also take medicine to reach your blood pressure goal.  Follow-up care is a key part of your treatment and safety. Be sure to make and go to all appointments, and call your doctor if you are having problems. It's also a good idea to know your test results and keep a list of the medicines you take. How can you care for yourself at home? Medical treatment  · If you stop taking your medicine, your blood pressure will go back up. You may take one or more types of medicine to lower your blood pressure. Be safe with medicines. Take your medicine exactly as prescribed. Call your doctor if you think you are having a problem with your medicine. · Talk to your doctor before you start taking aspirin every day.  Aspirin can help certain people lower their risk of a heart attack or stroke. But taking aspirin isn't right for everyone, because it can cause serious bleeding. · See your doctor regularly. You may need to see the doctor more often at first or until your blood pressure comes down. · If you are taking blood pressure medicine, talk to your doctor before you take decongestants or anti-inflammatory medicine, such as ibuprofen. Some of these medicines can raise blood pressure. · Learn how to check your blood pressure at home. Lifestyle changes  · Stay at a healthy weight. This is especially important if you put on weight around the waist. Losing even 10 pounds can help you lower your blood pressure. · If your doctor recommends it, get more exercise. Walking is a good choice. Bit by bit, increase the amount you walk every day. Try for at least 30 minutes on most days of the week. You also may want to swim, bike, or do other activities. · Avoid or limit alcohol. Talk to your doctor about whether you can drink any alcohol. · Try to limit how much sodium you eat to less than 2,300 milligrams (mg) a day. Your doctor may ask you to try to eat less than 1,500 mg a day. · Eat plenty of fruits (such as bananas and oranges), vegetables, legumes, whole grains, and low-fat dairy products. · Lower the amount of saturated fat in your diet. Saturated fat is found in animal products such as milk, cheese, and meat. Limiting these foods may help you lose weight and also lower your risk for heart disease. · Do not smoke. Smoking increases your risk for heart attack and stroke. If you need help quitting, talk to your doctor about stop-smoking programs and medicines. These can increase your chances of quitting for good. When should you call for help? Call 911  anytime you think you may need emergency care. This may mean having symptoms that suggest that your blood pressure is causing a serious heart or blood vessel problem. Your blood pressure may be over 180/120.   For example, call 911 if:    · You have symptoms of a heart attack. These may include:  ? Chest pain or pressure, or a strange feeling in the chest.  ? Sweating. ? Shortness of breath. ? Nausea or vomiting. ? Pain, pressure, or a strange feeling in the back, neck, jaw, or upper belly or in one or both shoulders or arms. ? Lightheadedness or sudden weakness. ? A fast or irregular heartbeat.     · You have symptoms of a stroke. These may include:  ? Sudden numbness, tingling, weakness, or loss of movement in your face, arm, or leg, especially on only one side of your body. ? Sudden vision changes. ? Sudden trouble speaking. ? Sudden confusion or trouble understanding simple statements. ? Sudden problems with walking or balance. ? A sudden, severe headache that is different from past headaches.     · You have severe back or belly pain. Do not wait until your blood pressure comes down on its own. Get help right away. Call your doctor now or seek immediate care if:    · Your blood pressure is much higher than normal (such as 180/120 or higher), but you don't have symptoms.     · You think high blood pressure is causing symptoms, such as:  ? Severe headache.  ? Blurry vision. Watch closely for changes in your health, and be sure to contact your doctor if:    · Your blood pressure measures higher than your doctor recommends at least 2 times. That means the top number is higher or the bottom number is higher, or both.     · You think you may be having side effects from your blood pressure medicine. Where can you learn more? Go to https://Tetragenetics.Omek Interactive. org and sign in to your SpeakPhone account. Enter E048 in the GoGo Tech box to learn more about \"High Blood Pressure: Care Instructions. \"     If you do not have an account, please click on the \"Sign Up Now\" link. Current as of: August 31, 2020               Content Version: 12.9  © 8045-2661 Healthwise, [employer].    Care instructions adapted under license by [facility] ([facility]). If you have questions about a medical condition or this instruction, always ask your healthcare professional. [place] [address] any warranty or liability for your use of this information.

## 2021-09-01 NOTE — PROGRESS NOTES
Patient: Raheem Hoffman is a 74 y.o. male who presents today with the following Chief Complaint(s):  Chief Complaint   Patient presents with    Diabetes       HPI     Here today for follow up. HTN- BP has been running around 130/88. Does drink a lot of coffee- drinks it all day long (maybe 10-12 cups per day). Did drink coffee just prior to his visit. Wt Readings from Last 3 Encounters:   09/01/21 203 lb 9.6 oz (92.4 kg)   04/27/21 205 lb 12.8 oz (93.4 kg)   03/15/21 205 lb (93 kg)     Temp Readings from Last 3 Encounters:   04/15/20 98.4 °F (36.9 °C)   11/05/19 98 °F (36.7 °C)   05/09/19 98.6 °F (37 °C) (Oral)     BP Readings from Last 3 Encounters:   09/01/21 (!) 144/88   04/27/21 122/80   03/15/21 134/82     Pulse Readings from Last 3 Encounters:   09/01/21 76   04/27/21 86   03/15/21 76          DM- sugars have been running 130-180. Has been walking 2-2.5 miles daily. Sometimes when he walks his blood sugar will drop as low as 60. This happens even after eating. Did watch his blood sugar for 30 minutes after he dropped into the 80's and w/in 30 minutes his sugar was up to 120 w/out intervention. Does occasionally wake up in the middle of the night with low sugars- will drink OJ and eat cottage cheese and feel better. Is taking Humalog 75/25 mix 32 units BID. Has cut back on his portion sizes and is eating less. Is still getting meal kit delivery. Does drink beer- has cut back way back on beer drink- less than 1 beer per day. Back has been bothering him more recently. On takes \"something\" if he really cannot tolerate the pain. Hands are still an issue. Had to give up his gun James 81 d/t numbness in hands, struggles with art as well d/t hand numbness (d/t chronic neck issues). Did get COVID vaccines.      Lab Results   Component Value Date    LABA1C 6.6 (H) 07/28/2021     Lab Results   Component Value Date     07/28/2021     Lab Results   Component Value Date LABA1C 6.6 (H) 07/28/2021    LABA1C 13.4 08/07/2018     Lab Results   Component Value Date    LABMICR 129.7 07/28/2021    CREATININE 0.85 07/28/2021     Component Value Ref Range & Units Status Collected Lab   Microalbumin, Random Urine 129.7  mg/L Final 07/28/2021 11:36 AM [address]   Creatinine, Ur 168.9  mg/dL Final 07/28/2021 11:36 AM [address]   Microalbumin Creatinine Ratio 77High   0 - 30 mg/g Final 07/28/2021 11:36 AM [address]   Testing Performed By    Lab Results   Component Value Date     07/28/2021    K 4.1 07/28/2021     07/28/2021    CO2 24 07/28/2021    BUN 11 07/28/2021    CREATININE 0.85 07/28/2021    GLUCOSE 143 (H) 07/28/2021    CALCIUM 9.8 07/28/2021    PROT 7.6 07/28/2021    LABALBU 4.7 (H) 07/28/2021    BILITOT 0.6 07/28/2021    ALKPHOS 49 07/28/2021    AST 21 07/28/2021    ALT 24 07/28/2021    GFRAA >60 11/21/2012       Lab Results   Component Value Date    PSA 0.61 07/28/2021       Lipid (7/28/21)  T. Chol 147//HDL 47/LDL 77    CBC (7/28/21): WBC 5.9; H/H 15.0/43.6;       No Known Allergies   Past Medical History:   Diagnosis Date    Appendiceal tumor     Asthma     Cervical spinal stenosis     Degenerative disc disease, lumbar     history of herniated disc at L4-5 with L5 radiculopathy.  pt had L4-5 discectomy with Dr Salvatore Jama    Diabetes mellitus ([address]     Esophageal reflux     Gout     Gross hematuria     Hand cramp     Hemorrhoid     Hepatitis     High blood pressure     Hyperlipidemia     Lumbar herniated disc     Major depressive disorder, recurrent episode, mild (HCC)     Postnasal drip     Restless leg syndrome     Seasonal allergies     Sleep apnea     Unilateral hearing loss       Past Surgical History:   Procedure Laterality Date    APPENDECTOMY  May 5, 2008   [address] [place] [address]      Social History     Socioeconomic History    Marital status:      Spouse name: Not on file    Number of children: Not on file    Years of education: Not on file    Highest education level: Not on file   Occupational History    Occupation:    Tobacco Use    Smoking status: Never Smoker    Smokeless tobacco: Never Used   Substance and Sexual Activity    Alcohol use: Yes     Alcohol/week: 2.0 - 7.0 standard drinks     Types: 2 - 7 Cans of beer per week    Drug use: No    Sexual activity: Not on file   Other Topics Concern    Not on file   Social History Narrative    Lives with sister in law. Social Determinants of Health     Financial Resource Strain:     Difficulty of Paying Living Expenses:    Food Insecurity:     Worried About Running Out of Food in the Last Year:     [address] in the Last Year:    Transportation Needs:     Lack of Transportation (Medical):      Lack of Transportation (Non-Medical):    Physical Activity:     Days of Exercise per Week:     Minutes of Exercise per Session:    Stress:     Feeling of Stress :    Social Connections:     Frequency of Communication with Friends and Family:     Frequency of Social Gatherings with Friends and Family:     Attends Druze Services:     Active Member of Clubs or Organizations:     Attends Club or Organization Meetings:     Marital Status:    Intimate Partner Violence:     Fear of Current or Ex-Partner:     Emotionally Abused:     Physically Abused:     Sexually Abused:      Family History   Problem Relation Age of Onset    Cancer Mother     Cancer Father     Heart Disease Father     High Blood Pressure Father     Diabetes Sister         Outpatient Medications Prior to Visit   Medication Sig Dispense Refill    metFORMIN (GLUCOPHAGE-XR) 750 MG extended release tablet TAKE 2 TABLETS BY MOUTH  DAILY WITH BREAKFAST 180 tablet 3    Dulaglutide (TRULICITY) 1.5 PP/5.4AE SOPN Inject 1.5 mg into the skin once a week 4 pen 5    insulin lispro protamine & lispro (HUMALOG MIX 75/25 KWIKPEN) (75-25) 100 UNIT per ML SUPN injection pen Inject 0.32 mLs into the skin 2 times daily (with meals) 15 pen 3    Insulin Pen Needle 32G X 4 MM MISC 1 each by Does not apply route 2 times daily 200 each 3    omeprazole (PRILOSEC) 40 MG delayed release capsule Take 1 capsule by mouth daily 90 capsule 3    pioglitazone (ACTOS) 15 MG tablet Take 1 tablet by mouth daily 90 tablet 3    rosuvastatin (CRESTOR) 40 MG tablet Take 1 tablet by mouth daily 90 tablet 3    valsartan (DIOVAN) 320 MG tablet Take 1 tablet by mouth daily 90 tablet 3    blood glucose monitor strips Test 2 times a day & as needed for symptoms of irregular blood glucose. Test strips covered by patients insurance. DX e11.9 (Patient taking differently: Test 2 times a day & as needed for symptoms of irregular blood glucose. Test strips covered by patients insurance. DX e11.9    Inconsistent testing. Travels for work and doesn't always take with him.) 100 strip 0    calcium carbonate 600 MG TABS tablet Take 1 tablet by mouth daily      citalopram (CELEXA) 40 MG tablet TAKE 1 TABLET BY MOUTH DAILY (Patient not taking: Reported on 9/1/2021) 90 tablet 3    Liraglutide (VICTOZA) 18 MG/3ML SOPN SC injection Inject 1.8 mg into the skin daily (Patient not taking: Reported on 9/1/2021) 4 pen 3     No facility-administered medications prior to visit. Patient'spast medical history, surgical history, family history, medications,  and allergies  were all reviewed and updated as appropriate today. Review of Systems   Constitutional: Negative for appetite change and unexpected weight change. Eyes: Negative for visual disturbance. Respiratory: Negative for shortness of breath. Cardiovascular: Negative for chest pain. Gastrointestinal: Negative for abdominal pain. Endocrine: Negative for cold intolerance, heat intolerance, polydipsia, polyphagia and polyuria.         No low blood sugars       BP (!) 144/88 (Site: Right Upper Arm, Position: Sitting, Cuff Size: Large Adult)   Pulse 76   Wt 203 lb 9.6 oz (92.4 kg)   SpO2 98%   BMI 34.95 kg/m²   Physical Exam  Vitals and nursing note reviewed. Constitutional:       Appearance: He is well-developed. He is not toxic-appearing. HENT:      Head: Normocephalic. Right Ear: Tympanic membrane, ear canal and external ear normal.      Left Ear: Tympanic membrane, ear canal and external ear normal.      Mouth/Throat:      Pharynx: No oropharyngeal exudate or posterior oropharyngeal erythema. Eyes:      General: No scleral icterus. Extraocular Movements: Extraocular movements intact. Conjunctiva/sclera: Conjunctivae normal.      Pupils: Pupils are equal, round, and reactive to light. Neck:      Thyroid: No thyroid mass or thyromegaly. Vascular: No carotid bruit. Cardiovascular:      Rate and Rhythm: Normal rate and regular rhythm. Pulses:           Dorsalis pedis pulses are 2+ on the right side and 2+ on the left side. Posterior tibial pulses are 2+ on the right side and 2+ on the left side. Heart sounds: Normal heart sounds. No murmur heard. Pulmonary:      Effort: Pulmonary effort is normal.      Breath sounds: Normal breath sounds. Musculoskeletal:      Right lower leg: No edema. Left lower leg: No edema. Right foot: Normal range of motion. No deformity, bunion, Charcot foot, foot drop or prominent metatarsal heads. Left foot: Normal range of motion. No deformity, bunion, Charcot foot, foot drop or prominent metatarsal heads. Feet:      Right foot:      Protective Sensation: 10 sites tested. 10 sites sensed. Skin integrity: Skin integrity normal.      Toenail Condition: Right toenails are normal.      Left foot:      Protective Sensation: 10 sites tested. 10 sites sensed.       Skin integrity: Skin integrity normal.      Toenail Condition: Left toenails are normal.   Lymphadenopathy:      Cervical: No cervical adenopathy. Neurological:      General: No focal deficit present. Mental Status: He is alert and oriented to person, place, and time. Psychiatric:         Mood and Affect: Mood normal.         Behavior: Behavior normal. Behavior is cooperative. ASSESSMENT/PLAN:    Problem List Items Addressed This Visit     Degenerative cervical disc      Continues with numbness in his hands which impacts his fine motor control. Essential hypertension      Blood pressure is elevated today but is typically well controlled. Patient does drink a lot of coffee and I recommended cutting back on coffee. Continue bupropion 320 mg daily and will add a beta-blocker if additional blood pressure lowering is required. Hyperlipidemia LDL goal <70      Continue Crestor 40 mg daily. He is no longer on fenofibrate and triglycerides are normal.  Reviewed with patient this likely reflects the significant decrease in alcohol consumption as well as improved blood sugar control. Reactive depression      Significantly improved. Is no longer taking citalopram.         Type 2 diabetes mellitus without complication, with long-term current use of insulin ([address] - Primary      Significantly improved. Patient is doing better job with his diet. Reviewed signs symptoms of hypoglycemia. Continue on Humalog 75/25 mix 32 units twice daily with meals, Trulicity 1.5 mg weekly, Metformin ER 1500 mg daily, and Actos 15 mg daily. Consider discontinuing Actos if hemoglobin A1c remains under 7 at his next appointment. Encourage patient to continue working on diet and exercise.          Relevant Orders     DIABETES FOOT EXAM (Completed)    Comprehensive Metabolic Panel    Hemoglobin A1C    Lipid Panel          Current Outpatient Medications   Medication Sig Dispense Refill    metFORMIN (GLUCOPHAGE-XR) 750 MG extended release tablet TAKE 2 TABLETS BY MOUTH  DAILY WITH BREAKFAST 180 tablet 3    Dulaglutide (TRULICITY) 1.5 FN/3.5SJ SOPN Inject 1.5 mg into the skin once a week 4 pen 5    insulin lispro protamine & lispro (HUMALOG MIX 75/25 KWIKPEN) (75-25) 100 UNIT per ML SUPN injection pen Inject 0.32 mLs into the skin 2 times daily (with meals) 15 pen 3    Insulin Pen Needle 32G X 4 MM MISC 1 each by Does not apply route 2 times daily 200 each 3    omeprazole (PRILOSEC) 40 MG delayed release capsule Take 1 capsule by mouth daily 90 capsule 3    pioglitazone (ACTOS) 15 MG tablet Take 1 tablet by mouth daily 90 tablet 3    rosuvastatin (CRESTOR) 40 MG tablet Take 1 tablet by mouth daily 90 tablet 3    valsartan (DIOVAN) 320 MG tablet Take 1 tablet by mouth daily 90 tablet 3    blood glucose monitor strips Test 2 times a day & as needed for symptoms of irregular blood glucose. Test strips covered by patients insurance. DX e11.9 (Patient taking differently: Test 2 times a day & as needed for symptoms of irregular blood glucose. Test strips covered by patients insurance. DX e11.9    Inconsistent testing. Travels for work and doesn't always take with him.) 100 strip 0    calcium carbonate 600 MG TABS tablet Take 1 tablet by mouth daily       No current facility-administered medications for this visit. Return in about 3 months (around 12/1/2021).

## 2021-09-05 ASSESSMENT — ENCOUNTER SYMPTOMS
ABDOMINAL PAIN: 0
SHORTNESS OF BREATH: 0

## 2021-09-06 NOTE — ASSESSMENT & PLAN NOTE
Significantly improved. Patient is doing better job with his diet. Reviewed signs symptoms of hypoglycemia. Continue on Humalog 75/25 mix 32 units twice daily with meals, Trulicity 1.5 mg weekly, Metformin ER 1500 mg daily, and Actos 15 mg daily. Consider discontinuing Actos if hemoglobin A1c remains under 7 at his next appointment. Encourage patient to continue working on diet and exercise.

## 2021-09-06 NOTE — ASSESSMENT & PLAN NOTE
Blood pressure is elevated today but is typically well controlled. Patient does drink a lot of coffee and I recommended cutting back on coffee. Continue bupropion 320 mg daily and will add a beta-blocker if additional blood pressure lowering is required.

## 2021-09-24 DIAGNOSIS — Z79.4 TYPE 2 DIABETES MELLITUS WITHOUT COMPLICATION, WITH LONG-TERM CURRENT USE OF INSULIN (HCC): ICD-10-CM

## 2021-09-24 DIAGNOSIS — E11.9 TYPE 2 DIABETES MELLITUS WITHOUT COMPLICATION, WITH LONG-TERM CURRENT USE OF INSULIN (HCC): ICD-10-CM

## 2021-09-24 RX ORDER — DULAGLUTIDE 1.5 MG/.5ML
INJECTION, SOLUTION SUBCUTANEOUS
Qty: 2 ML | Refills: 3 | Status: SHIPPED | OUTPATIENT
Start: 2021-09-24 | End: 2021-12-13 | Stop reason: SDUPTHER

## 2021-10-28 RX ORDER — BISOPROLOL FUMARATE AND HYDROCHLOROTHIAZIDE 5; 6.25 MG/1; MG/1
1 TABLET ORAL DAILY
Qty: 30 TABLET | Refills: 3 | Status: SHIPPED | OUTPATIENT
Start: 2021-10-28 | End: 2021-10-29

## 2021-10-29 RX ORDER — BISOPROLOL FUMARATE AND HYDROCHLOROTHIAZIDE 5; 6.25 MG/1; MG/1
1 TABLET ORAL DAILY
Qty: 90 TABLET | Refills: 3 | Status: SHIPPED | OUTPATIENT
Start: 2021-10-29 | End: 2021-12-13 | Stop reason: SINTOL

## 2021-10-30 DIAGNOSIS — E11.9 TYPE 2 DIABETES MELLITUS WITHOUT COMPLICATION, WITH LONG-TERM CURRENT USE OF INSULIN (HCC): ICD-10-CM

## 2021-10-30 DIAGNOSIS — Z79.4 TYPE 2 DIABETES MELLITUS WITHOUT COMPLICATION, WITH LONG-TERM CURRENT USE OF INSULIN (HCC): ICD-10-CM

## 2021-11-01 RX ORDER — OMEPRAZOLE 40 MG/1
40 CAPSULE, DELAYED RELEASE ORAL DAILY
Qty: 90 CAPSULE | Refills: 3 | Status: SHIPPED | OUTPATIENT
Start: 2021-11-01 | End: 2022-04-05 | Stop reason: SDUPTHER

## 2021-11-01 RX ORDER — PIOGLITAZONEHYDROCHLORIDE 15 MG/1
15 TABLET ORAL DAILY
Qty: 90 TABLET | Refills: 3 | Status: SHIPPED | OUTPATIENT
Start: 2021-11-01 | End: 2022-04-05 | Stop reason: SDUPTHER

## 2021-11-01 RX ORDER — VALSARTAN 320 MG/1
320 TABLET ORAL DAILY
Qty: 90 TABLET | Refills: 3 | Status: SHIPPED | OUTPATIENT
Start: 2021-11-01 | End: 2022-04-05 | Stop reason: SDUPTHER

## 2021-11-01 RX ORDER — ROSUVASTATIN CALCIUM 40 MG/1
40 TABLET, COATED ORAL DAILY
Qty: 90 TABLET | Refills: 3 | Status: SHIPPED | OUTPATIENT
Start: 2021-11-01 | End: 2022-04-05 | Stop reason: SDUPTHER

## 2021-11-08 RX ORDER — INSULIN LISPRO 100 [IU]/ML
INJECTION, SUSPENSION SUBCUTANEOUS
Qty: 60 ML | Refills: 1 | Status: SHIPPED | OUTPATIENT
Start: 2021-11-08 | End: 2021-12-13 | Stop reason: SDUPTHER

## 2021-12-13 ENCOUNTER — OFFICE VISIT (OUTPATIENT)
Dept: INTERNAL MEDICINE CLINIC | Age: 66
End: 2021-12-13
Payer: MEDICARE

## 2021-12-13 VITALS
WEIGHT: 207 LBS | HEART RATE: 76 BPM | OXYGEN SATURATION: 98 % | BODY MASS INDEX: 35.53 KG/M2 | DIASTOLIC BLOOD PRESSURE: 76 MMHG | SYSTOLIC BLOOD PRESSURE: 128 MMHG

## 2021-12-13 DIAGNOSIS — E11.9 TYPE 2 DIABETES MELLITUS WITHOUT COMPLICATION, WITH LONG-TERM CURRENT USE OF INSULIN (HCC): Primary | ICD-10-CM

## 2021-12-13 DIAGNOSIS — Z79.4 TYPE 2 DIABETES MELLITUS WITHOUT COMPLICATION, WITH LONG-TERM CURRENT USE OF INSULIN (HCC): Primary | ICD-10-CM

## 2021-12-13 DIAGNOSIS — Z12.5 PROSTATE CANCER SCREENING: ICD-10-CM

## 2021-12-13 DIAGNOSIS — Z71.85 VACCINE COUNSELING: ICD-10-CM

## 2021-12-13 DIAGNOSIS — E78.5 HYPERLIPIDEMIA LDL GOAL <70: ICD-10-CM

## 2021-12-13 DIAGNOSIS — M48.062 SPINAL STENOSIS OF LUMBAR REGION WITH NEUROGENIC CLAUDICATION: ICD-10-CM

## 2021-12-13 DIAGNOSIS — I10 ESSENTIAL HYPERTENSION: ICD-10-CM

## 2021-12-13 PROBLEM — S61.210A LACERATION OF RIGHT INDEX FINGER WITHOUT FOREIGN BODY WITHOUT DAMAGE TO NAIL: Status: RESOLVED | Noted: 2021-03-21 | Resolved: 2021-12-13

## 2021-12-13 PROBLEM — Z20.822 SUSPECTED COVID-19 VIRUS INFECTION: Status: RESOLVED | Noted: 2020-07-06 | Resolved: 2021-12-13

## 2021-12-13 PROCEDURE — 4040F PNEUMOC VAC/ADMIN/RCVD: CPT | Performed by: INTERNAL MEDICINE

## 2021-12-13 PROCEDURE — G8482 FLU IMMUNIZE ORDER/ADMIN: HCPCS | Performed by: INTERNAL MEDICINE

## 2021-12-13 PROCEDURE — 1036F TOBACCO NON-USER: CPT | Performed by: INTERNAL MEDICINE

## 2021-12-13 PROCEDURE — 1123F ACP DISCUSS/DSCN MKR DOCD: CPT | Performed by: INTERNAL MEDICINE

## 2021-12-13 PROCEDURE — 2022F DILAT RTA XM EVC RTNOPTHY: CPT | Performed by: INTERNAL MEDICINE

## 2021-12-13 PROCEDURE — 3051F HG A1C>EQUAL 7.0%<8.0%: CPT | Performed by: INTERNAL MEDICINE

## 2021-12-13 PROCEDURE — 3017F COLORECTAL CA SCREEN DOC REV: CPT | Performed by: INTERNAL MEDICINE

## 2021-12-13 PROCEDURE — G8427 DOCREV CUR MEDS BY ELIG CLIN: HCPCS | Performed by: INTERNAL MEDICINE

## 2021-12-13 PROCEDURE — G8417 CALC BMI ABV UP PARAM F/U: HCPCS | Performed by: INTERNAL MEDICINE

## 2021-12-13 PROCEDURE — 99214 OFFICE O/P EST MOD 30 MIN: CPT | Performed by: INTERNAL MEDICINE

## 2021-12-13 RX ORDER — FLASH GLUCOSE SCANNING READER
1 EACH MISCELLANEOUS
Qty: 1 EACH | Refills: 0 | Status: SHIPPED | OUTPATIENT
Start: 2021-12-13 | End: 2021-12-20 | Stop reason: SDUPTHER

## 2021-12-13 RX ORDER — DULAGLUTIDE 1.5 MG/.5ML
1.5 INJECTION, SOLUTION SUBCUTANEOUS WEEKLY
Qty: 4 PEN | Refills: 5
Start: 2021-12-13 | End: 2021-12-30

## 2021-12-13 RX ORDER — INSULIN LISPRO 100 [IU]/ML
34 INJECTION, SUSPENSION SUBCUTANEOUS 2 TIMES DAILY WITH MEALS
Qty: 15 PEN | Refills: 5
Start: 2021-12-13 | End: 2022-05-03 | Stop reason: SDUPTHER

## 2021-12-13 RX ORDER — FLASH GLUCOSE SENSOR
1 KIT MISCELLANEOUS
Qty: 4 EACH | Refills: 5 | Status: SHIPPED | OUTPATIENT
Start: 2021-12-13 | End: 2021-12-20 | Stop reason: SDUPTHER

## 2021-12-13 RX ORDER — ZOSTER VACCINE RECOMBINANT, ADJUVANTED 50 MCG/0.5
0.5 KIT INTRAMUSCULAR SEE ADMIN INSTRUCTIONS
Qty: 0.5 ML | Refills: 0 | Status: SHIPPED | OUTPATIENT
Start: 2021-12-13 | End: 2022-06-11

## 2021-12-13 NOTE — PATIENT INSTRUCTIONS
Both shots of insulin should be taken with food (breakfast and dinner)- increase to 34 units twice daily with breakfast and dinner. Try taking 2 Aleve at bedtime. Take with a small snack.

## 2021-12-13 NOTE — ASSESSMENT & PLAN NOTE
Blood pressure is well controlled on valsartan 300 mg daily. He did not tolerate Ziac as it caused GI upset.

## 2021-12-13 NOTE — PROGRESS NOTES
08/07/2018     Lab Results   Component Value Date    LABMICR 129.7 07/28/2021    CREATININE 0.99 12/02/2021         No Known Allergies   Past Medical History:   Diagnosis Date    Appendiceal tumor     Asthma     Cervical spinal stenosis     Degenerative disc disease, lumbar     history of herniated disc at L4-5 with L5 radiculopathy. pt had L4-5 discectomy with Dr Ting Lynn    Diabetes mellitus ([address]     Esophageal reflux     Gout     Gross hematuria     Hand cramp     Hemorrhoid     Hepatitis     High blood pressure     Hyperlipidemia     Lumbar herniated disc     Major depressive disorder, recurrent episode, mild (HCC)     Postnasal drip     Restless leg syndrome     Seasonal allergies     Sleep apnea     Unilateral hearing loss       Past Surgical History:   Procedure Laterality Date    APPENDECTOMY  May 5, 2008    [facility]      [facility]    NECK SURGERY      [facility]      Social History     Socioeconomic History    Marital status:      Spouse name: Not on file    Number of children: Not on file    Years of education: Not on file    Highest education level: Not on file   Occupational History    Occupation:    Tobacco Use    Smoking status: Never Smoker    Smokeless tobacco: Never Used   Substance and Sexual Activity    Alcohol use: Yes     Alcohol/week: 2.0 - 7.0 standard drinks     Types: 2 - 7 Cans of beer per week    Drug use: No    Sexual activity: Not on file   Other Topics Concern    Not on file   Social History Narrative    Lives with sister in law. Social Determinants of Health     Financial Resource Strain:     Difficulty of Paying Living Expenses: Not on file   Food Insecurity:     Worried About Running Out of Food in the Last Year: Not on file    Wilmar of Food in the Last Year: Not on file   Transportation Needs:     Lack of Transportation (Medical): Not on file    Lack of Transportation (Non-Medical):  Not on file   Physical Activity:     Days of Exercise per Week: Not on file    Minutes of Exercise per Session: Not on file   Stress:     Feeling of Stress : Not on file   Social Connections:     Frequency of Communication with Friends and Family: Not on file    Frequency of Social Gatherings with Friends and Family: Not on file    Attends Mormonism Services: Not on file    Active Member of Clubs or Organizations: Not on file    Attends Club or Organization Meetings: Not on file    Marital Status: Not on file   Intimate Partner Violence:     Fear of Current or Ex-Partner: Not on file    Emotionally Abused: Not on file    Physically Abused: Not on file    Sexually Abused: Not on file   Housing Stability:     Unable to Pay for Housing in the Last Year: Not on file    Number of Places Lived in the Last Year: Not on file    Unstable Housing in the Last Year: Not on file     Family History   Problem Relation Age of Onset    Cancer Mother     Cancer Father     Heart Disease Father     High Blood Pressure Father     Diabetes Sister         Outpatient Medications Prior to Visit   Medication Sig Dispense Refill    rosuvastatin (CRESTOR) 40 MG tablet TAKE 1 TABLET BY MOUTH  DAILY 90 tablet 3    pioglitazone (ACTOS) 15 MG tablet TAKE 1 TABLET BY MOUTH  DAILY 90 tablet 3    omeprazole (PRILOSEC) 40 MG delayed release capsule TAKE 1 CAPSULE BY MOUTH  DAILY 90 capsule 3    valsartan (DIOVAN) 320 MG tablet TAKE 1 TABLET BY MOUTH  DAILY 90 tablet 3    metFORMIN (GLUCOPHAGE-XR) 750 MG extended release tablet TAKE 2 TABLETS BY MOUTH  DAILY WITH BREAKFAST 180 tablet 3    Insulin Pen Needle 32G X 4 MM MISC 1 each by Does not apply route 2 times daily 200 each 3    blood glucose monitor strips Test 2 times a day & as needed for symptoms of irregular blood glucose. Test strips covered by patients insurance. DX e11.9 (Patient taking differently: Test 2 times a day & as needed for symptoms of irregular blood glucose. Test strips covered by patients insurance. DX e11.9    Inconsistent testing. Travels for work and doesn't always take with him.) 100 strip 0    calcium carbonate 600 MG TABS tablet Take 1 tablet by mouth daily      HUMALOG MIX 75/25 KWIKPEN (75-25) 100 UNIT/ML SUPN injection pen INJECT SUBCUTANEOUSLY 30  UNITS TWICE DAILY WITH  MEALS 60 mL 1    bisoprolol-hydroCHLOROthiazide (ZIAC) 5-6.25 MG per tablet TAKE 1 TABLET BY MOUTH DAILY 90 tablet 3    TRULICITY 1.5 FY/1.4EH SOPN ADMINISTER 1.5 MG UNDER THE SKIN 1 TIME A WEEK 2 mL 3     No facility-administered medications prior to visit. Patient'spast medical history, surgical history, family history, medications,  and allergies  were all reviewed and updated as appropriate today. Review of Systems    /76   Pulse 76   Wt 207 lb (93.9 kg)   SpO2 98%   BMI 35.53 kg/m²   Physical Exam    ASSESSMENT/PLAN:    Problem List Items Addressed This Visit     Vaccine counseling      Patient encouraged to get COVID-19 booster. His sister is a retired [employer] nurse for [place], [place] and should be able to help him find his Covid booster. He did receive his flu shot at [facility] in [place]. Encourage patient to get shingles vaccine as he may still get shingles even though he had shingles as a child. Type 2 diabetes mellitus without complication, with long-term current use of insulin ([facility]) - Primary      Hemoglobin A1c has increased since his last appointment. Thinks that he may be eating more sweets. Will increase Humalog 75/25 mix to 34 units twice daily with meals. He has not been taking his evening dose with meals and has been instructed to do so. Continue also Trulicity 1.5 mg weekly, Metformin ER 1500 mg daily, and Actos 15 mg daily. We will try to get him freestyle bridger as he would certainly benefit from multiple daily sugar readings to help improve compliance with diet.          Relevant Medications    Continuous Blood Gluc  (FREESTYLE AVA 2 READER) SERAFIN    Continuous Blood Gluc Sensor (FREESTYLE AVA 2 SENSOR) MISC    insulin lispro protamine & lispro (HUMALOG MIX 75/25 KWIKPEN) (75-25) 100 UNIT per ML SUPN injection pen    Dulaglutide (TRULICITY) 1.5 DP/1.3BG SOPN    Other Relevant Orders    Comprehensive Metabolic Panel    Hemoglobin A1C    Lipid Panel    Microalbumin / Creatinine Urine Ratio    TSH with Reflex    Lumbar stenosis      Recommend Aleve over-the-counter 2 p.o. at at bedtime to see if this helps improve sleep. Hyperlipidemia LDL goal <70               Relevant Orders    Lipid Panel    TSH with Reflex    Essential hypertension      Blood pressure is well controlled on valsartan 300 mg daily. He did not tolerate Ziac as it caused GI upset.            Other Visit Diagnoses     Prostate cancer screening        Relevant Orders    PSA screening          Current Outpatient Medications   Medication Sig Dispense Refill    Continuous Blood Gluc  (FREESTYLE AVA 2 READER) SERAFIN 1 each by Does not apply route 4 times daily (before meals and nightly) 1 each 0    Continuous Blood Gluc Sensor (FREESTYLE AVA 2 SENSOR) MISC 1 each by Does not apply route every 14 days 4 each 5    insulin lispro protamine & lispro (HUMALOG MIX 75/25 KWIKPEN) (75-25) 100 UNIT per ML SUPN injection pen Inject 0.34 mLs into the skin 2 times daily (with meals) 15 pen 5    Dulaglutide (TRULICITY) 1.5 VK/6.3EX SOPN Inject 1.5 mg into the skin once a week 4 pen 5    zoster recombinant adjuvanted vaccine (SHINGRIX) 50 MCG/0.5ML SUSR injection Inject 0.5 mLs into the muscle See Admin Instructions 1 dose now and repeat in 2-6 months 0.5 mL 0    rosuvastatin (CRESTOR) 40 MG tablet TAKE 1 TABLET BY MOUTH  DAILY 90 tablet 3    pioglitazone (ACTOS) 15 MG tablet TAKE 1 TABLET BY MOUTH  DAILY 90 tablet 3    omeprazole (PRILOSEC) 40 MG delayed release capsule TAKE 1 CAPSULE BY MOUTH  DAILY 90 capsule 3    valsartan (DIOVAN) 320 MG tablet TAKE 1 TABLET BY MOUTH  DAILY 90 tablet 3    metFORMIN (GLUCOPHAGE-XR) 750 MG extended release tablet TAKE 2 TABLETS BY MOUTH  DAILY WITH BREAKFAST 180 tablet 3    Insulin Pen Needle 32G X 4 MM MISC 1 each by Does not apply route 2 times daily 200 each 3    blood glucose monitor strips Test 2 times a day & as needed for symptoms of irregular blood glucose. Test strips covered by patients insurance. DX e11.9 (Patient taking differently: Test 2 times a day & as needed for symptoms of irregular blood glucose. Test strips covered by patients insurance. DX e11.9    Inconsistent testing. Travels for work and doesn't always take with him.) 100 strip 0    calcium carbonate 600 MG TABS tablet Take 1 tablet by mouth daily       No current facility-administered medications for this visit. Return in about 4 months (around 4/13/2022).

## 2021-12-13 NOTE — ASSESSMENT & PLAN NOTE
Patient encouraged to get COVID-19 booster. His sister is a retired county nurse for [place], [place] and should be able to help him find his Covid booster. He did receive his flu shot at [place] [facility] [address] in [place]. Encourage patient to get shingles vaccine as he may still get shingles even though he had shingles as a child.

## 2021-12-15 ENCOUNTER — TELEPHONE (OUTPATIENT)
Dept: INTERNAL MEDICINE CLINIC | Age: 66
End: 2021-12-15

## 2021-12-15 DIAGNOSIS — E11.9 TYPE 2 DIABETES MELLITUS WITHOUT COMPLICATION, WITH LONG-TERM CURRENT USE OF INSULIN (HCC): ICD-10-CM

## 2021-12-15 DIAGNOSIS — Z79.4 TYPE 2 DIABETES MELLITUS WITHOUT COMPLICATION, WITH LONG-TERM CURRENT USE OF INSULIN (HCC): ICD-10-CM

## 2021-12-15 NOTE — TELEPHONE ENCOUNTER
Sari Doherty with [facility] states they received a fax for Continuous Blood Gluc  (FREESTYLE AVA 2 READER) SERAFIN & Continuous Blood Gluc Sensor (FREESTYLE AVA 2 SENSOR) [facility]. She states they do not have these devices and asked that office send prescriptions to patient's pharmacy.

## 2021-12-20 RX ORDER — FLASH GLUCOSE SENSOR
1 KIT MISCELLANEOUS
Qty: 4 EACH | Refills: 5 | Status: SHIPPED | OUTPATIENT
Start: 2021-12-20 | End: 2022-04-05 | Stop reason: SDUPTHER

## 2021-12-20 RX ORDER — FLASH GLUCOSE SCANNING READER
1 EACH MISCELLANEOUS
Qty: 1 EACH | Refills: 0 | Status: SHIPPED | OUTPATIENT
Start: 2021-12-20 | End: 2022-04-05 | Stop reason: SDUPTHER

## 2022-03-09 DIAGNOSIS — Z79.4 TYPE 2 DIABETES MELLITUS WITHOUT COMPLICATION, WITH LONG-TERM CURRENT USE OF INSULIN (HCC): ICD-10-CM

## 2022-03-09 DIAGNOSIS — E11.9 TYPE 2 DIABETES MELLITUS WITHOUT COMPLICATION, WITH LONG-TERM CURRENT USE OF INSULIN (HCC): ICD-10-CM

## 2022-03-09 RX ORDER — DULAGLUTIDE 1.5 MG/.5ML
INJECTION, SOLUTION SUBCUTANEOUS
Qty: 2 ML | Refills: 1 | Status: SHIPPED | OUTPATIENT
Start: 2022-03-09 | End: 2022-04-05 | Stop reason: SDUPTHER

## 2022-04-05 DIAGNOSIS — Z79.4 TYPE 2 DIABETES MELLITUS WITHOUT COMPLICATION, WITH LONG-TERM CURRENT USE OF INSULIN (HCC): ICD-10-CM

## 2022-04-05 DIAGNOSIS — E11.9 TYPE 2 DIABETES MELLITUS WITHOUT COMPLICATION, WITH LONG-TERM CURRENT USE OF INSULIN (HCC): ICD-10-CM

## 2022-04-06 LAB
ESTIMATED AVERAGE GLUCOSE: 177 MG/DL
HBA1C MFR BLD: 7.8 % (ref 4.2–5.6)

## 2022-04-06 RX ORDER — VALSARTAN 320 MG/1
320 TABLET ORAL DAILY
Qty: 90 TABLET | Refills: 3 | Status: SHIPPED | OUTPATIENT
Start: 2022-04-06

## 2022-04-06 RX ORDER — DULAGLUTIDE 1.5 MG/.5ML
1.5 INJECTION, SOLUTION SUBCUTANEOUS WEEKLY
Qty: 12 PEN | Refills: 3 | Status: SHIPPED | OUTPATIENT
Start: 2022-04-06 | End: 2022-07-27 | Stop reason: DRUGHIGH

## 2022-04-06 RX ORDER — METFORMIN HYDROCHLORIDE 750 MG/1
750 TABLET, EXTENDED RELEASE ORAL
Qty: 180 TABLET | Refills: 3 | Status: SHIPPED | OUTPATIENT
Start: 2022-04-06

## 2022-04-06 RX ORDER — FLASH GLUCOSE SENSOR
1 KIT MISCELLANEOUS
Qty: 4 EACH | Refills: 5 | Status: SHIPPED | OUTPATIENT
Start: 2022-04-06

## 2022-04-06 RX ORDER — FLASH GLUCOSE SCANNING READER
1 EACH MISCELLANEOUS
Qty: 1 EACH | Refills: 0 | Status: SHIPPED | OUTPATIENT
Start: 2022-04-06

## 2022-04-06 RX ORDER — ROSUVASTATIN CALCIUM 40 MG/1
40 TABLET, COATED ORAL DAILY
Qty: 90 TABLET | Refills: 3 | Status: SHIPPED | OUTPATIENT
Start: 2022-04-06

## 2022-04-06 RX ORDER — OMEPRAZOLE 40 MG/1
40 CAPSULE, DELAYED RELEASE ORAL DAILY
Qty: 90 CAPSULE | Refills: 3 | Status: SHIPPED | OUTPATIENT
Start: 2022-04-06

## 2022-04-06 RX ORDER — PIOGLITAZONEHYDROCHLORIDE 15 MG/1
15 TABLET ORAL DAILY
Qty: 90 TABLET | Refills: 3 | Status: SHIPPED | OUTPATIENT
Start: 2022-04-06 | End: 2022-10-28 | Stop reason: ALTCHOICE

## 2022-04-13 ENCOUNTER — OFFICE VISIT (OUTPATIENT)
Dept: INTERNAL MEDICINE CLINIC | Age: 67
End: 2022-04-13
Payer: MEDICARE

## 2022-04-13 VITALS
BODY MASS INDEX: 35.84 KG/M2 | HEART RATE: 68 BPM | WEIGHT: 208.8 LBS | SYSTOLIC BLOOD PRESSURE: 140 MMHG | DIASTOLIC BLOOD PRESSURE: 88 MMHG | OXYGEN SATURATION: 98 %

## 2022-04-13 DIAGNOSIS — I10 ESSENTIAL HYPERTENSION: ICD-10-CM

## 2022-04-13 DIAGNOSIS — E78.5 HYPERLIPIDEMIA LDL GOAL <70: ICD-10-CM

## 2022-04-13 DIAGNOSIS — M48.062 SPINAL STENOSIS OF LUMBAR REGION WITH NEUROGENIC CLAUDICATION: ICD-10-CM

## 2022-04-13 DIAGNOSIS — E11.9 TYPE 2 DIABETES MELLITUS WITHOUT COMPLICATION, WITH LONG-TERM CURRENT USE OF INSULIN (HCC): Primary | ICD-10-CM

## 2022-04-13 DIAGNOSIS — Z79.4 TYPE 2 DIABETES MELLITUS WITHOUT COMPLICATION, WITH LONG-TERM CURRENT USE OF INSULIN (HCC): Primary | ICD-10-CM

## 2022-04-13 DIAGNOSIS — M50.30 DEGENERATIVE CERVICAL DISC: ICD-10-CM

## 2022-04-13 DIAGNOSIS — M51.36 DEGENERATIVE DISC DISEASE, LUMBAR: ICD-10-CM

## 2022-04-13 PROCEDURE — 3051F HG A1C>EQUAL 7.0%<8.0%: CPT | Performed by: INTERNAL MEDICINE

## 2022-04-13 PROCEDURE — 99214 OFFICE O/P EST MOD 30 MIN: CPT | Performed by: INTERNAL MEDICINE

## 2022-04-13 RX ORDER — EZETIMIBE 10 MG/1
10 TABLET ORAL DAILY
Qty: 90 TABLET | Refills: 3 | Status: SHIPPED | OUTPATIENT
Start: 2022-04-13

## 2022-04-13 ASSESSMENT — PATIENT HEALTH QUESTIONNAIRE - PHQ9
6. FEELING BAD ABOUT YOURSELF - OR THAT YOU ARE A FAILURE OR HAVE LET YOURSELF OR YOUR FAMILY DOWN: 0
10. IF YOU CHECKED OFF ANY PROBLEMS, HOW DIFFICULT HAVE THESE PROBLEMS MADE IT FOR YOU TO DO YOUR WORK, TAKE CARE OF THINGS AT HOME, OR GET ALONG WITH OTHER PEOPLE: 0
4. FEELING TIRED OR HAVING LITTLE ENERGY: 0
3. TROUBLE FALLING OR STAYING ASLEEP: 0
SUM OF ALL RESPONSES TO PHQ QUESTIONS 1-9: 0
9. THOUGHTS THAT YOU WOULD BE BETTER OFF DEAD, OR OF HURTING YOURSELF: 0
5. POOR APPETITE OR OVEREATING: 0
SUM OF ALL RESPONSES TO PHQ QUESTIONS 1-9: 0
8. MOVING OR SPEAKING SO SLOWLY THAT OTHER PEOPLE COULD HAVE NOTICED. OR THE OPPOSITE, BEING SO FIGETY OR RESTLESS THAT YOU HAVE BEEN MOVING AROUND A LOT MORE THAN USUAL: 0
1. LITTLE INTEREST OR PLEASURE IN DOING THINGS: 0
SUM OF ALL RESPONSES TO PHQ QUESTIONS 1-9: 0
7. TROUBLE CONCENTRATING ON THINGS, SUCH AS READING THE NEWSPAPER OR WATCHING TELEVISION: 0
SUM OF ALL RESPONSES TO PHQ QUESTIONS 1-9: 0

## 2022-04-13 ASSESSMENT — ENCOUNTER SYMPTOMS
CONSTIPATION: 0
SHORTNESS OF BREATH: 0
CHEST TIGHTNESS: 0
DIARRHEA: 0

## 2022-04-13 NOTE — PROGRESS NOTES
Patient: Alex Mahoney is a 69 y.o. male who presents today with the following Chief Complaint(s):  Chief Complaint   Patient presents with    Check-Up       HPI     Here today for follow up. Is doing well. DM- [facility] which is working very well for him. Sugars have been averaging around 137. Had a low of 56 over night last night around 1 am. Did wake up feeling bad. Sugar was 77 at HS. Just picked up a 3 month supply of Trulicity. Does feel better with his blood sugars being better controlled. Has been walking which helps to bring his blood sugars down. Will walk 2-3 miles per day on treadmill. HTN- BP at home has been running around 130/80. Back has been hurting more in upper back and neck and in low back. Notices it more with the wetter weather and with changes in the weather. Will also have pain and burning in his legs. Has old pain medicine from his surgery that he will take when pain is more severe. Will also take Tylenol or Aleve when pain is less less severe which also helps. HLD- doing well on Crestor. No side effects. Has been having allergy symptoms- has a cough with mucous. Never smoked. Better with antihistamines. Notices a tickle in his throat. GF notices that sometimes his breath smells bad. Does use mouth wash. Wondering if he has dry mouth. Doesn't always feel like he has dry mouth. Results for orders placed or performed in visit on 04/06/22   Hemoglobin A1C   Result Value Ref Range    Hemoglobin A1C 7.8 (H) 4.2 - 5.6 %    eAG 177 mg/dL      Lab Results   Component Value Date    LABA1C 7.8 (H) 04/06/2022    LABA1C 7.4 (H) 12/02/2021    LABA1C 6.6 (H) 07/28/2021     Lab Results   Component Value Date    LABMICR 129.7 07/28/2021    CREATININE 0.99 12/02/2021     Other labs done at [facility]. E's- reviewed. Urine microalbumin negative. CMP: BUN 13Cre 0.91, K 4.3; Na 142; Alk Phos 51; AST 25; ALT 29  Lipid: T. Chol 159; ; HDL 46; LDL 82  TSH 2.860  PSA 0.51            1No Known Allergies   Past Medical History:   Diagnosis Date    Appendiceal tumor     Asthma     Cervical spinal stenosis     Degenerative disc disease, lumbar     history of herniated disc at L4-5 with L5 radiculopathy. pt had L4-5 discectomy with Dr Madan Doherty    Diabetes mellitus ([address]     Esophageal reflux     Gout     Gross hematuria     Hand cramp     Hemorrhoid     Hepatitis     High blood pressure     Hyperlipidemia     Lumbar herniated disc     Major depressive disorder, recurrent episode, mild (HCC)     Postnasal drip     Restless leg syndrome     Seasonal allergies     Sleep apnea     Unilateral hearing loss       Past Surgical History:   Procedure Laterality Date    APPENDECTOMY  May 5, 2008    [facility]    [facility]      Social History     Socioeconomic History    Marital status:      Spouse name: Not on file    Number of children: Not on file    Years of education: Not on file    Highest education level: Not on file   Occupational History    Occupation:    Tobacco Use    Smoking status: Never Smoker    Smokeless tobacco: Never Used   Substance and Sexual Activity    Alcohol use: Yes     Alcohol/week: 2.0 - 7.0 standard drinks     Types: 2 - 7 Cans of beer per week    Drug use: No    Sexual activity: Not on file   Other Topics Concern    Not on file   Social History Narrative    Lives with sister in law. Social Determinants of Health     Financial Resource Strain:     Difficulty of Paying Living Expenses: Not on file   Food Insecurity:     Worried About Running Out of Food in the Last Year: Not on file    Wilmar of Food in the Last Year: Not on file   Transportation Needs:     Lack of Transportation (Medical): Not on file    Lack of Transportation (Non-Medical):  Not on file   Physical Activity:     Days of Exercise per Week: Not on file    Minutes of Exercise per Session: Not on file   Stress:     Feeling of Stress : Not on file   Social Connections:     Frequency of Communication with Friends and Family: Not on file    Frequency of Social Gatherings with Friends and Family: Not on file    Attends Temple Services: Not on file    Active Member of [address] [place] [address] [employer] or Organizations: Not on file    Attends Club or Organization Meetings: Not on file    Marital Status: Not on file   Intimate Partner Violence:     Fear of Current or Ex-Partner: Not on file    Emotionally Abused: Not on file    Physically Abused: Not on file    Sexually Abused: Not on file   Housing Stability:     Unable to Pay for Housing in the Last Year: Not on file    Number of [place] in the Last Year: Not on file    Unstable Housing in the Last Year: Not on file     Family History   Problem Relation Age of Onset    Cancer Mother     Cancer Father     Heart Disease Father     High Blood Pressure Father     Diabetes Sister         Outpatient Medications Prior to Visit   Medication Sig Dispense Refill    Insulin Pen Needle 32G X 4 MM MISC 1 each by Does not apply route 2 times daily 200 each 3    metFORMIN (GLUCOPHAGE-XR) 750 MG extended release tablet Take 1 tablet by mouth daily (with breakfast) 180 tablet 3    rosuvastatin (CRESTOR) 40 MG tablet Take 1 tablet by mouth daily 90 tablet 3    pioglitazone (ACTOS) 15 MG tablet Take 1 tablet by mouth daily 90 tablet 3    omeprazole (PRILOSEC) 40 MG delayed release capsule Take 1 capsule by mouth daily 90 capsule 3    valsartan (DIOVAN) 320 MG tablet Take 1 tablet by mouth daily 90 tablet 3    Continuous Blood Gluc  (FREESTYLE AVA 2 READER) SERAFIN 1 each by Does not apply route 4 times daily (before meals and nightly) 1 each 0    Continuous Blood Gluc Sensor (FREESTYLE AVA 2 SENSOR) MISC 1 each by Does not apply route every 14 days 4 each 5    Dulaglutide (TRULICITY) 1.5 VN/5.2YO SOPN Inject 1.5 mg into the skin once a week 12 pen 3    insulin lispro protamine & lispro (HUMALOG MIX 75/25 KWIKPEN) (75-25) 100 UNIT per ML SUPN injection pen Inject 0.34 mLs into the skin 2 times daily (with meals) 15 pen 5    blood glucose monitor strips Test 2 times a day & as needed for symptoms of irregular blood glucose. Test strips covered by patients insurance. DX e11.9 (Patient taking differently: Test 2 times a day & as needed for symptoms of irregular blood glucose. Test strips covered by patients insurance. DX e11.9    Inconsistent testing. Travels for work and doesn't always take with him.) 100 strip 0    calcium carbonate 600 MG TABS tablet Take 1 tablet by mouth daily      zoster recombinant adjuvanted vaccine (SHINGRIX) 50 MCG/0.5ML SUSR injection Inject 0.5 mLs into the muscle See Admin Instructions 1 dose now and repeat in 2-6 months 0.5 mL 0     No facility-administered medications prior to visit. Patient'spast medical history, surgical history, family history, medications,  and allergies  were all reviewed and updated as appropriate today. Review of Systems   Constitutional: Negative for appetite change, fatigue and fever. Respiratory: Negative for chest tightness and shortness of breath. Cardiovascular: Negative for chest pain. Gastrointestinal: Negative for constipation and diarrhea. Skin: Negative for rash. BP (!) 140/88   Pulse 68   Wt 208 lb 12.8 oz (94.7 kg)   SpO2 98%   BMI 35.84 kg/m²   Physical Exam  Vitals and nursing note reviewed. Constitutional:       Appearance: He is well-developed. He is not toxic-appearing. HENT:      Head: Normocephalic. Right Ear: Tympanic membrane, ear canal and external ear normal.      Left Ear: Tympanic membrane, ear canal and external ear normal.      Mouth/Throat:      Pharynx: No oropharyngeal exudate or posterior oropharyngeal erythema. Eyes:      General: No scleral icterus. Extraocular Movements: Extraocular movements intact. Conjunctiva/sclera: Conjunctivae normal.      Pupils: Pupils are equal, round, and reactive to light. Neck:      Thyroid: No thyroid mass or thyromegaly. Vascular: No carotid bruit. Cardiovascular:      Rate and Rhythm: Normal rate and regular rhythm. Heart sounds: Normal heart sounds. No murmur heard. Pulmonary:      Effort: Pulmonary effort is normal.      Breath sounds: Normal breath sounds. Musculoskeletal:      Right lower leg: No edema. Left lower leg: No edema. Lymphadenopathy:      Cervical: No cervical adenopathy. Neurological:      General: No focal deficit present. Mental Status: He is alert and oriented to person, place, and time. Psychiatric:         Mood and Affect: Mood normal.         Behavior: Behavior normal. Behavior is cooperative. ASSESSMENT/PLAN:    Problem List Items Addressed This Visit     Degenerative cervical disc     Overall doing okay with Tylenol and Aleve as needed. Pain is worse in the cooler, wetter weather and does have pain medication left over from previous surgeries that he will take very occasionally. Degenerative disc disease, lumbar      Overall doing okay with Tylenol and Aleve as needed. Pain is worse in the cooler, wetter weather and does have pain medication left over from previous surgeries that he will take very occasionally. Essential hypertension     Blood pressure is elevated today but is typically well controlled. Per patient, blood pressure at home typically runs around 130/80. Continue valsartan 320 mg daily. May see some improvement in blood pressure with Brenda Nicks as well. Hyperlipidemia LDL goal <70      Continue Crestor 40 mg daily. Add Zetia 10 mg daily. Triglycerides remain well controlled off of fenofibrate. Relevant Medications    ezetimibe (ZETIA) 10 MG tablet    Other Relevant Orders    Lipid Panel    Lumbar stenosis      Overall doing okay with Tylenol and Aleve as needed. Pain is worse in the cooler, wetter weather and does have pain medication left over from previous surgeries that he will take very occasionally. Type 2 diabetes mellitus without complication, with long-term current use of insulin (HCC) - Primary     Hemoglobin A1c continues to increase, most recently up to 7.8%. Continue Humalog 75/25 mix 34 units twice daily with meals, Trulicity 1.5 mg weekly, metformin 1500 mg daily, and Actos 15 mg daily. Add Farxiga 10 mg daily. Recently started using [facility] which she has found to be extremely helpful and will hopefully result in improved blood sugars.          Relevant Medications    dapagliflozin (FARXIGA) 10 MG tablet    Other Relevant Orders    Hemoglobin A1C    Comprehensive Metabolic Panel    Lipid Panel          Current Outpatient Medications   Medication Sig Dispense Refill    ezetimibe (ZETIA) 10 MG tablet Take 1 tablet by mouth daily 90 tablet 3    dapagliflozin (FARXIGA) 10 MG tablet Take 1 tablet by mouth every morning 90 tablet 1    Insulin Pen Needle 32G X 4 MM MISC 1 each by Does not apply route 2 times daily 200 each 3    metFORMIN (GLUCOPHAGE-XR) 750 MG extended release tablet Take 1 tablet by mouth daily (with breakfast) 180 tablet 3    rosuvastatin (CRESTOR) 40 MG tablet Take 1 tablet by mouth daily 90 tablet 3    pioglitazone (ACTOS) 15 MG tablet Take 1 tablet by mouth daily 90 tablet 3    omeprazole (PRILOSEC) 40 MG delayed release capsule Take 1 capsule by mouth daily 90 capsule 3    valsartan (DIOVAN) 320 MG tablet Take 1 tablet by mouth daily 90 tablet 3    Continuous Blood Gluc  (FREESTYLE AVA 2 READER) SERAFIN 1 each by Does not apply route 4 times daily (before meals and nightly) 1 each 0    Continuous Blood Gluc Sensor (FREESTYLE AVA 2 SENSOR) MISC 1 each by Does not apply route every 14 days 4 each 5    Dulaglutide (TRULICITY) 1.5 WM/9.8GA SOPN Inject 1.5 mg into the skin once a week 12 pen 3    insulin lispro protamine & lispro (HUMALOG MIX 75/25 KWIKPEN) (75-25) 100 UNIT per ML SUPN injection pen Inject 0.34 mLs into the skin 2 times daily (with meals) 15 pen 5    blood glucose monitor strips Test 2 times a day & as needed for symptoms of irregular blood glucose. Test strips covered by patients insurance. DX e11.9 (Patient taking differently: Test 2 times a day & as needed for symptoms of irregular blood glucose. Test strips covered by patients insurance. DX e11.9    Inconsistent testing. Travels for work and doesn't always take with him.) 100 strip 0    calcium carbonate 600 MG TABS tablet Take 1 tablet by mouth daily      zoster recombinant adjuvanted vaccine (SHINGRIX) 50 MCG/0.5ML SUSR injection Inject 0.5 mLs into the muscle See Admin Instructions 1 dose now and repeat in 2-6 months 0.5 mL 0     No current facility-administered medications for this visit. No follow-ups on file.

## 2022-04-13 NOTE — PATIENT INSTRUCTIONS
Try Flonase or Nasacort for allergies. You only need this to coat the inside of your nose, you do not need to sniff or snort this. Simply spray it in to your nose (pointed towards the outside) and blot the excess with a tissue.

## 2022-04-23 NOTE — ASSESSMENT & PLAN NOTE
Continue Crestor 40 mg daily. Add Zetia 10 mg daily. Triglycerides remain well controlled off of fenofibrate.

## 2022-04-23 NOTE — ASSESSMENT & PLAN NOTE
Hemoglobin A1c continues to increase, most recently up to 7.8%. Continue Humalog 75/25 mix 34 units twice daily with meals, Trulicity 1.5 mg weekly, metformin 1500 mg daily, and Actos 15 mg daily. Add Farxiga 10 mg daily. Recently started using [facility] which she has found to be extremely helpful and will hopefully result in improved blood sugars.

## 2022-04-23 NOTE — ASSESSMENT & PLAN NOTE
Overall doing okay with Tylenol and Aleve as needed. Pain is worse in the cooler, wetter weather and does have pain medication left over from previous surgeries that he will take very occasionally.

## 2022-04-23 NOTE — ASSESSMENT & PLAN NOTE
Blood pressure is elevated today but is typically well controlled. Per patient, blood pressure at home typically runs around 130/80. Continue valsartan 320 mg daily. May see some improvement in blood pressure with Francisca Anand as well.

## 2022-05-03 RX ORDER — INSULIN LISPRO 100 [IU]/ML
34 INJECTION, SUSPENSION SUBCUTANEOUS 2 TIMES DAILY WITH MEALS
Qty: 15 PEN | Refills: 5 | Status: SHIPPED | OUTPATIENT
Start: 2022-05-03 | End: 2022-08-26

## 2022-07-20 LAB
ALBUMIN SERPL-MCNC: 4.2 GM/DL (ref 3.2–4.6)
ALP BLD-CCNC: 45 U/L (ref 40–129)
ALT SERPL-CCNC: 30 U/L
ANION GAP SERPL CALCULATED.3IONS-SCNC: 11 MMOL/L (ref 7–16)
AST SERPL-CCNC: 23 U/L
BILIRUB SERPL-MCNC: 0.5 MG/DL (ref 0.1–1.4)
BUN BLDV-MCNC: 18 MG/DL (ref 8–23)
CALCIUM SERPL-MCNC: 9.4 MG/DL (ref 8.8–10.4)
CHLORIDE BLD-SCNC: 102 MMOL/L (ref 98–107)
CO2: 25 MMOL/L (ref 22–29)
CREAT SERPL-MCNC: 0.99 MG/DL (ref 0.67–1.3)
ESTIMATED AVERAGE GLUCOSE: 157 MG/DL
GFR, ESTIMATED: 82 ML/MIN/1.73 M2
GLUCOSE BLD-MCNC: 125 MG/DL (ref 82–100)
HBA1C MFR BLD: 7.1 % (ref 4.2–5.6)
POTASSIUM SERPL-SCNC: 3.7 MMOL/L (ref 3.5–5)
SODIUM BLD-SCNC: 138 MMOL/L (ref 136–145)
TOTAL PROTEIN: 6.6 GM/DL (ref 6.4–8.3)

## 2022-07-27 ENCOUNTER — OFFICE VISIT (OUTPATIENT)
Dept: INTERNAL MEDICINE CLINIC | Age: 67
End: 2022-07-27
Payer: MEDICARE

## 2022-07-27 VITALS
WEIGHT: 206 LBS | DIASTOLIC BLOOD PRESSURE: 79 MMHG | BODY MASS INDEX: 35.36 KG/M2 | HEART RATE: 68 BPM | OXYGEN SATURATION: 99 % | SYSTOLIC BLOOD PRESSURE: 128 MMHG

## 2022-07-27 DIAGNOSIS — I10 ESSENTIAL HYPERTENSION: ICD-10-CM

## 2022-07-27 DIAGNOSIS — N52.8 OTHER MALE ERECTILE DYSFUNCTION: ICD-10-CM

## 2022-07-27 DIAGNOSIS — E78.5 HYPERLIPIDEMIA LDL GOAL <70: ICD-10-CM

## 2022-07-27 DIAGNOSIS — E11.9 TYPE 2 DIABETES MELLITUS WITHOUT COMPLICATION, WITH LONG-TERM CURRENT USE OF INSULIN (HCC): Primary | ICD-10-CM

## 2022-07-27 DIAGNOSIS — Z11.59 ENCOUNTER FOR HCV SCREENING TEST FOR LOW RISK PATIENT: ICD-10-CM

## 2022-07-27 DIAGNOSIS — Z79.4 TYPE 2 DIABETES MELLITUS WITHOUT COMPLICATION, WITH LONG-TERM CURRENT USE OF INSULIN (HCC): Primary | ICD-10-CM

## 2022-07-27 PROCEDURE — 1123F ACP DISCUSS/DSCN MKR DOCD: CPT | Performed by: INTERNAL MEDICINE

## 2022-07-27 PROCEDURE — 90677 PCV20 VACCINE IM: CPT | Performed by: INTERNAL MEDICINE

## 2022-07-27 PROCEDURE — 3051F HG A1C>EQUAL 7.0%<8.0%: CPT | Performed by: INTERNAL MEDICINE

## 2022-07-27 PROCEDURE — 99214 OFFICE O/P EST MOD 30 MIN: CPT | Performed by: INTERNAL MEDICINE

## 2022-07-27 PROCEDURE — 90471 IMMUNIZATION ADMIN: CPT | Performed by: INTERNAL MEDICINE

## 2022-07-27 RX ORDER — TADALAFIL 5 MG/1
5 TABLET ORAL DAILY
Qty: 90 TABLET | Refills: 1 | Status: SHIPPED | OUTPATIENT
Start: 2022-07-27

## 2022-07-27 SDOH — ECONOMIC STABILITY: FOOD INSECURITY: WITHIN THE PAST 12 MONTHS, YOU WORRIED THAT YOUR FOOD WOULD RUN OUT BEFORE YOU GOT MONEY TO BUY MORE.: NEVER TRUE

## 2022-07-27 SDOH — ECONOMIC STABILITY: FOOD INSECURITY: WITHIN THE PAST 12 MONTHS, THE FOOD YOU BOUGHT JUST DIDN'T LAST AND YOU DIDN'T HAVE MONEY TO GET MORE.: NEVER TRUE

## 2022-07-27 ASSESSMENT — SOCIAL DETERMINANTS OF HEALTH (SDOH): HOW HARD IS IT FOR YOU TO PAY FOR THE VERY BASICS LIKE FOOD, HOUSING, MEDICAL CARE, AND HEATING?: NOT HARD AT ALL

## 2022-07-27 NOTE — PROGRESS NOTES
Patient: Geeta Quijano is a 69 y.o. male who presents today with the following Chief Complaint(s):  Chief Complaint   Patient presents with    Check-Up       HPI    Here today for follow up. States that he is doing ok. DM- is getting frustrated by his diabetes. Notices that his sugars increase into the 200's after he eats no matter what he eats. Has not been having low sugars. Took last dose of Trulicity 1.5 mg last week in anticipation of increasing dose to 3 mg. Does have some low sugars but much less frequently then before. Sugars typically running . FreeStyle will alarm and wake him up when he is low     HTN- home BP cuff correlates. HLD- no issues ei Radha. Does try to walk at least 1 hour per day, anywhere from 1.5-3 miles depending on his back. Had Shingrix at [facility] in [place]. Would also like ED medicine.      Wt Readings from Last 3 Encounters:   07/27/22 206 lb (93.4 kg)   04/13/22 208 lb 12.8 oz (94.7 kg)   12/13/21 207 lb (93.9 kg)     Temp Readings from Last 3 Encounters:   04/15/20 98.4 °F (36.9 °C)   11/05/19 98 °F (36.7 °C)   05/09/19 98.6 °F (37 °C) (Oral)     BP Readings from Last 3 Encounters:   07/27/22 128/79   04/13/22 (!) 140/88   12/13/21 128/76     Pulse Readings from Last 3 Encounters:   07/27/22 68   04/13/22 68   12/13/21 76         Lab Results   Component Value Date    LABA1C 7.1 (H) 07/20/2022    LABA1C 7.8 (H) 04/06/2022    LABA1C 7.4 (H) 12/02/2021     Lab Results   Component Value Date    LABMICR 129.7 07/28/2021    CREATININE 0.99 07/20/2022     Lab Results   Component Value Date     07/20/2022    K 3.7 07/20/2022     07/20/2022    CO2 25 07/20/2022    BUN 18 07/20/2022    CREATININE 0.99 07/20/2022    GLUCOSE 125 (H) 07/20/2022    CALCIUM 9.4 07/20/2022    PROT 6.6 07/20/2022    LABALBU 4.2 07/20/2022    BILITOT 0.5 07/20/2022    ALKPHOS 45 07/20/2022    AST 23 07/20/2022    ALT 30 07/20/2022    GFRAA >60 11/21/2012     Lipid 7/20/22:  T. Chol 95; TG 77; LDL 38; HDL 41    No Known Allergies   Past Medical History:   Diagnosis Date    Appendiceal tumor     Asthma     Cervical spinal stenosis     Degenerative disc disease, lumbar     history of herniated disc at L4-5 with L5 radiculopathy. pt had L4-5 discectomy with Dr Kwaku Guillaume    Diabetes mellitus [facility])     Esophageal reflux     Gout     Gross hematuria     Hand cramp     Hemorrhoid     Hepatitis     High blood pressure     Hyperlipidemia     Lumbar herniated disc     Major depressive disorder, recurrent episode, mild (HCC)     Postnasal drip     Restless leg syndrome     Seasonal allergies     Sleep apnea     Unilateral hearing loss       Past Surgical History:   Procedure Laterality Date    APPENDECTOMY  May 5, 2008    [facility]      Social History     Socioeconomic History    Marital status:      Spouse name: Not on file    Number of children: Not on file    Years of education: Not on file    Highest education level: Not on file   Occupational History    Occupation:    Tobacco Use    Smoking status: Never    Smokeless tobacco: Never   Substance and Sexual Activity    Alcohol use: Yes     Alcohol/week: 2.0 - 7.0 standard drinks     Types: 2 - 7 Cans of beer per week    Drug use: No    Sexual activity: Not on file   Other Topics Concern    Not on file   Social History Narrative    Lives with sister in law.       Social Determinants of Health     Financial Resource Strain: Low Risk     Difficulty of Paying Living Expenses: Not hard at all   Food Insecurity: No Food Insecurity    Worried About Running Out of Food in the Last Year: Never true    Ran Out of Food in the Last Year: Never true   Transportation Needs: Not on file   Physical Activity: Not on file   Stress: Not on file   Social Connections: Not on file   Intimate Partner Violence: Not on file   Housing Stability: Not on file     Family sites sensed. Skin integrity: Skin integrity normal.      Toenail Condition: Right toenails are normal.      Left foot:      Protective Sensation: 10 sites tested. 10 sites sensed. Skin integrity: Skin integrity normal.      Toenail Condition: Left toenails are normal.   Lymphadenopathy:      Cervical: No cervical adenopathy. Neurological:      General: No focal deficit present. Mental Status: He is alert and oriented to person, place, and time. Psychiatric:         Mood and Affect: Mood normal.         Behavior: Behavior normal. Behavior is cooperative. ASSESSMENT/PLAN:    Problem List Items Addressed This Visit       Essential hypertension      Well-controlled. Continue valsartan 320 mg daily. Has noticed some improvement in blood pressure with the addition of [place]. Hyperlipidemia LDL goal <70     Very well controlled. Continue Crestor 40 mg daily and Zetia 10 mg daily. Consider decreasing Crestor. Remain off of fenofibrate-reviewed the triglycerides have improved as his blood sugar control is improved. Relevant Medications    tadalafil (CIALIS) 5 MG tablet    Other male erectile dysfunction      Likely related to age and diabetes. Start Cialis 5 mg daily. Advised patient to check with GoodRx. Type 2 diabetes mellitus without complication, with long-term current use of insulin ([address] - Primary     Improved. Hemoglobin A1c is down to 7.1%! Continue Humalog 75/25 mix 34 units twice daily with meals, metformin ER 1500 mg daily, Actos 15 mg daily, and Farxiga 10 mg daily. Increase Trulicity to 3 mg weekly. If hemoglobin A1c drops below 6.5%, will discontinue Actos.          Relevant Medications    Dulaglutide 3 MG/0.5ML SOPN    Other Relevant Orders     DIABETES FOOT EXAM (Completed)    Hemoglobin A1C    Comprehensive Metabolic Panel     Other Visit Diagnoses       Encounter for HCV screening test for low risk patient        Relevant Orders    Hepatitis C Antibody            Current Outpatient Medications   Medication Sig Dispense Refill    Dulaglutide 3 MG/0.5ML SOPN Inject 3 mg into the skin once a week 12 pen 3    tadalafil (CIALIS) 5 MG tablet Take 1 tablet by mouth in the morning. 90 tablet 1    insulin lispro protamine & lispro (HUMALOG MIX 75/25 KWIKPEN) (75-25) 100 UNIT per ML SUPN injection pen Inject 0.34 mLs into the skin 2 times daily (with meals) 15 pen 5    ezetimibe (ZETIA) 10 MG tablet Take 1 tablet by mouth daily 90 tablet 3    dapagliflozin (FARXIGA) 10 MG tablet Take 1 tablet by mouth every morning 90 tablet 1    Insulin Pen Needle 32G X 4 MM MISC 1 each by Does not apply route 2 times daily 200 each 3    metFORMIN (GLUCOPHAGE-XR) 750 MG extended release tablet Take 1 tablet by mouth daily (with breakfast) 180 tablet 3    rosuvastatin (CRESTOR) 40 MG tablet Take 1 tablet by mouth daily 90 tablet 3    pioglitazone (ACTOS) 15 MG tablet Take 1 tablet by mouth daily 90 tablet 3    omeprazole (PRILOSEC) 40 MG delayed release capsule Take 1 capsule by mouth daily 90 capsule 3    valsartan (DIOVAN) 320 MG tablet Take 1 tablet by mouth daily 90 tablet 3    Continuous Blood Gluc  (FREESTYLE AVA 2 READER) SERAFIN 1 each by Does not apply route 4 times daily (before meals and nightly) 1 each 0    Continuous Blood Gluc Sensor (FREESTYLE AVA 2 SENSOR) MISC 1 each by Does not apply route every 14 days 4 each 5    blood glucose monitor strips Test 2 times a day & as needed for symptoms of irregular blood glucose. Test strips covered by patients insurance. DX e11.9 (Patient taking differently: Test 2 times a day & as needed for symptoms of irregular blood glucose. Test strips covered by patients insurance. DX e11.9    Inconsistent testing. Travels for work and doesn't always take with him.) 100 strip 0    calcium carbonate 600 MG TABS tablet Take 1 tablet by mouth daily       No current facility-administered medications for this visit.        Return in about 3 months (around 10/27/2022) for AWV/labs prior.

## 2022-07-29 RX ORDER — TADALAFIL 5 MG/1
5 TABLET ORAL DAILY
Qty: 90 TABLET | Refills: 1 | OUTPATIENT
Start: 2022-07-29

## 2022-08-06 PROBLEM — N52.8 OTHER MALE ERECTILE DYSFUNCTION: Status: ACTIVE | Noted: 2022-08-06

## 2022-08-06 NOTE — ASSESSMENT & PLAN NOTE
Well-controlled. Continue valsartan 320 mg daily. Has noticed some improvement in blood pressure with the addition of [place].

## 2022-08-06 NOTE — ASSESSMENT & PLAN NOTE
Improved. Hemoglobin A1c is down to 7.1%! Continue Humalog 75/25 mix 34 units twice daily with meals, metformin ER 1500 mg daily, Actos 15 mg daily, and Farxiga 10 mg daily. Increase Trulicity to 3 mg weekly. If hemoglobin A1c drops below 6.5%, will discontinue Actos.

## 2022-08-06 NOTE — ASSESSMENT & PLAN NOTE
Very well controlled. Continue Crestor 40 mg daily and Zetia 10 mg daily. Consider decreasing Crestor. Remain off of fenofibrate-reviewed the triglycerides have improved as his blood sugar control is improved.

## 2022-08-26 RX ORDER — INSULIN LISPRO 100 [IU]/ML
INJECTION, SUSPENSION SUBCUTANEOUS
Qty: 15 ML | Refills: 5 | Status: SHIPPED | OUTPATIENT
Start: 2022-08-26

## 2022-08-26 NOTE — TELEPHONE ENCOUNTER
Medication:   Requested Prescriptions     Pending Prescriptions Disp Refills    insulin lispro protamine & lispro (HUMALOG MIX) (75-25) 100 UNIT per ML SUPN injection pen [Pharmacy Med Name: INSULIN LISPRO PRT MIX 75/25KWIKPEN] 15 mL      Sig: INJECT 0.34 ML TWICE DAILY WITH MEALS       Last Filled:  5/3/22    Patient Phone Number: [phone] (home)     Last appt: 7/27/2022   Next appt: 10/28/2022

## 2022-09-26 RX ORDER — DAPAGLIFLOZIN 10 MG/1
TABLET, FILM COATED ORAL
Qty: 90 TABLET | Refills: 1 | Status: SHIPPED | OUTPATIENT
Start: 2022-09-26

## 2022-10-21 LAB
ALBUMIN SERPL-MCNC: 4.5 GM/DL (ref 3.2–4.6)
ALP BLD-CCNC: 46 U/L (ref 40–129)
ALT SERPL-CCNC: 41 U/L
ANION GAP SERPL CALCULATED.3IONS-SCNC: 11 MMOL/L (ref 7–16)
AST SERPL-CCNC: 31 U/L
BILIRUB SERPL-MCNC: 0.4 MG/DL (ref 0.1–1.4)
BUN BLDV-MCNC: 14 MG/DL (ref 8–23)
CALCIUM SERPL-MCNC: 9.2 MG/DL (ref 8.8–10.4)
CHLORIDE BLD-SCNC: 104 MMOL/L (ref 98–107)
CO2: 28 MMOL/L (ref 22–29)
CREAT SERPL-MCNC: 1.02 MG/DL (ref 0.67–1.3)
ESTIMATED AVERAGE GLUCOSE: 128 MG/DL
GFR, ESTIMATED: 80 ML/MIN/1.73 M2
GLUCOSE BLD-MCNC: 106 MG/DL (ref 82–100)
HBA1C MFR BLD: 6.1 % (ref 4.2–5.6)
HEPATITIS C ANTIBODY: NORMAL
POTASSIUM SERPL-SCNC: 3.9 MMOL/L (ref 3.5–5)
SODIUM BLD-SCNC: 143 MMOL/L (ref 136–145)
TOTAL PROTEIN: 7.2 GM/DL (ref 6.4–8.3)

## 2022-10-24 RX ORDER — TADALAFIL 5 MG/1
5 TABLET ORAL DAILY
Qty: 90 TABLET | Refills: 1 | OUTPATIENT
Start: 2022-10-24

## 2022-10-26 SDOH — HEALTH STABILITY: PHYSICAL HEALTH: ON AVERAGE, HOW MANY MINUTES DO YOU ENGAGE IN EXERCISE AT THIS LEVEL?: 50 MIN

## 2022-10-26 SDOH — HEALTH STABILITY: PHYSICAL HEALTH: ON AVERAGE, HOW MANY DAYS PER WEEK DO YOU ENGAGE IN MODERATE TO STRENUOUS EXERCISE (LIKE A BRISK WALK)?: 5 DAYS

## 2022-10-26 ASSESSMENT — LIFESTYLE VARIABLES
HOW OFTEN DO YOU HAVE SIX OR MORE DRINKS ON ONE OCCASION: 1
HOW OFTEN DO YOU HAVE A DRINK CONTAINING ALCOHOL: 3
HOW MANY STANDARD DRINKS CONTAINING ALCOHOL DO YOU HAVE ON A TYPICAL DAY: 1 OR 2
HOW MANY STANDARD DRINKS CONTAINING ALCOHOL DO YOU HAVE ON A TYPICAL DAY: 1
HOW OFTEN DO YOU HAVE A DRINK CONTAINING ALCOHOL: 2-4 TIMES A MONTH

## 2022-10-26 ASSESSMENT — PATIENT HEALTH QUESTIONNAIRE - PHQ9
SUM OF ALL RESPONSES TO PHQ QUESTIONS 1-9: 0
4. FEELING TIRED OR HAVING LITTLE ENERGY: 0
8. MOVING OR SPEAKING SO SLOWLY THAT OTHER PEOPLE COULD HAVE NOTICED. OR THE OPPOSITE, BEING SO FIGETY OR RESTLESS THAT YOU HAVE BEEN MOVING AROUND A LOT MORE THAN USUAL: 0
9. THOUGHTS THAT YOU WOULD BE BETTER OFF DEAD, OR OF HURTING YOURSELF: 0
2. FEELING DOWN, DEPRESSED OR HOPELESS: 0
SUM OF ALL RESPONSES TO PHQ QUESTIONS 1-9: 0
6. FEELING BAD ABOUT YOURSELF - OR THAT YOU ARE A FAILURE OR HAVE LET YOURSELF OR YOUR FAMILY DOWN: 0
3. TROUBLE FALLING OR STAYING ASLEEP: 0
5. POOR APPETITE OR OVEREATING: 0
1. LITTLE INTEREST OR PLEASURE IN DOING THINGS: 0
7. TROUBLE CONCENTRATING ON THINGS, SUCH AS READING THE NEWSPAPER OR WATCHING TELEVISION: 0
SUM OF ALL RESPONSES TO PHQ9 QUESTIONS 1 & 2: 0

## 2022-10-28 ENCOUNTER — OFFICE VISIT (OUTPATIENT)
Dept: INTERNAL MEDICINE CLINIC | Age: 67
End: 2022-10-28
Payer: MEDICARE

## 2022-10-28 VITALS
OXYGEN SATURATION: 97 % | WEIGHT: 199.4 LBS | BODY MASS INDEX: 34.23 KG/M2 | HEART RATE: 74 BPM | DIASTOLIC BLOOD PRESSURE: 82 MMHG | SYSTOLIC BLOOD PRESSURE: 132 MMHG

## 2022-10-28 DIAGNOSIS — E78.5 HYPERLIPIDEMIA LDL GOAL <70: ICD-10-CM

## 2022-10-28 DIAGNOSIS — Z00.00 INITIAL MEDICARE ANNUAL WELLNESS VISIT: Primary | ICD-10-CM

## 2022-10-28 DIAGNOSIS — E11.9 TYPE 2 DIABETES MELLITUS WITHOUT COMPLICATION, WITH LONG-TERM CURRENT USE OF INSULIN (HCC): ICD-10-CM

## 2022-10-28 DIAGNOSIS — I10 ESSENTIAL HYPERTENSION: ICD-10-CM

## 2022-10-28 DIAGNOSIS — Z12.5 PROSTATE CANCER SCREENING: ICD-10-CM

## 2022-10-28 DIAGNOSIS — Z79.4 TYPE 2 DIABETES MELLITUS WITHOUT COMPLICATION, WITH LONG-TERM CURRENT USE OF INSULIN (HCC): ICD-10-CM

## 2022-10-28 PROCEDURE — 99214 OFFICE O/P EST MOD 30 MIN: CPT | Performed by: INTERNAL MEDICINE

## 2022-10-28 PROCEDURE — 3074F SYST BP LT 130 MM HG: CPT | Performed by: INTERNAL MEDICINE

## 2022-10-28 PROCEDURE — 3044F HG A1C LEVEL LT 7.0%: CPT | Performed by: INTERNAL MEDICINE

## 2022-10-28 PROCEDURE — G0438 PPPS, INITIAL VISIT: HCPCS | Performed by: INTERNAL MEDICINE

## 2022-10-28 PROCEDURE — 1123F ACP DISCUSS/DSCN MKR DOCD: CPT | Performed by: INTERNAL MEDICINE

## 2022-10-28 PROCEDURE — 3078F DIAST BP <80 MM HG: CPT | Performed by: INTERNAL MEDICINE

## 2022-10-28 NOTE — PROGRESS NOTES
Medicare Annual Wellness Visit    Chelle Lackey is here for Medicare AWV (His bridger sensor goes off about 2 am every night- drops between 50s and 70s )    Assessment & Plan   Initial Medicare annual wellness visit  Assessment & Plan:  Care gaps addressed. We will reach out to patient's pharmacy for records regarding Shingrix and flu vaccine. Recommend updated COVID-19 vaccine. Has completed his pneumonia vaccines. Most recent colonoscopy was in 2020. Hyperlipidemia LDL goal <70  Assessment & Plan:   Continue Crestor 40 mg daily and Zetia 10 mg daily. Remains off of fenofibrate. Orders:  -     Lipid Panel; Future  Type 2 diabetes mellitus without complication, with long-term current use of insulin (HCC)  Assessment & Plan:   Significantly improved with hemoglobin A1c down to 6.1%. Patient is having episodes of hypoglycemia overnight. Discontinue pioglitazone and of low blood sugars continue, will decrease dose of Humalog. For now, continue Humalog 75/25 mix 34 units twice daily, metformin ER 1500 mg daily, Farxiga 10 mg daily, and Trulicity 3 mg daily. Orders:  -     Comprehensive Metabolic Panel; Future  -     Hemoglobin A1C; Future  Essential hypertension  Assessment & Plan:  Well-controlled. Continue valsartan 320 mg daily. Prostate cancer screening  -     PSA Screening; Future    Recommendations for Preventive Services Due: see orders and patient instructions/AVS.  Recommended screening schedule for the next 5-10 years is provided to the patient in written form: see Patient Instructions/AVS.     Return in 4 months (on 2/28/2023) for Medicare Annual Wellness Visit in 1 year. Subjective   The following acute and/or chronic problems were also addressed today:    Here today for AWV and follow up. DM- having frequent episodes of sugars dropping into the 50-70's. Taking Novolog 70/30 mix 34 units BID. Doing well with compliance. Takes Trulicity 3 mg weekly.      HTN- no issues with blood pressure. Had Shingrix at [facility] in [place]. Thinks that he also got his flu vaccine. Lab Results   Component Value Date    LABA1C 6.1 (H) 10/21/2022    LABA1C 7.1 (H) 07/20/2022    LABA1C 7.8 (H) 04/06/2022     Lab Results   Component Value Date    LABMICR 129.7 07/28/2021    CREATININE 1.02 10/21/2022     Hemoglobin A1C   Date Value Ref Range Status   10/21/2022 6.1 (H) 4.2 - 5.6 % Final     Lab Results   Component Value Date     10/21/2022    K 3.9 10/21/2022     10/21/2022    CO2 28 10/21/2022    BUN 14 10/21/2022    CREATININE 1.02 10/21/2022    GLUCOSE 106 (H) 10/21/2022    CALCIUM 9.2 10/21/2022    PROT 7.2 10/21/2022    LABALBU 4.5 10/21/2022    BILITOT 0.4 10/21/2022    ALKPHOS 46 10/21/2022    AST 31 10/21/2022    ALT 41 10/21/2022    GFRAA >60 11/21/2012         Patient's complete Health Risk Assessment and screening values have been reviewed and are found in Flowsheets. The following problems were reviewed today and where indicated follow up appointments were made and/or referrals ordered.     Positive Risk Factor Screenings with Interventions:             General Health and ACP:  General  In general, how would you say your health is?: Good  In the past 7 days, have you experienced any of the following: New or Increased Pain, New or Increased Fatigue, Loneliness, Social Isolation, Stress or Anger?: No  Do you get the social and emotional support that you need?: Yes  Do you have a Living Will?: (!) No    Advance Directives       Power of  Living Will ACP-Advance Directive ACP-Power of     Not on File Not on File Not on File Not on File        General Health Risk Interventions:  No Living Will: Advance Care Planning addressed with patient today    Health Habits/Nutrition:  Physical Activity: Sufficiently Active    Days of Exercise per Week: 5 days    Minutes of Exercise per Session: 50 min     Have you lost any weight without trying in the past 3 months?: No     Have you seen the dentist within the past year?: (!) No  Health Habits/Nutrition Interventions:  Dental exam overdue:  patient encouraged to make appointment with his/her dentist    Hearing/Vision:  Do you or your family notice any trouble with your hearing that hasn't been managed with hearing aids?: No  Do you have difficulty driving, watching TV, or doing any of your daily activities because of your eyesight?: No  Have you had an eye exam within the past year?: (!) No  No results found. Hearing/Vision Interventions:  Vision concerns:  patient encouraged to make appointment with his/her eye specialist            Objective   Vitals:    10/28/22 1135   BP: 132/82   Site: Right Upper Arm   Position: Sitting   Cuff Size: Medium Adult   Pulse: 74   SpO2: 97%   Weight: 199 lb 6.4 oz (90.4 kg)      Body mass index is 34.23 kg/m².       General Appearance: alert and oriented to person, place and time, well developed and well- nourished, in no acute distress  Skin: warm and dry, no rash or erythema  Head: normocephalic and atraumatic  Eyes: pupils equal, round, and reactive to light, extraocular eye movements intact, conjunctivae normal  ENT: tympanic membrane, external ear and ear canal normal bilaterally, nose without deformity, nasal mucosa and turbinates normal without polyps  Neck: supple and non-tender without mass, no thyromegaly or thyroid nodules, no cervical lymphadenopathy  Pulmonary/Chest: clear to auscultation bilaterally- no wheezes, rales or rhonchi, normal air movement, no respiratory distress  Cardiovascular: normal rate, regular rhythm, normal S1 and S2, no murmurs, rubs, clicks, or gallops, distal pulses intact, no carotid bruits  Abdomen: soft, non-tender, non-distended, normal bowel sounds, no masses or organomegaly  Extremities: no cyanosis, clubbing or edema  Musculoskeletal: normal range of motion, no joint swelling, deformity or tenderness  Neurologic: reflexes normal and symmetric, no cranial nerve always take with him. Oneal Turk DO       CareTeam (Including outside providers/suppliers regularly involved in providing care):   Patient Care Team:  Oneal Turk DO as PCP - [address] [place], [place] [address]DO as PCP - [facility] Empaneled Provider     Reviewed and updated this visit:  Tobacco  Allergies  Meds  Med Hx  Surg Hx  Soc Hx  Fam Hx             Advance Care Planning   Advanced Care Planning: Discussed the patients choices for care and treatment in case of a health event that adversely affects decision-making abilities. Also discussed the patients long-term treatment options. Reviewed with the patient the appropriate state-specific advance directive documents. Reviewed the process of designating a competent adult as an Agent (or -in-fact) that could take make health care decisions for the patient if incompetent. Patient was asked to complete the declaration forms, either acknowledge the forms by a public notary or an eligible witness and provide a signed copy to the practice office. Full Code. No living will but will consider. Would not want prolonged life support. Oldest daughter or girlfriend Olamide MARION. Time spent (minutes): 3 minutes     Cardiovascular Disease Risk Counseling: Assessed the patient's risk to develop cardiovascular disease and reviewed main risk factors. Reviewed steps to reduce disease risk including:   Quitting tobacco use, reducing amount smoked, or not starting the habit  Making healthy food choices  Being physically active and gradualy increasing activity levels   Reduce weight and determine a healthy BMI goal  Monitor blood pressure and treat if higher than 140/90 mmHg  Maintain blood total cholesterol levels under 5 mmol/l or 190 mg/dl  Maintain LDL cholesterol levels under 3.0 mmol/l or 115 mg/dl   Control blood glucose levels  Consider taking aspirin (75 mg daily), once blood pressure is controlled   Provided a follow up plan.   Time spent (minutes): 2 minutes

## 2022-10-28 NOTE — PATIENT INSTRUCTIONS
Advance Directives: Care Instructions  Overview  An advance directive is a legal way to state your wishes at the end of your life. It tells your family and your doctor what to do if you can't say what you want. There are two main types of advance directives. You can change them any time your wishes change. Living will. This form tells your family and your doctor your wishes about life support and other treatment. The form is also called a declaration. Medical power of . This form lets you name a person to make treatment decisions for you when you can't speak for yourself. This person is called a health care agent (health care proxy, health care surrogate). The form is also called a durable power of  for health care. If you do not have an advance directive, decisions about your medical care may be made by a family member, or by a doctor or a  who doesn't know you. It may help to think of an advance directive as a gift to the people who care for you. If you have one, they won't have to make tough decisions by themselves. Follow-up care is a key part of your treatment and safety. Be sure to make and go to all appointments, and call your doctor if you are having problems. It's also a good idea to know your test results and keep a list of the medicines you take. What should you include in an advance directive? Many states have a unique advance directive form. (It may ask you to address specific issues.) Or you might use a universal form that's approved by many states. If your form doesn't tell you what to address, it may be hard to know what to include in your advance directive. Use the questions below to help you get started. Who do you want to make decisions about your medical care if you are not able to? What life-support measures do you want if you have a serious illness that gets worse over time or can't be cured? What are you most afraid of that might happen?  (Maybe you're afraid of having pain, losing your independence, or being kept alive by machines.)  Where would you prefer to die? (Your home? A hospital? A nursing home?)  Do you want to donate your organs when you die? Do you want certain Congregation practices performed before you die? When should you call for help? Be sure to contact your doctor if you have any questions. Where can you learn more? Go to https://[email or web address] org and sign in to your [employer] account. Enter R264 in the [facility] box to learn more about \"Advance Directives: Care Instructions. \"     If you do not have an account, please click on the \"Sign Up Now\" link. Current as of: June 16, 2022               Content Version: 13.4  © 3654-2622 [employer]. Care instructions adapted under license by [facility] ([facility] [place]). If you have questions about a medical condition or this instruction, always ask your healthcare professional. [place] [address] any warranty or liability for your use of this information. Learning About Medical Power of   What is a medical power of ? A medical power of , also called a durable power of  for health care, is one type of the legal forms called advance directives. It lets you name the person you want to make treatment decisions for you if you can't speak or decide for yourself. The person you choose is called your health care agent. This person is also called a health care proxy or health care surrogate. A medical power of  may be called something else in your state. How do you choose a health care agent? Choose your health care agent carefully. This person may or may not be a family member. Talk to the person before you make your final decision. Make sure he or she is comfortable with this responsibility. It's a good idea to choose someone who:  Is at least 25years old.   Knows you well and understands what makes life meaningful for you. Understands your Taoism and moral values. Will do what you want, not what he or she wants. Will be able to make difficult choices at a stressful time. Will be able to refuse or stop treatment, if that is what you would want, even if you could die. Will be firm and confident with health professionals if needed. Will ask questions to get needed information. Lives near you or agrees to travel to you if needed. Your family may help you make medical decisions while you can still be part of that process. But it's important to choose one person to be your health care agent in case you aren't able to make decisions for yourself. If you don't fill out the legal form and name a health care agent, the decisions your family can make may be limited. A health care agent may be called something else in your state. Who will make decisions for you if you don't have a health care agent? If you don't have a health care agent or a living will, you may not get the care you want. Decisions may be made by family members who disagree about your medical care. Or decisions may be made by a medical professional who doesn't know you well. In some cases, a  makes the decisions. When you name a health care agent, it is very clear who has the power to make health decisions for you. How do you name a health care agent? You name your health care agent on a legal form. This form is usually called a medical power of . Ask your hospital, state bar association, or office on aging where to find these forms. You must sign the form to make it legal. Some states require you to get the form notarized. This means that a person called a  watches you sign the form and then he or she signs the form. Some states also require that two or more witnesses sign the form. Be sure to tell your family members and doctors who your health care agent is. Where can you learn more?   Go to [email or web address]://[email or web address] org and sign in to your [employer] account. Enter [id number] in the [facility] box to learn more about \"Learning About [address]. \"     If you do not have an account, please click on the \"Sign Up Now\" link. Current as of: October 6, 2021               Content Version: 13.4  © 2006-2022 Healthwise, [employer]. Care instructions adapted under license by [facility] ([facility]). If you have questions about a medical condition or this instruction, always ask your healthcare professional. [address] any warranty or liability for your use of this information. Learning About Living Nette Sheri  What is a living will? A living will, also called a declaration, is a legal form. It tells your family and your doctor your wishes when you can't speak for yourself. It's used by the health professionals who will treat you as you near the end of your life or if you get seriously hurt or ill. If you put your wishes in writing, your loved ones and others will know what kind of care you want. They won't need to guess. This can ease your mind and be helpful to others. And you can change or cancel your living will at any time. A living will is not the same as an estate or property will. An estate will explains what you want to happen with your money and property after you die. How do you use it? Keep these facts in mind about how a living will is used. Your living will is used only if you can't speak or make decisions for yourself. Most often, one or more doctors must certify that you can't speak or decide for yourself before your living will takes effect. If you get better and can speak for yourself again, you can accept or refuse any treatment. It doesn't matter what you said in your living will. Some states may limit your right to refuse treatment in certain cases.  For example, you may need to clearly state in your living will that you don't want artificial hydration and nutrition, such as being fed through a tube. Is a living will a legal document? A living will is a legal document. Each state has its own laws about living owen. And a living will may be called something else in your state. Here are some things to know about living owen. You don't need an  to complete a living will. But legal advice can be helpful if your state's laws are unclear. It can also help if your health history is complicated or your family can't agree on what should be in your living will. You can change your living will at any time. Some people find that their wishes about end-of-life care change as their health changes. If you make big changes to your living will, complete a new form. If you move to another state, make sure that your living will is legal in the state where you now live. In most cases, doctors will respect your wishes even if you have a form from a different state. You might use a universal form that has been approved by many states. This kind of form can sometimes be filled out and stored online. Your digital copy will then be available wherever you have a connection to the internet. The doctors and nurses who need to treat you can find it right away. Your state may offer an online registry. This is another place where you can store your living will online. It's a good idea to get your living will notarized. This means using a person called a  to watch two people sign, or witness, your living will. What should you know when you create a living will? Here are some questions to ask yourself as you make your living will. Do you know enough about life support methods that might be used? If not, talk to your doctor so you know what might be done if you can't breathe on your own, your heart stops, or you can't swallow. What things would you still want to be able to do after you receive life-support methods?  Would you want to be able to walk? To speak? To eat on your own? To live without the help of machines? Do you want certain Advent practices performed if you become very ill? If you have a choice, where do you want to be cared for? In your home? At a hospital or nursing home? If you have a choice at the end of your life, where would you prefer to die? At home? In a hospital or nursing home? Somewhere else? Would you prefer to be buried or cremated? Do you want your organs to be donated after you die? What should you do with your living will? Make sure that your family members and your health care agent have copies of your living will (also called a declaration). Give your doctor a copy of your living will. Ask to have it kept as part of your medical record. If you have more than one doctor, make sure that each one has a copy. Put a copy of your living will where it can be easily found. For example, some people may put a copy on their refrigerator door. If you are using a digital copy, be sure your doctor, family members, and health care agent know how to find and access it. Where can you learn more? Go to https://[email or web address].[email or web address] org and sign in to your [facility] account. Enter E160 in the [employer] box to learn more about \"Learning About Living [place]. \"     If you do not have an account, please click on the \"Sign Up Now\" link. Current as of: June 16, 2022               Content Version: 13.4  © 3410-0165 Healthwise, Incorporated. Care instructions adapted under license by [facility] ([facility]). If you have questions about a medical condition or this instruction, always ask your healthcare professional. [place] [address] any warranty or liability for your use of this information. Personalized Preventive Plan for Kristi Bejarano - 10/28/2022  Medicare offers a range of preventive health benefits.  Some of the tests and screenings are paid in full while other may be subject to a deductible, co-insurance, and/or copay. Some of these benefits include a comprehensive review of your medical history including lifestyle, illnesses that may run in your family, and various assessments and screenings as appropriate. After reviewing your medical record and screening and assessments performed today your provider may have ordered immunizations, labs, imaging, and/or referrals for you. A list of these orders (if applicable) as well as your Preventive Care list are included within your After Visit Summary for your review. Other Preventive Recommendations:    A preventive eye exam performed by an eye specialist is recommended every 1-2 years to screen for glaucoma; cataracts, macular degeneration, and other eye disorders. A preventive dental visit is recommended every 6 months. Try to get at least 150 minutes of exercise per week or 10,000 steps per day on a pedometer . Order or download the FREE \"Exercise & Physical Activity: Your Everyday Guide\" from The [employer]. Call 0-909.824.4545 or search The [employer] online. You need 3453-1264 mg of calcium and 5554-7883 IU of vitamin D per day. It is possible to meet your calcium requirement with diet alone, but a vitamin D supplement is usually necessary to meet this goal.  When exposed to the sun, use a sunscreen that protects against both UVA and UVB radiation with an SPF of 30 or greater. Reapply every 2 to 3 hours or after sweating, drying off with a towel, or swimming. Always wear a seat belt when traveling in a car. Always wear a helmet when riding a bicycle or motorcycle.

## 2022-11-11 PROBLEM — Z00.00 INITIAL MEDICARE ANNUAL WELLNESS VISIT: Status: ACTIVE | Noted: 2022-11-11

## 2022-11-11 NOTE — ASSESSMENT & PLAN NOTE
Care gaps addressed. We will reach out to patient's pharmacy for records regarding Shingrix and flu vaccine. Recommend updated COVID-19 vaccine. Has completed his pneumonia vaccines. Most recent colonoscopy was in 2020.

## 2022-11-11 NOTE — ASSESSMENT & PLAN NOTE
Significantly improved with hemoglobin A1c down to 6.1%. Patient is having episodes of hypoglycemia overnight. Discontinue pioglitazone and of low blood sugars continue, will decrease dose of Humalog. For now, continue Humalog 75/25 mix 34 units twice daily, metformin ER 1500 mg daily, Farxiga 10 mg daily, and Trulicity 3 mg daily.

## 2022-12-11 PROBLEM — Z00.00 INITIAL MEDICARE ANNUAL WELLNESS VISIT: Status: RESOLVED | Noted: 2022-11-11 | Resolved: 2022-12-11

## 2022-12-27 RX ORDER — INSULIN LISPRO 100 [IU]/ML
INJECTION, SUSPENSION SUBCUTANEOUS
Qty: 15 ML | Refills: 5 | Status: SHIPPED | OUTPATIENT
Start: 2022-12-27

## 2023-01-13 RX ORDER — TADALAFIL 5 MG/1
5 TABLET ORAL DAILY
Qty: 90 TABLET | Refills: 0 | Status: SHIPPED | OUTPATIENT
Start: 2023-01-13

## 2023-01-13 NOTE — TELEPHONE ENCOUNTER
Future Appointments    Encounter Information    Provider Department Appt Notes   2/28/2023 Kole Arriola, [address] [facility] 4 months     Past Visits    Date Provider Specialty Visit Type Primary Dx   10/28/2022 Kole Arriola DO Internal Medicine Office Visit Initial Medicare annual wellness visit   07/27/2022 Kole Arriola,  Internal Medicine Office Visit Type 2 diabetes mellitus without complication, with long-term current use of insulin [facility]

## 2023-02-15 DIAGNOSIS — Z79.4 TYPE 2 DIABETES MELLITUS WITHOUT COMPLICATION, WITH LONG-TERM CURRENT USE OF INSULIN (HCC): ICD-10-CM

## 2023-02-15 DIAGNOSIS — E11.9 TYPE 2 DIABETES MELLITUS WITHOUT COMPLICATION, WITH LONG-TERM CURRENT USE OF INSULIN (HCC): ICD-10-CM

## 2023-02-15 RX ORDER — FLASH GLUCOSE SENSOR
KIT MISCELLANEOUS
Qty: 4 EACH | Refills: 5 | Status: SHIPPED | OUTPATIENT
Start: 2023-02-15

## 2023-02-20 LAB
ALBUMIN SERPL-MCNC: 4.6 GM/DL (ref 3.2–4.6)
ALP BLD-CCNC: 51 U/L (ref 40–129)
ALT SERPL-CCNC: 41 U/L
ANION GAP SERPL CALCULATED.3IONS-SCNC: 12 MMOL/L (ref 7–16)
AST SERPL-CCNC: 27 U/L
BILIRUB SERPL-MCNC: 0.6 MG/DL (ref 0.1–1.4)
BUN BLDV-MCNC: 12 MG/DL (ref 8–23)
CALCIUM SERPL-MCNC: 9 MG/DL (ref 8.8–10.4)
CHLORIDE BLD-SCNC: 106 MMOL/L (ref 98–107)
CO2: 25 MMOL/L (ref 22–29)
CREAT SERPL-MCNC: 0.97 MG/DL (ref 0.67–1.3)
ESTIMATED AVERAGE GLUCOSE: 143 MG/DL
GFR, ESTIMATED: 85 ML/MIN/1.73 M2
GLUCOSE BLD-MCNC: 116 MG/DL (ref 82–100)
HBA1C MFR BLD: 6.6 % (ref 4.2–5.6)
POTASSIUM SERPL-SCNC: 3.8 MMOL/L (ref 3.5–5)
PROSTATE SPECIFIC ANTIGEN: 0.54 NG/ML
SODIUM BLD-SCNC: 143 MMOL/L (ref 136–145)
TOTAL PROTEIN: 7 GM/DL (ref 6.4–8.3)

## 2023-02-25 SDOH — ECONOMIC STABILITY: FOOD INSECURITY: WITHIN THE PAST 12 MONTHS, YOU WORRIED THAT YOUR FOOD WOULD RUN OUT BEFORE YOU GOT MONEY TO BUY MORE.: NEVER TRUE

## 2023-02-25 SDOH — ECONOMIC STABILITY: FOOD INSECURITY: WITHIN THE PAST 12 MONTHS, THE FOOD YOU BOUGHT JUST DIDN'T LAST AND YOU DIDN'T HAVE MONEY TO GET MORE.: NEVER TRUE

## 2023-02-25 SDOH — ECONOMIC STABILITY: TRANSPORTATION INSECURITY
IN THE PAST 12 MONTHS, HAS LACK OF TRANSPORTATION KEPT YOU FROM MEETINGS, WORK, OR FROM GETTING THINGS NEEDED FOR DAILY LIVING?: NO

## 2023-02-25 SDOH — ECONOMIC STABILITY: HOUSING INSECURITY
IN THE LAST 12 MONTHS, WAS THERE A TIME WHEN YOU DID NOT HAVE A STEADY PLACE TO SLEEP OR SLEPT IN A SHELTER (INCLUDING NOW)?: NO

## 2023-02-25 SDOH — ECONOMIC STABILITY: INCOME INSECURITY: HOW HARD IS IT FOR YOU TO PAY FOR THE VERY BASICS LIKE FOOD, HOUSING, MEDICAL CARE, AND HEATING?: NOT HARD AT ALL

## 2023-02-28 ENCOUNTER — OFFICE VISIT (OUTPATIENT)
Dept: INTERNAL MEDICINE CLINIC | Age: 68
End: 2023-02-28
Payer: MEDICARE

## 2023-02-28 VITALS
SYSTOLIC BLOOD PRESSURE: 130 MMHG | HEART RATE: 73 BPM | WEIGHT: 198 LBS | BODY MASS INDEX: 33.8 KG/M2 | OXYGEN SATURATION: 94 % | HEIGHT: 64 IN | DIASTOLIC BLOOD PRESSURE: 78 MMHG

## 2023-02-28 DIAGNOSIS — I10 ESSENTIAL HYPERTENSION: Primary | ICD-10-CM

## 2023-02-28 DIAGNOSIS — E11.9 TYPE 2 DIABETES MELLITUS WITHOUT COMPLICATION, WITH LONG-TERM CURRENT USE OF INSULIN (HCC): ICD-10-CM

## 2023-02-28 DIAGNOSIS — M48.062 SPINAL STENOSIS OF LUMBAR REGION WITH NEUROGENIC CLAUDICATION: ICD-10-CM

## 2023-02-28 DIAGNOSIS — E78.5 HYPERLIPIDEMIA LDL GOAL <70: ICD-10-CM

## 2023-02-28 DIAGNOSIS — Z79.4 TYPE 2 DIABETES MELLITUS WITHOUT COMPLICATION, WITH LONG-TERM CURRENT USE OF INSULIN (HCC): ICD-10-CM

## 2023-02-28 PROCEDURE — 3075F SYST BP GE 130 - 139MM HG: CPT | Performed by: INTERNAL MEDICINE

## 2023-02-28 PROCEDURE — 1123F ACP DISCUSS/DSCN MKR DOCD: CPT | Performed by: INTERNAL MEDICINE

## 2023-02-28 PROCEDURE — 3044F HG A1C LEVEL LT 7.0%: CPT | Performed by: INTERNAL MEDICINE

## 2023-02-28 PROCEDURE — 3078F DIAST BP <80 MM HG: CPT | Performed by: INTERNAL MEDICINE

## 2023-02-28 PROCEDURE — 99214 OFFICE O/P EST MOD 30 MIN: CPT | Performed by: INTERNAL MEDICINE

## 2023-02-28 RX ORDER — DICLOFENAC SODIUM 75 MG/1
75 TABLET, DELAYED RELEASE ORAL 2 TIMES DAILY
Qty: 60 TABLET | Refills: 3 | Status: SHIPPED | OUTPATIENT
Start: 2023-02-28

## 2023-02-28 SDOH — ECONOMIC STABILITY: INCOME INSECURITY: HOW HARD IS IT FOR YOU TO PAY FOR THE VERY BASICS LIKE FOOD, HOUSING, MEDICAL CARE, AND HEATING?: NOT HARD AT ALL

## 2023-02-28 SDOH — ECONOMIC STABILITY: FOOD INSECURITY: WITHIN THE PAST 12 MONTHS, THE FOOD YOU BOUGHT JUST DIDN'T LAST AND YOU DIDN'T HAVE MONEY TO GET MORE.: NEVER TRUE

## 2023-02-28 SDOH — ECONOMIC STABILITY: FOOD INSECURITY: WITHIN THE PAST 12 MONTHS, YOU WORRIED THAT YOUR FOOD WOULD RUN OUT BEFORE YOU GOT MONEY TO BUY MORE.: NEVER TRUE

## 2023-02-28 ASSESSMENT — PATIENT HEALTH QUESTIONNAIRE - PHQ9
7. TROUBLE CONCENTRATING ON THINGS, SUCH AS READING THE NEWSPAPER OR WATCHING TELEVISION: 0
5. POOR APPETITE OR OVEREATING: 0
SUM OF ALL RESPONSES TO PHQ QUESTIONS 1-9: 0
SUM OF ALL RESPONSES TO PHQ QUESTIONS 1-9: 0
6. FEELING BAD ABOUT YOURSELF - OR THAT YOU ARE A FAILURE OR HAVE LET YOURSELF OR YOUR FAMILY DOWN: 0
SUM OF ALL RESPONSES TO PHQ QUESTIONS 1-9: 0
4. FEELING TIRED OR HAVING LITTLE ENERGY: 0
1. LITTLE INTEREST OR PLEASURE IN DOING THINGS: 0
SUM OF ALL RESPONSES TO PHQ QUESTIONS 1-9: 0
SUM OF ALL RESPONSES TO PHQ9 QUESTIONS 1 & 2: 0
2. FEELING DOWN, DEPRESSED OR HOPELESS: 0
9. THOUGHTS THAT YOU WOULD BE BETTER OFF DEAD, OR OF HURTING YOURSELF: 0
3. TROUBLE FALLING OR STAYING ASLEEP: 0
10. IF YOU CHECKED OFF ANY PROBLEMS, HOW DIFFICULT HAVE THESE PROBLEMS MADE IT FOR YOU TO DO YOUR WORK, TAKE CARE OF THINGS AT HOME, OR GET ALONG WITH OTHER PEOPLE: 0
8. MOVING OR SPEAKING SO SLOWLY THAT OTHER PEOPLE COULD HAVE NOTICED. OR THE OPPOSITE, BEING SO FIGETY OR RESTLESS THAT YOU HAVE BEEN MOVING AROUND A LOT MORE THAN USUAL: 0

## 2023-02-28 ASSESSMENT — ENCOUNTER SYMPTOMS
SHORTNESS OF BREATH: 0
ABDOMINAL PAIN: 0

## 2023-02-28 NOTE — ASSESSMENT & PLAN NOTE
Very well controlled with hemoglobin A1c of 6.6%. Still having low blood sugars but much less frequently since discontinuing amlodipine. Continue Humalog 75/25 mix 34 units twice daily with meals, metformin ER 1500 mg daily, Farxiga 10 mg daily, and Trulicity 3 mg weekly.

## 2023-02-28 NOTE — PROGRESS NOTES
Patient: Reagan Reinoso is a 69 y.o. male who presents today with the following Chief Complaint(s):  Chief Complaint   Patient presents with    Follow-up       HPI    Here today for follow up. Doing well. DM- Pioglitazone was stopped at his last visit. Is still  having occasional low blood sugars- 9 episodes in 90 days, 2 in the past 7 days. Sugars averaging 104-153, highest sugars around midnight, lowest around 3 am.     Will have episodes of lightheadedness and will has some visual field loss. ( Will check blood sugars and are not low. Has not checked BP with episodes. No CP. No SOB. Has had 2 episodes in the past month. Last eye exam was in December. Does eat when happens and sx resolve in about 5-10 minutes. HLD- no issues. HTN- BP is great. Has not been walking as much- having some increase in back pain. Does take Aleve at night which does help. Results for orders placed or performed in visit on 02/20/23   PSA Screening   Result Value Ref Range    PSA 0.54 <=4.00 ng/mL      Lab Results   Component Value Date     02/20/2023    K 3.8 02/20/2023     02/20/2023    CO2 25 02/20/2023    BUN 12 02/20/2023    CREATININE 0.97 02/20/2023    GLUCOSE 116 (H) 02/20/2023    CALCIUM 9.0 02/20/2023    PROT 7.0 02/20/2023    LABALBU 4.6 02/20/2023    BILITOT 0.6 02/20/2023    ALKPHOS 51 02/20/2023    AST 27 02/20/2023    ALT 41 02/20/2023    GFRAA >60 11/21/2012       Hemoglobin A1C   Date Value Ref Range Status   02/20/2023 6.6 (H) 4.2 - 5.6 % Final     Lab Results   Component Value Date    LABA1C 6.6 (H) 02/20/2023    LABA1C 6.1 (H) 10/21/2022    LABA1C 7.1 (H) 07/20/2022     Lab Results   Component Value Date    LABMICR 129.7 07/28/2021    CREATININE 0.97 02/20/2023     Cholesterol   Triglyceride   HDL   LDL Calculated   Non-HDL-C Calculated   Fasting Specimen?    VLDL   Chol/HDL Ratio   Component 02/20/202320/2023 07/20/2022 04/06/2022 12/02/2021 07/28/2021 01/11/2021              Cholesterol 104 95 159 152 147 139 Load older lab results   Triglyceride 96 77 182 High      257 High      106 127 Load older lab results   HDL 45 41 46 42 49 40 Load older lab results   LDL Calculated 41 38 82 69 77 74 Load older lab results   Non-HDL-C Calculated 59 54 113 110 98 -- Load older lab results   Fasting Specimen? Yes               No Known Allergies   Past Medical History:   Diagnosis Date    Appendiceal tumor     Asthma     Cervical spinal stenosis     Degenerative disc disease, lumbar     history of herniated disc at L4-5 with L5 radiculopathy. pt had L4-5 discectomy with Dr Reeves    Diabetes mellitus (HCC)     Esophageal reflux     Gout     Gross hematuria     Hand cramp     Hemorrhoid     Hepatitis     High blood pressure     Hyperlipidemia     Lumbar herniated disc     Major depressive disorder, recurrent episode, mild (HCC)     Postnasal drip     Restless leg syndrome     Seasonal allergies     Sleep apnea     Unilateral hearing loss       Past Surgical History:   Procedure Laterality Date    APPENDECTOMY  May 5, 2008    BACK SURGERY      [facility]    NECK SURGERY      [facility]      Social History     Socioeconomic History    Marital status:      Spouse name: Not on file    Number of children: Not on file    Years of education: Not on file    Highest education level: Not on file   Occupational History    Occupation:    Tobacco Use    Smoking status: Never    Smokeless tobacco: Never   Substance and Sexual Activity    Alcohol use: Yes     Alcohol/week: 2.0 - 7.0 standard drinks     Types: 2 - 7 Cans of beer per week    Drug use: No    Sexual activity: Not on file   Other Topics Concern    Not on file   Social History Narrative    Lives with sister in law.      Social Determinants of Health     Financial Resource Strain: Low Risk     Difficulty of Paying Living Expenses: Not hard at all   Food Insecurity: No Food Insecurity    Worried About Running Out of Food  in the Last Year: Never true    0 [facility] in the Last Year: Never true   Transportation Needs: Unknown    Lack of Transportation (Medical): Not on file    Lack of Transportation (Non-Medical):  No   Physical Activity: Sufficiently Active    Days of Exercise per Week: 5 days    Minutes of Exercise per Session: 50 min   Stress: Not on file   Social Connections: Not on file   Intimate Partner Violence: Not on file   Housing Stability: Unknown    Unable to Pay for Housing in the Last Year: Not on file    Number of Places Lived in the Last Year: Not on file    Unstable Housing in the Last Year: No     Family History   Problem Relation Age of Onset    Cancer Mother     Cancer Father     Heart Disease Father     High Blood Pressure Father     Diabetes Sister         Outpatient Medications Prior to Visit   Medication Sig Dispense Refill    Continuous Blood Gluc Sensor (FREESTYLE AVA 2 SENSOR) MISC APPLY ONE SENSOR EVERY 14 DAYS** AND REMOVE OLD ONE** 4 each 5    tadalafil (CIALIS) 5 MG tablet Take 1 tablet by mouth daily 90 tablet 0    insulin lispro protamine & lispro (HUMALOG MIX) (75-25) 100 UNIT per ML SUPN injection pen ADMINISTER 0.34 ML(25.5 UNITS OF LISPRO PROTAMINE AND 8.5 UNITS OF LISPRO) UNDER THE SKIN TWICE DAILY WITH MEALS 15 mL 5    FARXIGA 10 MG tablet TAKE 1 TABLET BY MOUTH EVERY MORNING 90 tablet 1    Dulaglutide 3 MG/0.5ML SOPN Inject 3 mg into the skin once a week 12 pen 3    ezetimibe (ZETIA) 10 MG tablet Take 1 tablet by mouth daily 90 tablet 3    Insulin Pen Needle 32G X 4 MM MISC 1 each by Does not apply route 2 times daily 200 each 3    metFORMIN (GLUCOPHAGE-XR) 750 MG extended release tablet Take 1 tablet by mouth daily (with breakfast) 180 tablet 3    rosuvastatin (CRESTOR) 40 MG tablet Take 1 tablet by mouth daily 90 tablet 3    omeprazole (PRILOSEC) 40 MG delayed release capsule Take 1 capsule by mouth daily 90 capsule 3    valsartan (DIOVAN) 320 MG tablet Take 1 tablet by mouth daily 90 tablet 3    Continuous Blood Gluc  (FREESTYLE AVA 2 READER) SERAFIN 1 each by Does not apply route 4 times daily (before meals and nightly) 1 each 0    blood glucose monitor strips Test 2 times a day & as needed for symptoms of irregular blood glucose. Test strips covered by patients insurance. DX e11.9 (Patient taking differently: Test 2 times a day & as needed for symptoms of irregular blood glucose. Test strips covered by patients insurance. DX e11.9    Inconsistent testing. Travels for work and doesn't always take with him.) 100 strip 0    calcium carbonate 600 MG TABS tablet Take 1 tablet by mouth daily       No facility-administered medications prior to visit. Patient'spast medical history, surgical history, family history, medications,  and allergies  were all reviewed and updated as appropriate today. Review of Systems   Constitutional:  Negative for appetite change and unexpected weight change. Eyes:  Negative for visual disturbance. Respiratory:  Negative for shortness of breath. Cardiovascular:  Negative for chest pain. Gastrointestinal:  Negative for abdominal pain. Endocrine: Negative for cold intolerance, heat intolerance, polydipsia, polyphagia and polyuria. Occasional low blood sugars     /78   Pulse 73   Ht 5' 4\" (1.626 m)   Wt 198 lb (89.8 kg)   SpO2 94%   BMI 33.99 kg/m²   Physical Exam  Vitals and nursing note reviewed. Constitutional:       Appearance: He is well-developed. He is not toxic-appearing. HENT:      Head: Normocephalic. Right Ear: Tympanic membrane, ear canal and external ear normal.      Left Ear: Tympanic membrane, ear canal and external ear normal.      Mouth/Throat:      Pharynx: No oropharyngeal exudate or posterior oropharyngeal erythema. Eyes:      General: No scleral icterus. Extraocular Movements: Extraocular movements intact.       Conjunctiva/sclera: Conjunctivae normal.      Pupils: Pupils are equal, round, and reactive to light. Neck:      Thyroid: No thyroid mass or thyromegaly. Vascular: No carotid bruit. Cardiovascular:      Rate and Rhythm: Normal rate and regular rhythm. Heart sounds: Normal heart sounds. No murmur heard. Pulmonary:      Effort: Pulmonary effort is normal.      Breath sounds: Normal breath sounds. Musculoskeletal:      Right lower leg: No edema. Left lower leg: No edema. Lymphadenopathy:      Cervical: No cervical adenopathy. Neurological:      General: No focal deficit present. Mental Status: He is alert and oriented to person, place, and time. Psychiatric:         Mood and Affect: Mood normal.         Behavior: Behavior normal. Behavior is cooperative. ASSESSMENT/PLAN:    Problem List Items Addressed This Visit       Essential hypertension - Primary      Controlled. Continue valsartan 320 mg daily. Hyperlipidemia LDL goal <70      Well-controlled. Continue Crestor 40 mg daily and Zetia 10 mg daily. Lumbar stenosis      Add diclofenac 75 mg twice daily as needed. Advised may take Tylenol with diclofenac as well. If he does feel that he needs pain medicine for occasional worsening pain will let me know. Type 2 diabetes mellitus without complication, with long-term current use of insulin ([facility])      Very well controlled with hemoglobin A1c of 6.6%. Still having low blood sugars but much less frequently since discontinuing amlodipine. Continue Humalog 75/25 mix 34 units twice daily with meals, metformin ER 1500 mg daily, Farxiga 10 mg daily, and Trulicity 3 mg weekly.          Relevant Orders    Hemoglobin A1C    Comprehensive Metabolic Panel       Current Outpatient Medications   Medication Sig Dispense Refill    diclofenac (VOLTAREN) 75 MG EC tablet Take 1 tablet by mouth 2 times daily 60 tablet 3    Continuous Blood Gluc Sensor (FREESTYLE AVA 2 SENSOR) MISC APPLY ONE SENSOR EVERY 14 DAYS** AND REMOVE OLD ONE** 4 each 5    tadalafil (CIALIS) 5 MG tablet Take 1 tablet by mouth daily 90 tablet 0    insulin lispro protamine & lispro (HUMALOG MIX) (75-25) 100 UNIT per ML SUPN injection pen ADMINISTER 0.34 ML(25.5 UNITS OF LISPRO PROTAMINE AND 8.5 UNITS OF LISPRO) UNDER THE SKIN TWICE DAILY WITH MEALS 15 mL 5    FARXIGA 10 MG tablet TAKE 1 TABLET BY MOUTH EVERY MORNING 90 tablet 1    Dulaglutide 3 MG/0.5ML SOPN Inject 3 mg into the skin once a week 12 pen 3    ezetimibe (ZETIA) 10 MG tablet Take 1 tablet by mouth daily 90 tablet 3    Insulin Pen Needle 32G X 4 MM MISC 1 each by Does not apply route 2 times daily 200 each 3    metFORMIN (GLUCOPHAGE-XR) 750 MG extended release tablet Take 1 tablet by mouth daily (with breakfast) 180 tablet 3    rosuvastatin (CRESTOR) 40 MG tablet Take 1 tablet by mouth daily 90 tablet 3    omeprazole (PRILOSEC) 40 MG delayed release capsule Take 1 capsule by mouth daily 90 capsule 3    valsartan (DIOVAN) 320 MG tablet Take 1 tablet by mouth daily 90 tablet 3    Continuous Blood Gluc  (FREESTYLE AVA 2 READER) SERAFIN 1 each by Does not apply route 4 times daily (before meals and nightly) 1 each 0    blood glucose monitor strips Test 2 times a day & as needed for symptoms of irregular blood glucose. Test strips covered by patients insurance. DX e11.9 (Patient taking differently: Test 2 times a day & as needed for symptoms of irregular blood glucose. Test strips covered by patients insurance. DX e11.9    Inconsistent testing. Travels for work and doesn't always take with him.) 100 strip 0    calcium carbonate 600 MG TABS tablet Take 1 tablet by mouth daily       No current facility-administered medications for this visit. Return in about 4 months (around 6/28/2023).

## 2023-02-28 NOTE — ASSESSMENT & PLAN NOTE
Add diclofenac 75 mg twice daily as needed. Advised may take Tylenol with diclofenac as well. If he does feel that he needs pain medicine for occasional worsening pain will let me know.

## 2023-03-25 ENCOUNTER — PATIENT MESSAGE (OUTPATIENT)
Dept: INTERNAL MEDICINE CLINIC | Age: 68
End: 2023-03-25

## 2023-03-25 DIAGNOSIS — E11.9 TYPE 2 DIABETES MELLITUS WITHOUT COMPLICATION, WITH LONG-TERM CURRENT USE OF INSULIN (HCC): Primary | ICD-10-CM

## 2023-03-25 DIAGNOSIS — Z79.4 TYPE 2 DIABETES MELLITUS WITHOUT COMPLICATION, WITH LONG-TERM CURRENT USE OF INSULIN (HCC): Primary | ICD-10-CM

## 2023-03-27 RX ORDER — OMEPRAZOLE 40 MG/1
40 CAPSULE, DELAYED RELEASE ORAL DAILY
Qty: 90 CAPSULE | Refills: 3 | Status: SHIPPED | OUTPATIENT
Start: 2023-03-27

## 2023-03-27 RX ORDER — PEN NEEDLE, DIABETIC 32GX 5/32"
NEEDLE, DISPOSABLE MISCELLANEOUS
Qty: 200 EACH | Refills: 3 | Status: SHIPPED | OUTPATIENT
Start: 2023-03-27

## 2023-03-30 RX ORDER — SEMAGLUTIDE 1.34 MG/ML
1 INJECTION, SOLUTION SUBCUTANEOUS WEEKLY
Qty: 3 ML | Refills: 5 | Status: SHIPPED | OUTPATIENT
Start: 2023-03-30

## 2023-03-30 NOTE — TELEPHONE ENCOUNTER
From: Shara Alpers Horton  To: Dr. Jeremias Lyman: 3/25/2023 3:11 PM EDT  Subject: Trulicity    Dr. Kimberly Davidson  I am afraid I will have to stop taking Trulicity because of the cost. My insurance has cut back on the amount they will pay for a 4 week or 1 box supply. It has raised to over $200. My cost per month and I can not afford that anymore. It is now over $700. for a 3 month supply. I just wanted you to know. Thanks  [facility].

## 2023-04-03 RX ORDER — EZETIMIBE 10 MG/1
10 TABLET ORAL DAILY
Qty: 90 TABLET | Refills: 3 | Status: SHIPPED | OUTPATIENT
Start: 2023-04-03

## 2023-04-04 RX ORDER — ROSUVASTATIN CALCIUM 40 MG/1
40 TABLET, COATED ORAL DAILY
Qty: 90 TABLET | Refills: 1 | Status: SHIPPED | OUTPATIENT
Start: 2023-04-04

## 2023-04-04 RX ORDER — DAPAGLIFLOZIN 10 MG/1
TABLET, FILM COATED ORAL
Qty: 90 TABLET | Refills: 1 | Status: SHIPPED | OUTPATIENT
Start: 2023-04-04

## 2023-04-04 RX ORDER — VALSARTAN 320 MG/1
320 TABLET ORAL DAILY
Qty: 90 TABLET | Refills: 1 | Status: SHIPPED | OUTPATIENT
Start: 2023-04-04

## 2023-04-19 RX ORDER — TADALAFIL 5 MG/1
5 TABLET ORAL DAILY
Qty: 90 TABLET | Refills: 0 | Status: SHIPPED | OUTPATIENT
Start: 2023-04-19

## 2023-05-05 RX ORDER — INSULIN LISPRO 100 [IU]/ML
INJECTION, SUSPENSION SUBCUTANEOUS
Qty: 15 ML | Refills: 0 | Status: SHIPPED | OUTPATIENT
Start: 2023-05-05

## 2023-06-06 DIAGNOSIS — Z79.4 TYPE 2 DIABETES MELLITUS WITHOUT COMPLICATION, WITH LONG-TERM CURRENT USE OF INSULIN (HCC): ICD-10-CM

## 2023-06-06 DIAGNOSIS — E11.9 TYPE 2 DIABETES MELLITUS WITHOUT COMPLICATION, WITH LONG-TERM CURRENT USE OF INSULIN (HCC): ICD-10-CM

## 2023-06-06 RX ORDER — DULAGLUTIDE 1.5 MG/.5ML
INJECTION, SOLUTION SUBCUTANEOUS
Qty: 2 ML | Refills: 1 | OUTPATIENT
Start: 2023-06-06

## 2023-06-19 RX ORDER — METFORMIN HYDROCHLORIDE 750 MG/1
TABLET, EXTENDED RELEASE ORAL
Qty: 180 TABLET | Refills: 0 | Status: SHIPPED | OUTPATIENT
Start: 2023-06-19

## 2023-06-26 LAB
ALBUMIN SERPL-MCNC: 4.5 GM/DL (ref 3.2–4.6)
ALP BLD-CCNC: 48 U/L (ref 40–129)
ALT SERPL-CCNC: 34 U/L
ANION GAP SERPL CALCULATED.3IONS-SCNC: 10 MMOL/L (ref 7–16)
AST SERPL-CCNC: 27 U/L
BILIRUB SERPL-MCNC: 0.4 MG/DL (ref 0.1–1.4)
BUN BLDV-MCNC: 21 MG/DL (ref 8–23)
CALCIUM SERPL-MCNC: 9.4 MG/DL (ref 8.8–10.4)
CHLORIDE BLD-SCNC: 103 MMOL/L (ref 98–107)
CO2: 25 MMOL/L (ref 22–29)
CREAT SERPL-MCNC: 1.15 MG/DL (ref 0.67–1.3)
ESTIMATED AVERAGE GLUCOSE: 174 MG/DL
GFR, ESTIMATED: 68 ML/MIN/1.73 M2
GLUCOSE BLD-MCNC: 190 MG/DL (ref 82–100)
HBA1C MFR BLD: 7.7 % (ref 4.2–5.6)
POTASSIUM SERPL-SCNC: 4.8 MMOL/L (ref 3.5–5)
SODIUM BLD-SCNC: 138 MMOL/L (ref 136–145)
TOTAL PROTEIN: 7 GM/DL (ref 6.4–8.3)

## 2023-06-30 ENCOUNTER — OFFICE VISIT (OUTPATIENT)
Dept: INTERNAL MEDICINE CLINIC | Age: 68
End: 2023-06-30
Payer: MEDICARE

## 2023-06-30 VITALS
SYSTOLIC BLOOD PRESSURE: 118 MMHG | OXYGEN SATURATION: 98 % | HEART RATE: 62 BPM | BODY MASS INDEX: 34.04 KG/M2 | WEIGHT: 199.4 LBS | DIASTOLIC BLOOD PRESSURE: 64 MMHG | HEIGHT: 64 IN

## 2023-06-30 DIAGNOSIS — M48.062 SPINAL STENOSIS OF LUMBAR REGION WITH NEUROGENIC CLAUDICATION: ICD-10-CM

## 2023-06-30 DIAGNOSIS — Z79.4 TYPE 2 DIABETES MELLITUS WITHOUT COMPLICATION, WITH LONG-TERM CURRENT USE OF INSULIN (HCC): Primary | ICD-10-CM

## 2023-06-30 DIAGNOSIS — E11.9 TYPE 2 DIABETES MELLITUS WITHOUT COMPLICATION, WITH LONG-TERM CURRENT USE OF INSULIN (HCC): Primary | ICD-10-CM

## 2023-06-30 DIAGNOSIS — I10 ESSENTIAL HYPERTENSION: ICD-10-CM

## 2023-06-30 PROCEDURE — 3074F SYST BP LT 130 MM HG: CPT | Performed by: INTERNAL MEDICINE

## 2023-06-30 PROCEDURE — 3051F HG A1C>EQUAL 7.0%<8.0%: CPT | Performed by: INTERNAL MEDICINE

## 2023-06-30 PROCEDURE — 99214 OFFICE O/P EST MOD 30 MIN: CPT | Performed by: INTERNAL MEDICINE

## 2023-06-30 PROCEDURE — 1123F ACP DISCUSS/DSCN MKR DOCD: CPT | Performed by: INTERNAL MEDICINE

## 2023-06-30 PROCEDURE — 3078F DIAST BP <80 MM HG: CPT | Performed by: INTERNAL MEDICINE

## 2023-06-30 RX ORDER — INSULIN LISPRO 100 [IU]/ML
INJECTION, SUSPENSION SUBCUTANEOUS
Qty: 15 ML | Refills: 3 | Status: SHIPPED | OUTPATIENT
Start: 2023-06-30

## 2023-06-30 RX ORDER — HYDROCODONE BITARTRATE AND ACETAMINOPHEN 5; 325 MG/1; MG/1
1 TABLET ORAL EVERY 8 HOURS PRN
Qty: 60 TABLET | Refills: 0 | Status: SHIPPED | OUTPATIENT
Start: 2023-06-30 | End: 2023-07-30

## 2023-06-30 RX ORDER — INSULIN LISPRO 100 [IU]/ML
INJECTION, SUSPENSION SUBCUTANEOUS
Qty: 15 ML | Refills: 3 | Status: SHIPPED | OUTPATIENT
Start: 2023-06-30 | End: 2023-06-30

## 2023-07-04 NOTE — ASSESSMENT & PLAN NOTE
Would like to resume Trulicity as he has noticed that his sugars are not as well controlled. Trulicity was discontinued after his last appointment due to cost.  We will see if he qualifies for any financial assistance with Trulicity and [facility] as both are quite sensitive. For now, he should increase Humalog 75/25 mix 40 units twice daily (decreased back down to 34 units twice daily if he does resume Trulicity), metformin ER 15 mg daily, and Farxiga 10 mg daily.

## 2023-07-04 NOTE — ASSESSMENT & PLAN NOTE
Continue diclofenac 75 mg twice daily with Tylenol as needed. Norco refilled (which was last refilled in 2015) for episodes of more severe pain.

## 2023-07-05 ENCOUNTER — TELEPHONE (OUTPATIENT)
Dept: INTERNAL MEDICINE CLINIC | Age: 68
End: 2023-07-05

## 2023-07-05 RX ORDER — DAPAGLIFLOZIN 10 MG/1
TABLET, FILM COATED ORAL
Qty: 90 TABLET | Refills: 1 | Status: SHIPPED | OUTPATIENT
Start: 2023-07-05

## 2023-07-05 NOTE — TELEPHONE ENCOUNTER
Patient needing assistance with Trulicity and [facility]ria medication. [place] called to see best way to get paperwork patient prefers e-mail. Applications for both medications emailed to patient. Patient knows to return paperwork to PCP office once patient portion complete, PCP office will fax in final copy. Patient also educated about Aria patient savings card that can be checked to see if qualifies from medication web site. Patient confirms understanding of all information and instructions.

## 2023-07-06 RX ORDER — ROSUVASTATIN CALCIUM 40 MG/1
40 TABLET, COATED ORAL DAILY
Qty: 90 TABLET | Refills: 1 | Status: SHIPPED | OUTPATIENT
Start: 2023-07-06

## 2023-08-02 RX ORDER — DICLOFENAC SODIUM 75 MG/1
TABLET, DELAYED RELEASE ORAL
Qty: 60 TABLET | Refills: 5 | Status: SHIPPED | OUTPATIENT
Start: 2023-08-02

## 2023-08-07 RX ORDER — TADALAFIL 5 MG/1
5 TABLET ORAL DAILY
Qty: 90 TABLET | Refills: 1 | Status: SHIPPED | OUTPATIENT
Start: 2023-08-07

## 2023-09-27 RX ORDER — METFORMIN HYDROCHLORIDE 750 MG/1
TABLET, EXTENDED RELEASE ORAL
Qty: 90 TABLET | Refills: 1 | Status: SHIPPED | OUTPATIENT
Start: 2023-09-27

## 2023-10-10 RX ORDER — VALSARTAN 320 MG/1
320 TABLET ORAL DAILY
Qty: 90 TABLET | Refills: 1 | Status: SHIPPED | OUTPATIENT
Start: 2023-10-10

## 2023-10-25 LAB
ALBUMIN SERPL-MCNC: 4.5 GM/DL (ref 3.2–4.6)
ALP BLD-CCNC: 49 U/L (ref 40–129)
ALT SERPL-CCNC: 45 U/L
ANION GAP SERPL CALCULATED.3IONS-SCNC: 13 MMOL/L (ref 7–16)
AST SERPL-CCNC: 34 U/L
BASOPHILS ABSOLUTE: 0 X10(3)/MCL (ref 0–0.1)
BASOPHILS RELATIVE PERCENT: 0.5 %
BILIRUB SERPL-MCNC: 0.8 MG/DL (ref 0.2–1.4)
BUN BLDV-MCNC: 22 MG/DL (ref 8–23)
CALCIUM SERPL-MCNC: 9.6 MG/DL (ref 8.8–10.4)
CHLORIDE BLD-SCNC: 100 MMOL/L (ref 98–107)
CO2: 25 MMOL/L (ref 22–29)
CREAT SERPL-MCNC: 1.04 MG/DL (ref 0.67–1.3)
CREATININE URINE: 124.5 MG/DL
EOSINOPHILS ABSOLUTE: 0.2 X10(3)/MCL (ref 0–0.5)
EOSINOPHILS RELATIVE PERCENT: 2.9 %
ESTIMATED AVERAGE GLUCOSE: 177 MG/DL
GFR, ESTIMATED: 77 ML/MIN/1.73 M2
GLUCOSE BLD-MCNC: 122 MG/DL (ref 82–100)
GRANULOCYTE IMMATURE ABS: 0 X10(3)/MCL (ref 0–0.1)
HBA1C MFR BLD: 7.8 % (ref 4.2–5.6)
HCT VFR BLD CALC: 45.7 % (ref 40–51)
HEMOGLOBIN: 15 G/DL (ref 13.7–17.5)
IMMATURE GRANULOCYTES: 0.2 %
LYMPHOCYTES ABSOLUTE: 2.5 X10(3)/MCL (ref 1.2–3.9)
LYMPHOCYTES ABSOLUTE: 42.9 %
MCH RBC QN AUTO: 29.8 PG (ref 26–34)
MCHC RBC AUTO-ENTMCNC: 32.8 G/DL (ref 30.7–35.5)
MCV RBC AUTO: 90.7 FL (ref 80–100)
MICROALBUMIN UR-MCNC: NORMAL MG/L
MICROALBUMIN/CREAT UR-RTO: NORMAL
MONOCYTES # BLD: 8.9 %
MONOCYTES ABSOLUTE: 0.5 X10(3)/MCL (ref 0.3–0.9)
NEUTROPHILS ABSOLUTE: 2.6 X10(3)/MCL (ref 1.6–6.1)
PDW BLD-RTO: 12.8 %
PLATELET # BLD: 241 X10(3)/MCL (ref 155–369)
PMV BLD AUTO: 9.9 FL (ref 8.8–12.5)
POTASSIUM SERPL-SCNC: 4.3 MMOL/L (ref 3.5–5)
RBC # BLD: 5.04 X10(6)/MCL (ref 4.6–6.1)
SEGMENTED NEUTROPHILS RELATIVE PERCENT: 44.6 %
SODIUM BLD-SCNC: 138 MMOL/L (ref 136–145)
TOTAL PROTEIN: 7.5 GM/DL (ref 6.4–8.3)
WBC # BLD: 5.9 X10(3)/MCL (ref 3.7–10.3)

## 2023-10-26 RX ORDER — INSULIN LISPRO 100 [IU]/ML
INJECTION, SUSPENSION SUBCUTANEOUS
Qty: 15 ML | Refills: 3 | Status: SHIPPED | OUTPATIENT
Start: 2023-10-26

## 2023-10-30 ENCOUNTER — OFFICE VISIT (OUTPATIENT)
Dept: INTERNAL MEDICINE CLINIC | Age: 68
End: 2023-10-30
Payer: MEDICARE

## 2023-10-30 VITALS
WEIGHT: 202 LBS | SYSTOLIC BLOOD PRESSURE: 118 MMHG | DIASTOLIC BLOOD PRESSURE: 78 MMHG | HEART RATE: 85 BPM | OXYGEN SATURATION: 95 % | BODY MASS INDEX: 34.67 KG/M2

## 2023-10-30 DIAGNOSIS — G47.33 OSA (OBSTRUCTIVE SLEEP APNEA): ICD-10-CM

## 2023-10-30 DIAGNOSIS — Z86.010 HISTORY OF COLON POLYPS: ICD-10-CM

## 2023-10-30 DIAGNOSIS — M54.12 CERVICAL RADICULAR PAIN: ICD-10-CM

## 2023-10-30 DIAGNOSIS — E11.9 TYPE 2 DIABETES MELLITUS WITHOUT COMPLICATION, WITH LONG-TERM CURRENT USE OF INSULIN (HCC): ICD-10-CM

## 2023-10-30 DIAGNOSIS — Z71.85 VACCINE COUNSELING: ICD-10-CM

## 2023-10-30 DIAGNOSIS — Z79.4 TYPE 2 DIABETES MELLITUS WITHOUT COMPLICATION, WITH LONG-TERM CURRENT USE OF INSULIN (HCC): ICD-10-CM

## 2023-10-30 DIAGNOSIS — Z12.11 COLON CANCER SCREENING: ICD-10-CM

## 2023-10-30 DIAGNOSIS — M51.36 DEGENERATIVE DISC DISEASE, LUMBAR: ICD-10-CM

## 2023-10-30 DIAGNOSIS — E78.5 HYPERLIPIDEMIA LDL GOAL <70: ICD-10-CM

## 2023-10-30 DIAGNOSIS — M48.062 SPINAL STENOSIS OF LUMBAR REGION WITH NEUROGENIC CLAUDICATION: ICD-10-CM

## 2023-10-30 DIAGNOSIS — D37.3 APPENDICEAL TUMOR: Primary | ICD-10-CM

## 2023-10-30 DIAGNOSIS — I10 ESSENTIAL HYPERTENSION: ICD-10-CM

## 2023-10-30 PROBLEM — Z02.71 DISABILITY EXAMINATION: Status: RESOLVED | Noted: 2018-03-03 | Resolved: 2023-10-30

## 2023-10-30 PROBLEM — F43.21 GRIEF: Status: RESOLVED | Noted: 2018-02-28 | Resolved: 2023-10-30

## 2023-10-30 PROBLEM — Z79.899 HIGH RISK MEDICATION USE: Status: RESOLVED | Noted: 2018-02-28 | Resolved: 2023-10-30

## 2023-10-30 PROBLEM — Z91.148 NON COMPLIANCE W MEDICATION REGIMEN: Status: RESOLVED | Noted: 2018-08-12 | Resolved: 2023-10-30

## 2023-10-30 PROBLEM — Z86.0100 HISTORY OF COLON POLYPS: Status: ACTIVE | Noted: 2023-10-30

## 2023-10-30 PROCEDURE — 3051F HG A1C>EQUAL 7.0%<8.0%: CPT | Performed by: INTERNAL MEDICINE

## 2023-10-30 PROCEDURE — 3074F SYST BP LT 130 MM HG: CPT | Performed by: INTERNAL MEDICINE

## 2023-10-30 PROCEDURE — 3078F DIAST BP <80 MM HG: CPT | Performed by: INTERNAL MEDICINE

## 2023-10-30 PROCEDURE — 99214 OFFICE O/P EST MOD 30 MIN: CPT | Performed by: INTERNAL MEDICINE

## 2023-10-30 PROCEDURE — 1123F ACP DISCUSS/DSCN MKR DOCD: CPT | Performed by: INTERNAL MEDICINE

## 2023-10-30 RX ORDER — HYDROCODONE BITARTRATE AND ACETAMINOPHEN 5; 325 MG/1; MG/1
1 TABLET ORAL EVERY 8 HOURS PRN
Qty: 21 TABLET | Refills: 0 | Status: SHIPPED | OUTPATIENT
Start: 2023-10-30 | End: 2023-11-06

## 2023-10-30 NOTE — PATIENT INSTRUCTIONS
Continue Humalog 75/25 mix 40 units in the mornings with breakfast and 42 units with dinner.      Recommended vaccines:  COVID- now  RSV vaccine- once in Nov/Dec time frame

## 2023-11-08 RX ORDER — TADALAFIL 5 MG/1
5 TABLET ORAL DAILY
Qty: 90 TABLET | Refills: 1 | Status: SHIPPED | OUTPATIENT
Start: 2023-11-08

## 2023-11-11 ASSESSMENT — ENCOUNTER SYMPTOMS
CHEST TIGHTNESS: 0
CONSTIPATION: 0
SHORTNESS OF BREATH: 0
DIARRHEA: 0

## 2023-11-11 NOTE — ASSESSMENT & PLAN NOTE
Worsening control with hemoglobin A1c of 7.8%  since discontinuing Trulicity due to cost.  Resume Januvia 10 mg daily. Continue Farxiga 10 mg daily, metformin ER 1500 mg daily, and Humalog 75/25 mix 40 units with breakfast and increase to 42 units with dinner.

## 2023-11-11 NOTE — ASSESSMENT & PLAN NOTE
Not using CPAP. Discussed referral to sleep medicine to get official diagnosis and official machine as previous work-up was done through a friend's sibling. Declines referral at this time.

## 2023-11-11 NOTE — ASSESSMENT & PLAN NOTE
Well-controlled with LDL of 62 and triglycerides of 108. Continue Crestor 40 mg daily and Zetia 10 mg daily.

## 2023-11-11 NOTE — ASSESSMENT & PLAN NOTE
Relies primarily on Tylenol and Aleve for pain and takes Norco very infrequently. Norco refilled today.

## 2023-11-11 NOTE — ASSESSMENT & PLAN NOTE
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 Underwent resection with Dr. Charles Mejia. Due for follow-up colonoscopy and referral has been placed back [facility].

## 2023-11-29 PROBLEM — Z12.11 COLON CANCER SCREENING: Status: RESOLVED | Noted: 2023-10-30 | Resolved: 2023-11-29

## 2023-12-29 RX ORDER — OMEPRAZOLE 40 MG/1
40 CAPSULE, DELAYED RELEASE ORAL DAILY
Qty: 90 CAPSULE | Refills: 3 | Status: SHIPPED | OUTPATIENT
Start: 2023-12-29

## 2023-12-29 RX ORDER — VALSARTAN 320 MG/1
320 TABLET ORAL DAILY
Qty: 90 TABLET | Refills: 1 | Status: SHIPPED | OUTPATIENT
Start: 2023-12-29

## 2023-12-29 RX ORDER — EZETIMIBE 10 MG/1
10 TABLET ORAL DAILY
Qty: 90 TABLET | Refills: 3 | Status: SHIPPED | OUTPATIENT
Start: 2023-12-29

## 2023-12-30 DIAGNOSIS — Z79.4 TYPE 2 DIABETES MELLITUS WITHOUT COMPLICATION, WITH LONG-TERM CURRENT USE OF INSULIN (HCC): ICD-10-CM

## 2023-12-30 DIAGNOSIS — E11.9 TYPE 2 DIABETES MELLITUS WITHOUT COMPLICATION, WITH LONG-TERM CURRENT USE OF INSULIN (HCC): ICD-10-CM

## 2024-01-27 DIAGNOSIS — Z79.4 TYPE 2 DIABETES MELLITUS WITHOUT COMPLICATION, WITH LONG-TERM CURRENT USE OF INSULIN (HCC): ICD-10-CM

## 2024-01-27 DIAGNOSIS — E11.9 TYPE 2 DIABETES MELLITUS WITHOUT COMPLICATION, WITH LONG-TERM CURRENT USE OF INSULIN (HCC): ICD-10-CM

## 2024-01-29 RX ORDER — TADALAFIL 5 MG/1
5 TABLET ORAL DAILY
Qty: 90 TABLET | Refills: 1 | Status: SHIPPED | OUTPATIENT
Start: 2024-01-29

## 2024-01-29 RX ORDER — SITAGLIPTIN 100 MG/1
100 TABLET, FILM COATED ORAL DAILY
Qty: 90 TABLET | Refills: 1 | Status: SHIPPED | OUTPATIENT
Start: 2024-01-29

## 2024-02-19 LAB — HBA1C MFR BLD HPLC: 6.8 %

## 2024-02-27 SDOH — HEALTH STABILITY: PHYSICAL HEALTH: ON AVERAGE, HOW MANY DAYS PER WEEK DO YOU ENGAGE IN MODERATE TO STRENUOUS EXERCISE (LIKE A BRISK WALK)?: 5 DAYS

## 2024-02-27 SDOH — HEALTH STABILITY: PHYSICAL HEALTH: ON AVERAGE, HOW MANY MINUTES DO YOU ENGAGE IN EXERCISE AT THIS LEVEL?: 60 MIN

## 2024-02-27 ASSESSMENT — LIFESTYLE VARIABLES
HOW OFTEN DO YOU HAVE A DRINK CONTAINING ALCOHOL: 2-4 TIMES A MONTH
HOW OFTEN DO YOU HAVE A DRINK CONTAINING ALCOHOL: 3
HOW OFTEN DO YOU HAVE SIX OR MORE DRINKS ON ONE OCCASION: 1
HOW MANY STANDARD DRINKS CONTAINING ALCOHOL DO YOU HAVE ON A TYPICAL DAY: 1
HOW MANY STANDARD DRINKS CONTAINING ALCOHOL DO YOU HAVE ON A TYPICAL DAY: 1 OR 2

## 2024-02-27 ASSESSMENT — PATIENT HEALTH QUESTIONNAIRE - PHQ9
2. FEELING DOWN, DEPRESSED OR HOPELESS: 0
SUM OF ALL RESPONSES TO PHQ QUESTIONS 1-9: 0
SUM OF ALL RESPONSES TO PHQ9 QUESTIONS 1 & 2: 0
SUM OF ALL RESPONSES TO PHQ QUESTIONS 1-9: 0
SUM OF ALL RESPONSES TO PHQ QUESTIONS 1-9: 0
1. LITTLE INTEREST OR PLEASURE IN DOING THINGS: 0
SUM OF ALL RESPONSES TO PHQ QUESTIONS 1-9: 0

## 2024-02-28 ENCOUNTER — OFFICE VISIT (OUTPATIENT)
Dept: INTERNAL MEDICINE CLINIC | Age: 69
End: 2024-02-28
Payer: MEDICARE

## 2024-02-28 ENCOUNTER — OFFICE VISIT (OUTPATIENT)
Dept: INTERNAL MEDICINE CLINIC | Age: 69
End: 2024-02-28

## 2024-02-28 VITALS
OXYGEN SATURATION: 96 % | WEIGHT: 197.8 LBS | SYSTOLIC BLOOD PRESSURE: 122 MMHG | HEART RATE: 80 BPM | DIASTOLIC BLOOD PRESSURE: 70 MMHG | BODY MASS INDEX: 33.95 KG/M2

## 2024-02-28 VITALS
HEART RATE: 80 BPM | WEIGHT: 197.75 LBS | DIASTOLIC BLOOD PRESSURE: 70 MMHG | SYSTOLIC BLOOD PRESSURE: 122 MMHG | OXYGEN SATURATION: 96 % | BODY MASS INDEX: 33.94 KG/M2

## 2024-02-28 DIAGNOSIS — E78.5 HYPERLIPIDEMIA LDL GOAL <70: ICD-10-CM

## 2024-02-28 DIAGNOSIS — Z00.00 MEDICARE ANNUAL WELLNESS VISIT, SUBSEQUENT: Primary | ICD-10-CM

## 2024-02-28 DIAGNOSIS — F32.9 REACTIVE DEPRESSION: ICD-10-CM

## 2024-02-28 DIAGNOSIS — M50.30 DEGENERATIVE CERVICAL DISC: ICD-10-CM

## 2024-02-28 DIAGNOSIS — Z79.4 TYPE 2 DIABETES MELLITUS WITHOUT COMPLICATION, WITH LONG-TERM CURRENT USE OF INSULIN (HCC): Primary | ICD-10-CM

## 2024-02-28 DIAGNOSIS — I10 ESSENTIAL HYPERTENSION: ICD-10-CM

## 2024-02-28 DIAGNOSIS — M54.12 CERVICAL RADICULAR PAIN: ICD-10-CM

## 2024-02-28 DIAGNOSIS — E11.9 TYPE 2 DIABETES MELLITUS WITHOUT COMPLICATION, WITH LONG-TERM CURRENT USE OF INSULIN (HCC): Primary | ICD-10-CM

## 2024-02-28 DIAGNOSIS — Z12.11 COLON CANCER SCREENING: ICD-10-CM

## 2024-02-28 DIAGNOSIS — Z12.5 PROSTATE CANCER SCREENING: ICD-10-CM

## 2024-02-28 PROCEDURE — 3078F DIAST BP <80 MM HG: CPT | Performed by: INTERNAL MEDICINE

## 2024-02-28 PROCEDURE — 1123F ACP DISCUSS/DSCN MKR DOCD: CPT | Performed by: INTERNAL MEDICINE

## 2024-02-28 PROCEDURE — 3074F SYST BP LT 130 MM HG: CPT | Performed by: INTERNAL MEDICINE

## 2024-02-28 PROCEDURE — 99214 OFFICE O/P EST MOD 30 MIN: CPT | Performed by: INTERNAL MEDICINE

## 2024-02-28 ASSESSMENT — PATIENT HEALTH QUESTIONNAIRE - PHQ9
10. IF YOU CHECKED OFF ANY PROBLEMS, HOW DIFFICULT HAVE THESE PROBLEMS MADE IT FOR YOU TO DO YOUR WORK, TAKE CARE OF THINGS AT HOME, OR GET ALONG WITH OTHER PEOPLE: 0
1. LITTLE INTEREST OR PLEASURE IN DOING THINGS: 0
SUM OF ALL RESPONSES TO PHQ QUESTIONS 1-9: 3
SUM OF ALL RESPONSES TO PHQ QUESTIONS 1-9: 3
5. POOR APPETITE OR OVEREATING: 0
3. TROUBLE FALLING OR STAYING ASLEEP: 3
2. FEELING DOWN, DEPRESSED OR HOPELESS: 0
8. MOVING OR SPEAKING SO SLOWLY THAT OTHER PEOPLE COULD HAVE NOTICED. OR THE OPPOSITE, BEING SO FIGETY OR RESTLESS THAT YOU HAVE BEEN MOVING AROUND A LOT MORE THAN USUAL: 0
SUM OF ALL RESPONSES TO PHQ QUESTIONS 1-9: 3
6. FEELING BAD ABOUT YOURSELF - OR THAT YOU ARE A FAILURE OR HAVE LET YOURSELF OR YOUR FAMILY DOWN: 0
SUM OF ALL RESPONSES TO PHQ QUESTIONS 1-9: 3
9. THOUGHTS THAT YOU WOULD BE BETTER OFF DEAD, OR OF HURTING YOURSELF: 0
7. TROUBLE CONCENTRATING ON THINGS, SUCH AS READING THE NEWSPAPER OR WATCHING TELEVISION: 0
SUM OF ALL RESPONSES TO PHQ9 QUESTIONS 1 & 2: 0
4. FEELING TIRED OR HAVING LITTLE ENERGY: 0

## 2024-02-28 NOTE — PROGRESS NOTES
Patient: Blayne Wynn is a 68 y.o. male who presents today with the following Chief Complaint(s):  Chief Complaint   Patient presents with    Follow-up    medication issue     Farxiga     Discuss Labs       HPI    Here today for follow up.     DM- was recently told by insurance that Farxiga is no longer covered. Has tried to do better with his diet. Understands now that he needs to watch his carbs- realized this when he was eating bread 16 g of carbs, he was unable to keep his sugars down with eating bread that is 14 g of carbs.   7 low blood sugar events in the past 90 days. Will wake him up. Most occur 12 am- 3 am, 3 events in the afternoon.   With low blood sugars will drink a little bit of juice and eat PB crackers, cottage cheese.    7 day sugar patterns:  > 240 13%  (8% 14 days)  (7% 30 days)  181-240 30% (28% 14 days) (25% 30 days)   57% (64% 14 days) (68% 30 days)    Labs 2/19/24:  HbA1C 6.8% (7.8%)  CMP: Na 139, K 4.3, BUN 21, Cre 1.04, LFT nl.     Struggling a some with depression - anniversary of wife's death, girl friend recently found to have a possible brain tumor (getting scan on Saturday). Was on [employer] team as a . Does have some nightmares, worse recently.     Colonoscopy- never heard from Julio Hargrove's office to schedule. Will call and make the appointment.     Having pain in his mid back- left shoulder, down left arm to elbow. Itches and burns. Does have Norco that he takes very infrequently.     No Known Allergies   Past Medical History:   Diagnosis Date    Appendiceal tumor     Asthma     Cervical spinal stenosis     Degenerative disc disease, lumbar     history of herniated disc at L4-5 with L5 radiculopathy. pt had L4-5 discectomy with Dr Reeves    Diabetes mellitus (HCC)     Esophageal reflux     Gout     Gross hematuria     Hand cramp     Hemorrhoid     Hepatitis     High blood pressure     Hyperlipidemia     Lumbar herniated disc     Major depressive disorder, recurrent

## 2024-02-28 NOTE — PROGRESS NOTES
Medicare Annual Wellness Visit    Blayne Wynn is here for Medicare AWV    Assessment & Plan   Medicare annual wellness visit, subsequent  Recommendations for Preventive Services Due: see orders and patient instructions/AVS.  Recommended screening schedule for the next 5-10 years is provided to the patient in written form: see Patient Instructions/AVS.     Return in 1 year (on 2/28/2025).     Subjective   Spoke with pt about care gaps. Pt stated that he had shingles, COVID, RSV vaccines at [facility]. Also referral for colonoscopy given to patient.    Patient's complete Health Risk Assessment and screening values have been reviewed and are found in Flowsheets. The following problems were reviewed today and where indicated follow up appointments were made and/or referrals ordered.    Positive Risk Factor Screenings with Interventions:                Activity, Diet, and Weight:  On average, how many days per week do you engage in moderate to strenuous exercise (like a brisk walk)?: 5 days  On average, how many minutes do you engage in exercise at this level?: 60 min    Do you eat balanced/healthy meals regularly?: Yes    Body mass index is 33.95 kg/m². (!) Abnormal    Obesity Interventions:  See AVS for additional education material        Dentist Screen:  Have you seen the dentist within the past year?: (!) No    Intervention:  Spoke with patient to see a dentist yearly.        Advanced Directives:  Do you have a Living Will?: (!) No    Intervention:  has NO advanced directive - information provided                     Objective   Vitals:    02/28/24 0953   BP: 122/70   Pulse: 80   SpO2: 96%   Weight: 89.7 kg (197 lb 12.8 oz)      Body mass index is 33.95 kg/m².               No Known Allergies  Prior to Visit Medications    Medication Sig Taking? Authorizing Provider   JANUVIA 100 MG tablet TAKE 1 TABLET BY MOUTH DAILY Yes Viviana Mccain, DO   tadalafil (CIALIS) 5 MG tablet Take 1 tablet by mouth daily Yes

## 2024-02-28 NOTE — PATIENT INSTRUCTIONS

## 2024-03-25 RX ORDER — INSULIN LISPRO 100 [IU]/ML
INJECTION, SUSPENSION SUBCUTANEOUS
Qty: 15 ML | Refills: 3 | Status: SHIPPED | OUTPATIENT
Start: 2024-03-25

## 2024-04-02 PROBLEM — Z12.11 COLON CANCER SCREENING: Status: RESOLVED | Noted: 2023-10-30 | Resolved: 2024-04-02

## 2024-04-05 RX ORDER — METFORMIN HYDROCHLORIDE 750 MG/1
TABLET, EXTENDED RELEASE ORAL
Qty: 90 TABLET | Refills: 1 | Status: SHIPPED | OUTPATIENT
Start: 2024-04-05

## 2024-04-05 NOTE — TELEPHONE ENCOUNTER
Last OV: 2/28/2024  Next OV: 7/1/2024    Next appointment due:02/28/25    Last fill:04/02/24  Refills:1  #90

## 2024-04-16 RX ORDER — PEN NEEDLE, DIABETIC 32GX 5/32"
NEEDLE, DISPOSABLE MISCELLANEOUS
Qty: 200 EACH | Refills: 3 | OUTPATIENT
Start: 2024-04-16

## 2024-04-16 NOTE — TELEPHONE ENCOUNTER
Last OV: 2/28/2024  Next OV: 7/1/2024    Next appointment due:(around 6/28/2024     Last fill:3/27/23  Refills:3

## 2024-05-10 RX ORDER — TADALAFIL 5 MG/1
5 TABLET ORAL DAILY
Qty: 90 TABLET | Refills: 1 | Status: SHIPPED | OUTPATIENT
Start: 2024-05-10

## 2024-05-10 RX ORDER — PEN NEEDLE, DIABETIC 32GX 5/32"
NEEDLE, DISPOSABLE MISCELLANEOUS
Qty: 200 EACH | Refills: 3 | Status: SHIPPED | OUTPATIENT
Start: 2024-05-10

## 2024-06-01 DIAGNOSIS — E11.9 TYPE 2 DIABETES MELLITUS WITHOUT COMPLICATION, WITH LONG-TERM CURRENT USE OF INSULIN (HCC): ICD-10-CM

## 2024-06-01 DIAGNOSIS — Z79.4 TYPE 2 DIABETES MELLITUS WITHOUT COMPLICATION, WITH LONG-TERM CURRENT USE OF INSULIN (HCC): ICD-10-CM

## 2024-06-03 RX ORDER — INSULIN LISPRO 100 [IU]/ML
INJECTION, SUSPENSION SUBCUTANEOUS
Qty: 15 ML | Refills: 3 | Status: SHIPPED | OUTPATIENT
Start: 2024-06-03

## 2024-06-03 RX ORDER — OMEPRAZOLE 40 MG/1
40 CAPSULE, DELAYED RELEASE ORAL DAILY
Qty: 90 CAPSULE | Refills: 3 | Status: SHIPPED | OUTPATIENT
Start: 2024-06-03

## 2024-06-10 NOTE — ASSESSMENT & PLAN NOTE
"  Problem: Adult Inpatient Plan of Care  Goal: Plan of Care Review  Description: The Plan of Care Review/Shift note should be completed every shift.  The Outcome Evaluation is a brief statement about your assessment that the patient is improving, declining, or no change.  This information will be displayed automatically on your shift  note.  Outcome: Progressing  Flowsheets (Taken 6/10/2024 1649)  Plan of Care Reviewed With: patient  Goal: Patient-Specific Goal (Individualized)  Description: You can add care plan individualizations to a care plan. Examples of Individualization might be:  \"Parent requests to be called daily at 9am for status\", \"I have a hard time hearing out of my right ear\", or \"Do not touch me to wake me up as it startles  me\".  Outcome: Progressing  Goal: Absence of Hospital-Acquired Illness or Injury  Outcome: Progressing  Intervention: Prevent Skin Injury  Recent Flowsheet Documentation  Taken 6/10/2024 1647 by Blanquita Lehman RN  Device Skin Pressure Protection:   absorbent pad utilized/changed   adhesive use limited  Taken 6/10/2024 0806 by Blanquita Lehman RN  Device Skin Pressure Protection:   absorbent pad utilized/changed   adhesive use limited  Goal: Optimal Comfort and Wellbeing  Outcome: Progressing  Intervention: Provide Person-Centered Care  Recent Flowsheet Documentation  Taken 6/10/2024 1647 by Blanquita Lehman RN  Trust Relationship/Rapport:   care explained   choices provided  Taken 6/10/2024 0806 by Blanquita Lehman, JOSE  Trust Relationship/Rapport:   care explained   choices provided  Goal: Readiness for Transition of Care  Outcome: Progressing     Problem: Risk for Delirium  Goal: Optimal Coping  Outcome: Progressing  Intervention: Optimize Psychosocial Adjustment to Delirium  Recent Flowsheet Documentation  Taken 6/10/2024 1647 by Blanquita Lehman, RN  Supportive Measures: active listening utilized  Taken 6/10/2024 0806 by Blanquita Lehman, RN  Supportive Measures: active listening " Uncontrolled. He did stop Crestor as he thought it have been recalled. Resume Crestor 40 mg q. day.   Triglycerides are also elevated but I suspect this relates to his poorly controlled utilized  Goal: Improved Behavioral Control  Outcome: Progressing  Intervention: Minimize Safety Risk  Recent Flowsheet Documentation  Taken 6/10/2024 1647 by Blanquita Lehman RN  Communication Enhancement Strategies: call light answered in person  Trust Relationship/Rapport:   care explained   choices provided  Taken 6/10/2024 0806 by Blanquita Lehman RN  Communication Enhancement Strategies: call light answered in person  Trust Relationship/Rapport:   care explained   choices provided  Goal: Improved Attention and Thought Clarity  Outcome: Progressing  Goal: Improved Sleep  Outcome: Progressing     Problem: Skin or Soft Tissue Infection  Goal: Absence of Infection Signs and Symptoms  Outcome: Progressing     Problem: Pain Acute  Goal: Optimal Pain Control and Function  Outcome: Progressing  Intervention: Prevent or Manage Pain  Recent Flowsheet Documentation  Taken 6/10/2024 1647 by Blanquita Lehman RN  Bowel Elimination Promotion: adequate fluid intake promoted  Taken 6/10/2024 0806 by Blanquita Lehman RN  Bowel Elimination Promotion: adequate fluid intake promoted  Intervention: Optimize Psychosocial Wellbeing  Recent Flowsheet Documentation  Taken 6/10/2024 1647 by Blanquita Lehman RN  Supportive Measures: active listening utilized  Taken 6/10/2024 0806 by Blanquita Lehman RN  Supportive Measures: active listening utilized   Goal Outcome Evaluation:      Plan of Care Reviewed With: patient      Awaiting discharge.  PICC line placed.  Right lower extremity dressing change complete during mid day skin assessment.  Buttocks with open slit in coccyx.  Appeared excoriated with open area on right buttocks cheek.  Skin peeling and flak ey.  Difficult to see full skin due to layers of triad paste and patients intolerance.

## 2024-07-01 LAB — HBA1C MFR BLD HPLC: 7.1 %

## 2024-07-06 SDOH — ECONOMIC STABILITY: FOOD INSECURITY: WITHIN THE PAST 12 MONTHS, THE FOOD YOU BOUGHT JUST DIDN'T LAST AND YOU DIDN'T HAVE MONEY TO GET MORE.: NEVER TRUE

## 2024-07-06 SDOH — ECONOMIC STABILITY: FOOD INSECURITY: WITHIN THE PAST 12 MONTHS, YOU WORRIED THAT YOUR FOOD WOULD RUN OUT BEFORE YOU GOT MONEY TO BUY MORE.: NEVER TRUE

## 2024-07-06 SDOH — ECONOMIC STABILITY: INCOME INSECURITY: HOW HARD IS IT FOR YOU TO PAY FOR THE VERY BASICS LIKE FOOD, HOUSING, MEDICAL CARE, AND HEATING?: NOT HARD AT ALL

## 2024-07-08 ENCOUNTER — TELEPHONE (OUTPATIENT)
Dept: PHARMACY | Facility: CLINIC | Age: 69
End: 2024-07-08

## 2024-07-08 NOTE — TELEPHONE ENCOUNTER
Pharmacy on 24 - for 90 days and 1 refill - requested by [employer].        PLAN  Interventions that have been identified include:  - Patient overdue refilling rosuvastatin and active on home medication list.   - Patient identified as having SUPD gap  Provider sent additional refills for rosuvastatin 40 mg to [facility] on 24 - for 90 days and 1 refill - requested by [employer].    Attempting to reach patient to review.  Left message asking for return call. and MyChart message sent to patient    Future Appointments   Date Time Provider Department Center   2024 11:40 AM Viviana Mccain DO [place]  [facility]  Population Health Pharmacist  [facility]  Department, toll free: [phone], option 1       For Pharmacy Admin Tracking Only  Program: [employer]  CPA in place:  No  Intervention Detail: Adherence Monitorin  Gap Closed?: No   Time Spent (min): 30

## 2024-07-09 ENCOUNTER — OFFICE VISIT (OUTPATIENT)
Dept: INTERNAL MEDICINE CLINIC | Age: 69
End: 2024-07-09
Payer: MEDICARE

## 2024-07-09 VITALS
SYSTOLIC BLOOD PRESSURE: 132 MMHG | WEIGHT: 197.6 LBS | HEIGHT: 64 IN | HEART RATE: 68 BPM | DIASTOLIC BLOOD PRESSURE: 80 MMHG | BODY MASS INDEX: 33.73 KG/M2 | OXYGEN SATURATION: 96 %

## 2024-07-09 DIAGNOSIS — I10 ESSENTIAL HYPERTENSION: ICD-10-CM

## 2024-07-09 DIAGNOSIS — Z79.4 TYPE 2 DIABETES MELLITUS WITHOUT COMPLICATION, WITH LONG-TERM CURRENT USE OF INSULIN (HCC): Primary | ICD-10-CM

## 2024-07-09 DIAGNOSIS — E11.9 TYPE 2 DIABETES MELLITUS WITHOUT COMPLICATION, WITH LONG-TERM CURRENT USE OF INSULIN (HCC): Primary | ICD-10-CM

## 2024-07-09 DIAGNOSIS — E78.5 HYPERLIPIDEMIA LDL GOAL <70: ICD-10-CM

## 2024-07-09 PROCEDURE — 99214 OFFICE O/P EST MOD 30 MIN: CPT | Performed by: INTERNAL MEDICINE

## 2024-07-09 PROCEDURE — 1123F ACP DISCUSS/DSCN MKR DOCD: CPT | Performed by: INTERNAL MEDICINE

## 2024-07-09 PROCEDURE — 3079F DIAST BP 80-89 MM HG: CPT | Performed by: INTERNAL MEDICINE

## 2024-07-09 PROCEDURE — 3075F SYST BP GE 130 - 139MM HG: CPT | Performed by: INTERNAL MEDICINE

## 2024-07-09 RX ORDER — ROSUVASTATIN CALCIUM 40 MG/1
40 TABLET, COATED ORAL DAILY
Qty: 90 TABLET | Refills: 1 | Status: SHIPPED | OUTPATIENT
Start: 2024-07-09

## 2024-07-09 RX ORDER — DULAGLUTIDE 0.75 MG/.5ML
0.75 INJECTION, SOLUTION SUBCUTANEOUS WEEKLY
Qty: 4 ADJUSTABLE DOSE PRE-FILLED PEN SYRINGE | Refills: 3 | Status: SHIPPED | OUTPATIENT
Start: 2024-07-09

## 2024-07-09 ASSESSMENT — ENCOUNTER SYMPTOMS
SHORTNESS OF BREATH: 0
ABDOMINAL PAIN: 0

## 2024-07-09 NOTE — PATIENT INSTRUCTIONS
If you take Trulicity, stop metformin and Januvia. My hope is that I will also be able to get you off of insulin. Let me know as I will need to decrease your insulin dose.

## 2024-07-09 NOTE — PROGRESS NOTES
sensed.      Skin integrity: Skin integrity normal.      Toenail Condition: Right toenails are normal.      Left foot:      Protective Sensation: 10 sites tested.  10 sites sensed.      Skin integrity: Skin integrity normal.      Toenail Condition: Left toenails are normal.   Lymphadenopathy:      Cervical: No cervical adenopathy.   Neurological:      General: No focal deficit present.      Mental Status: He is alert and oriented to person, place, and time.   Psychiatric:         Mood and Affect: Mood normal.         Behavior: Behavior normal. Behavior is cooperative.         ASSESSMENT/PLAN:    Problem List Items Addressed This Visit       Type 2 diabetes mellitus without complication, with long-term current use of insulin ([facility]) - Primary     Well-controlled with hemoglobin A1c of 7.1%..  Patient would like to resume Trulicity as he felt his blood sugars were much better controlled while taking Trulicity.  Resume Trulicity 0.75 mg weekly and discontinue Januvia and metformin.  Continue Jardiance 25 mg daily and Humalog 75/25 mix 40 units with breakfast and 42 units with dinner.  Goal is to wean him off of Humalog as well and push dose of Trulicity.         Relevant Medications    dulaglutide (TRULICITY) 0.75 MG/0.5ML SOPN SC injection    Other Relevant Orders     DIABETES FOOT EXAM (Completed)    Hemoglobin A1C    Comprehensive Metabolic Panel    Microalbumin / Creatinine Urine Ratio    CBC with Auto Differential    Essential hypertension      Well-controlled.  Continue valsartan 320 mg daily.         Hyperlipidemia LDL goal <70      Well-controlled with LDL of 61 and triglycerides of 81.   Continue Crestor 40 mg daily and Zetia 10 mg daily.  Hypertriglyceridemia tends to relate to his uncontrolled blood sugars.  As diabetes has been under better control triglycerides has not been elevated.            Current Outpatient Medications   Medication Sig Dispense Refill    rosuvastatin (CRESTOR) 40 MG tablet TAKE 1

## 2024-07-10 ENCOUNTER — PATIENT MESSAGE (OUTPATIENT)
Dept: INTERNAL MEDICINE CLINIC | Age: 69
End: 2024-07-10

## 2024-07-11 NOTE — TELEPHONE ENCOUNTER
From: Blayne Wynn  To: Dr. Viviana Mccain  Sent: 7/10/2024 12:20 PM EDT  Subject: Trulicity    I am able to get the Trulicity for a manageable price. Still expensive but I can afford it.   What medication can I stop taking and how much insulin should I take?  Thanks  Julio

## 2024-08-14 RX ORDER — TADALAFIL 5 MG/1
5 TABLET ORAL DAILY
Qty: 90 TABLET | Refills: 1 | Status: SHIPPED | OUTPATIENT
Start: 2024-08-14

## 2024-09-30 NOTE — PROGRESS NOTES
Patient: Salo Bob is a 77 y.o. male who presents today with the following Chief Complaint(s):  Chief Complaint   Patient presents with    Follow-up     4 month       HPI    Here today for follow up. Doing well. DM- is getting Farxiga 10 mg qd. Not able to get Trulicity d/t cost. Sugars ranging 116-192. Highest sugars are typically after lunch (no shot) and after dinner. Taking 40 units BID on Humalog 75/25 mix. Has had 7 lows in the past 90 days, 4 in the past month. Has been drinking 1-2 beers most nights recently. Will cut back after seeing lab results. Due for colonoscopy. Was scheduled for March 2020 but was cancelled d/t COVID. Needs to r/s. HLD- doing well on Crestor and Zetia. HTN- no issues wit BP. Only on Diovan 320 mg qd. No lightheadedness. Back and neck pain- ok in general but does still occasionally require pain medication. Given rx for #60 [place] in June but insurance would not cover. Does taking diclofenac BID. Does have tingling and numbness in hand and upper back pain related to neck pain. Still has chronic low back pain. Lifted wife while she was dying and felt back pop. Still struggling with CPAP. States that his sleep is terrible. Does not want to see sleep medicine at this time. Using a full face mask.      Results for orders placed or performed in visit on 10/25/23   Hemoglobin A1C   Result Value Ref Range    Hemoglobin A1C 7.8 (H) 4.2 - 5.6 %    eAG 177 mg/dL      Lab Results   Component Value Date    LABA1C 7.8 (H) 10/25/2023    LABA1C 7.7 (H) 06/26/2023    LABA1C 6.6 (H) 02/20/2023     Lab Results   Component Value Date    MALBCR NULL 10/25/2023    CREATININE 1.04 10/25/2023     Lab Results   Component Value Date     10/25/2023    K 4.3 10/25/2023     10/25/2023    CO2 25 10/25/2023    BUN 22 10/25/2023    CREATININE 1.04 10/25/2023    GLUCOSE 122 (H) 10/25/2023    CALCIUM 9.6 10/25/2023    PROT 7.5 10/25/2023    LABALBU 4.5 10/25/2023 [facility]

## 2024-10-10 RX ORDER — ROSUVASTATIN CALCIUM 40 MG/1
40 TABLET, COATED ORAL DAILY
Qty: 90 TABLET | Refills: 1 | Status: SHIPPED | OUTPATIENT
Start: 2024-10-10

## 2024-10-30 LAB
ABSOLUTE BANDS: NORMAL
ALBUMIN: 4.3 G/DL
ALP BLD-CCNC: 56 U/L
ALT SERPL-CCNC: 40 U/L
ANION GAP SERPL CALCULATED.3IONS-SCNC: 15 MMOL/L
AST SERPL-CCNC: 31 U/L
BANDS: NORMAL
BASOPHILS ABSOLUTE: 0 /ΜL
BASOPHILS RELATIVE PERCENT: 0.4 %
BILIRUB SERPL-MCNC: 0.6 MG/DL (ref 0.1–1.4)
BUN BLDV-MCNC: 16 MG/DL
CALCIUM SERPL-MCNC: 10.1 MG/DL
CHLORIDE BLD-SCNC: 101 MMOL/L
CO2: 21 MMOL/L
CREAT SERPL-MCNC: 0.96 MG/DL
CREATININE URINE: 80 MG/DL
EOSINOPHILS RELATIVE PERCENT: 3.2 %
ESTIMATED AVERAGE GLUCOSE: 171
GFR, ESTIMATED: 85
GLUCOSE BLD-MCNC: 166 MG/DL
HBA1C MFR BLD: 7.6 %
HCT VFR BLD CALC: 45 % (ref 41–53)
HEMOGLOBIN: 15.6 G/DL (ref 13.5–17.5)
IMMATURE GRANULOCYTES %: 0.1
IMMATURE GRANULOCYTES ABSOLUTE: NORMAL
LYMPHOCYTES # BLD: 2.2 10*3/UL
LYMPHOCYTES ABSOLUTE: NORMAL
MCH RBC QN AUTO: 30.1 PG
MCHC RBC AUTO-ENTMCNC: 34.7 G/DL
MCV RBC AUTO: 86.7 FL
MICROALBUMIN/CREAT 24H UR: <12 MG/DL
MICROALBUMIN/CREAT UR-RTO: NORMAL
MONOCYTES ABSOLUTE: 0.2 /ΜL
MONOCYTES ABSOLUTE: 0.5 /ΜL
MONOCYTES RELATIVE PERCENT: 7.4 %
MORPHOLOGY: NORMAL
NEUTROPHILS ABSOLUTE: 4 /ΜL
NRBC AUTOMATED: NORMAL
PDW BLD-RTO: 12.7 %
PLATELET # BLD: 244 K/ΜL
PLATELET ESTIMATE: NORMAL
PMV BLD AUTO: 9.5 FL
POTASSIUM SERPL-SCNC: 4.2 MMOL/L
RBC # BLD: 5.19 10^6/ΜL
RBC # BLD: NORMAL 10*6/UL
SEG NEUTROPHILS: NORMAL
SODIUM BLD-SCNC: 137 MMOL/L
TOTAL PROTEIN: 7.5 G/DL (ref 6.4–8.2)
WBC # BLD: 6.9 10^3/ML
WBC # BLD: NORMAL 10*3/UL

## 2024-11-05 RX ORDER — EMPAGLIFLOZIN 25 MG/1
25 TABLET, FILM COATED ORAL DAILY
Qty: 90 TABLET | Refills: 3 | Status: SHIPPED | OUTPATIENT
Start: 2024-11-05

## 2024-11-12 ENCOUNTER — OFFICE VISIT (OUTPATIENT)
Dept: INTERNAL MEDICINE CLINIC | Age: 69
End: 2024-11-12

## 2024-11-12 VITALS
HEART RATE: 81 BPM | HEIGHT: 64 IN | DIASTOLIC BLOOD PRESSURE: 80 MMHG | WEIGHT: 189.4 LBS | OXYGEN SATURATION: 96 % | SYSTOLIC BLOOD PRESSURE: 110 MMHG | BODY MASS INDEX: 32.33 KG/M2

## 2024-11-12 DIAGNOSIS — Z12.11 COLON CANCER SCREENING: ICD-10-CM

## 2024-11-12 DIAGNOSIS — D37.3 APPENDICEAL TUMOR: ICD-10-CM

## 2024-11-12 DIAGNOSIS — Z86.0100 HISTORY OF COLON POLYPS: ICD-10-CM

## 2024-11-12 DIAGNOSIS — M10.9 GOUT WITHOUT TOPHUS: ICD-10-CM

## 2024-11-12 DIAGNOSIS — I10 ESSENTIAL HYPERTENSION: ICD-10-CM

## 2024-11-12 DIAGNOSIS — E11.9 TYPE 2 DIABETES MELLITUS WITHOUT COMPLICATION, WITH LONG-TERM CURRENT USE OF INSULIN (HCC): Primary | ICD-10-CM

## 2024-11-12 DIAGNOSIS — Z79.4 TYPE 2 DIABETES MELLITUS WITHOUT COMPLICATION, WITH LONG-TERM CURRENT USE OF INSULIN (HCC): Primary | ICD-10-CM

## 2024-11-12 DIAGNOSIS — E78.5 HYPERLIPIDEMIA LDL GOAL <70: ICD-10-CM

## 2024-11-12 PROBLEM — Z86.16 HISTORY OF 2019 NOVEL CORONAVIRUS DISEASE (COVID-19): Status: RESOLVED | Noted: 2020-07-06 | Resolved: 2024-11-12

## 2024-11-12 RX ORDER — INSULIN LISPRO 100 [IU]/ML
INJECTION, SUSPENSION SUBCUTANEOUS
Qty: 15 ML | Refills: 3 | Status: SHIPPED | OUTPATIENT
Start: 2024-11-12

## 2024-11-12 RX ORDER — DULAGLUTIDE 1.5 MG/.5ML
1.5 INJECTION, SOLUTION SUBCUTANEOUS WEEKLY
Qty: 2 ML | Refills: 3 | Status: SHIPPED | OUTPATIENT
Start: 2024-11-12

## 2024-11-12 ASSESSMENT — ENCOUNTER SYMPTOMS
CONSTIPATION: 0
DIARRHEA: 0
SHORTNESS OF BREATH: 0
CHEST TIGHTNESS: 0

## 2024-11-12 NOTE — PROGRESS NOTES
Highest education level: Not on file   Occupational History    Occupation:    Tobacco Use    Smoking status: Never    Smokeless tobacco: Never   Substance and Sexual Activity    Alcohol use: Yes     Alcohol/week: 2.0 standard drinks of alcohol     Types: 2 Cans of beer per week    Drug use: No    Sexual activity: Yes     Partners: Female   Other Topics Concern    Not on file   Social History Narrative    Lives with sister in law.      Social Determinants of Health     Financial Resource Strain: Low Risk  (7/6/2024)    Overall Financial Resource Strain (CARDIA)     Difficulty of Paying Living Expenses: Not hard at all   Food Insecurity: No Food Insecurity (7/6/2024)    Hunger Vital Sign     Worried About Running Out of Food in the Last Year: Never true     Ran Out of Food in the Last Year: Never true   Transportation Needs: Unknown (7/6/2024)    PRAPARE - Transportation     Lack of Transportation (Medical): Not on file     Lack of Transportation (Non-Medical): No   Physical Activity: Sufficiently Active (2/27/2024)    Exercise Vital Sign     Days of Exercise per Week: 5 days     Minutes of Exercise per Session: 60 min   Stress: Not on file   Social Connections: Not on file   Intimate Partner Violence: Not on file   Housing Stability: Unknown (7/6/2024)    Housing Stability Vital Sign     Unable to Pay for Housing in the Last Year: Not on file     Number of Places Lived in the Last Year: Not on file     Unstable Housing in the Last Year: No     Family History   Problem Relation Age of Onset    Cancer Mother     Cancer Father     Heart Disease Father     High Blood Pressure Father     Diabetes Sister         Outpatient Medications Prior to Visit   Medication Sig Dispense Refill    JARDIANCE 25 MG tablet TAKE 1 TABLET BY MOUTH DAILY 90 tablet 3    rosuvastatin (CRESTOR) 40 MG tablet TAKE 1 TABLET BY MOUTH DAILY 90 tablet 1    tadalafil (CIALIS) 5 MG tablet Take 1 tablet by mouth daily 90 tablet

## 2024-11-12 NOTE — ASSESSMENT & PLAN NOTE
Overdue for colonoscopy.  Referral placed to Dr. Julio Hargrove for colonoscopy (did previous colonoscopies).

## 2024-11-12 NOTE — ASSESSMENT & PLAN NOTE
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  Underwent resection with Dr. Julio Hargrove.  Due for follow-up colonoscopy and referral has been placed back to Dr. Hargrove.

## 2024-11-12 NOTE — ASSESSMENT & PLAN NOTE
Continue Crestor 40 mg daily and Zetia 10 mg daily.  Check lipid panel, TSH, CMP with next set of labs.

## 2024-11-12 NOTE — ASSESSMENT & PLAN NOTE
Hemoglobin A1c has increased to 7.6%.  Increase Trulicity 1.5 mg weekly.  Change Humalog 75/25 mix to 42 units with breakfast/20 units with dinner (was not taking dinner dose) until he uses up his current supply of Humalog mix.  Once his supply of Humalog 75/25 mix is depleted, will change him to Tresiba 16 units a.m. and Humalog 10 units with meals.  Continue Jardiance 25 mg daily.

## 2024-11-12 NOTE — ASSESSMENT & PLAN NOTE
No recent flairs. Check uric acid level. Has been on allopurinol in the past. Initial presentation was in 2003.   Length To Time In Minutes Device Was In Place: 10

## 2024-12-09 RX ORDER — INSULIN LISPRO 100 [IU]/ML
INJECTION, SUSPENSION SUBCUTANEOUS
Qty: 15 ML | Refills: 3 | OUTPATIENT
Start: 2024-12-09

## 2024-12-13 RX ORDER — INSULIN LISPRO 100 [IU]/ML
INJECTION, SUSPENSION SUBCUTANEOUS
Qty: 15 ML | Refills: 3 | OUTPATIENT
Start: 2024-12-13

## 2024-12-13 RX ORDER — INSULIN LISPRO 100 [IU]/ML
INJECTION, SUSPENSION SUBCUTANEOUS
Qty: 15 ML | Refills: 3 | Status: SHIPPED | OUTPATIENT
Start: 2024-12-13

## 2025-01-13 RX ORDER — EZETIMIBE 10 MG/1
10 TABLET ORAL DAILY
Qty: 90 TABLET | Refills: 1 | Status: SHIPPED | OUTPATIENT
Start: 2025-01-13

## 2025-02-10 DIAGNOSIS — N52.8 OTHER MALE ERECTILE DYSFUNCTION: Primary | ICD-10-CM

## 2025-02-10 RX ORDER — ROSUVASTATIN CALCIUM 40 MG/1
40 TABLET, COATED ORAL DAILY
Qty: 90 TABLET | Refills: 1 | Status: SHIPPED | OUTPATIENT
Start: 2025-02-10

## 2025-02-10 RX ORDER — VALSARTAN 320 MG/1
320 TABLET ORAL DAILY
Qty: 90 TABLET | Refills: 1 | Status: SHIPPED | OUTPATIENT
Start: 2025-02-10

## 2025-02-10 RX ORDER — EZETIMIBE 10 MG/1
10 TABLET ORAL DAILY
Qty: 90 TABLET | Refills: 1 | Status: SHIPPED | OUTPATIENT
Start: 2025-02-10

## 2025-02-10 RX ORDER — TADALAFIL 5 MG/1
5 TABLET ORAL DAILY
Qty: 90 TABLET | Refills: 1 | Status: SHIPPED | OUTPATIENT
Start: 2025-02-10

## 2025-02-20 LAB — HBA1C MFR BLD HPLC: 7 %

## 2025-03-07 RX ORDER — INSULIN LISPRO 100 [IU]/ML
INJECTION, SUSPENSION SUBCUTANEOUS
Qty: 15 ML | Refills: 3 | Status: SHIPPED | OUTPATIENT
Start: 2025-03-07

## 2025-03-07 NOTE — TELEPHONE ENCOUNTER
Last OV: 11/12/2024  Next OV: 3/17/2025    Next appointment due: Return in about 4 months (around 3/12/2025).     Last fill: 12/13/2024  Refills: 3

## 2025-03-10 SDOH — HEALTH STABILITY: PHYSICAL HEALTH: ON AVERAGE, HOW MANY MINUTES DO YOU ENGAGE IN EXERCISE AT THIS LEVEL?: 60 MIN

## 2025-03-10 SDOH — HEALTH STABILITY: PHYSICAL HEALTH: ON AVERAGE, HOW MANY DAYS PER WEEK DO YOU ENGAGE IN MODERATE TO STRENUOUS EXERCISE (LIKE A BRISK WALK)?: 4 DAYS

## 2025-03-10 ASSESSMENT — PATIENT HEALTH QUESTIONNAIRE - PHQ9
SUM OF ALL RESPONSES TO PHQ QUESTIONS 1-9: 2
1. LITTLE INTEREST OR PLEASURE IN DOING THINGS: NOT AT ALL
2. FEELING DOWN, DEPRESSED OR HOPELESS: SEVERAL DAYS
10. IF YOU CHECKED OFF ANY PROBLEMS, HOW DIFFICULT HAVE THESE PROBLEMS MADE IT FOR YOU TO DO YOUR WORK, TAKE CARE OF THINGS AT HOME, OR GET ALONG WITH OTHER PEOPLE: NOT DIFFICULT AT ALL
SUM OF ALL RESPONSES TO PHQ QUESTIONS 1-9: 2
6. FEELING BAD ABOUT YOURSELF - OR THAT YOU ARE A FAILURE OR HAVE LET YOURSELF OR YOUR FAMILY DOWN: NOT AT ALL
SUM OF ALL RESPONSES TO PHQ QUESTIONS 1-9: 2
3. TROUBLE FALLING OR STAYING ASLEEP: SEVERAL DAYS
9. THOUGHTS THAT YOU WOULD BE BETTER OFF DEAD, OR OF HURTING YOURSELF: NOT AT ALL
4. FEELING TIRED OR HAVING LITTLE ENERGY: NOT AT ALL
8. MOVING OR SPEAKING SO SLOWLY THAT OTHER PEOPLE COULD HAVE NOTICED. OR THE OPPOSITE, BEING SO FIGETY OR RESTLESS THAT YOU HAVE BEEN MOVING AROUND A LOT MORE THAN USUAL: NOT AT ALL
7. TROUBLE CONCENTRATING ON THINGS, SUCH AS READING THE NEWSPAPER OR WATCHING TELEVISION: NOT AT ALL
5. POOR APPETITE OR OVEREATING: NOT AT ALL
SUM OF ALL RESPONSES TO PHQ QUESTIONS 1-9: 2

## 2025-03-10 ASSESSMENT — LIFESTYLE VARIABLES
HOW OFTEN DO YOU HAVE SIX OR MORE DRINKS ON ONE OCCASION: 1
HOW OFTEN DO YOU HAVE A DRINK CONTAINING ALCOHOL: 2-4 TIMES A MONTH
HOW MANY STANDARD DRINKS CONTAINING ALCOHOL DO YOU HAVE ON A TYPICAL DAY: 1
HOW OFTEN DO YOU HAVE A DRINK CONTAINING ALCOHOL: 3
HOW MANY STANDARD DRINKS CONTAINING ALCOHOL DO YOU HAVE ON A TYPICAL DAY: 1 OR 2

## 2025-03-14 SDOH — ECONOMIC STABILITY: FOOD INSECURITY: WITHIN THE PAST 12 MONTHS, YOU WORRIED THAT YOUR FOOD WOULD RUN OUT BEFORE YOU GOT MONEY TO BUY MORE.: NEVER TRUE

## 2025-03-14 SDOH — ECONOMIC STABILITY: FOOD INSECURITY: WITHIN THE PAST 12 MONTHS, THE FOOD YOU BOUGHT JUST DIDN'T LAST AND YOU DIDN'T HAVE MONEY TO GET MORE.: NEVER TRUE

## 2025-03-14 SDOH — ECONOMIC STABILITY: INCOME INSECURITY: IN THE LAST 12 MONTHS, WAS THERE A TIME WHEN YOU WERE NOT ABLE TO PAY THE MORTGAGE OR RENT ON TIME?: NO

## 2025-03-14 SDOH — ECONOMIC STABILITY: TRANSPORTATION INSECURITY
IN THE PAST 12 MONTHS, HAS THE LACK OF TRANSPORTATION KEPT YOU FROM MEDICAL APPOINTMENTS OR FROM GETTING MEDICATIONS?: NO

## 2025-03-17 ENCOUNTER — OFFICE VISIT (OUTPATIENT)
Dept: INTERNAL MEDICINE CLINIC | Age: 70
End: 2025-03-17
Payer: MEDICARE

## 2025-03-17 ENCOUNTER — OFFICE VISIT (OUTPATIENT)
Dept: INTERNAL MEDICINE CLINIC | Age: 70
End: 2025-03-17

## 2025-03-17 VITALS
OXYGEN SATURATION: 98 % | SYSTOLIC BLOOD PRESSURE: 110 MMHG | WEIGHT: 191 LBS | BODY MASS INDEX: 32.61 KG/M2 | DIASTOLIC BLOOD PRESSURE: 70 MMHG | HEIGHT: 64 IN | HEART RATE: 72 BPM

## 2025-03-17 VITALS — DIASTOLIC BLOOD PRESSURE: 72 MMHG | SYSTOLIC BLOOD PRESSURE: 110 MMHG | HEART RATE: 70 BPM

## 2025-03-17 DIAGNOSIS — Z79.4 TYPE 2 DIABETES MELLITUS WITHOUT COMPLICATION, WITH LONG-TERM CURRENT USE OF INSULIN: Primary | ICD-10-CM

## 2025-03-17 DIAGNOSIS — Z00.00 MEDICARE ANNUAL WELLNESS VISIT, SUBSEQUENT: Primary | ICD-10-CM

## 2025-03-17 DIAGNOSIS — Z86.0100 HISTORY OF COLON POLYPS: ICD-10-CM

## 2025-03-17 DIAGNOSIS — E11.9 TYPE 2 DIABETES MELLITUS WITHOUT COMPLICATION, WITH LONG-TERM CURRENT USE OF INSULIN: Primary | ICD-10-CM

## 2025-03-17 DIAGNOSIS — M10.9 GOUT WITHOUT TOPHUS: ICD-10-CM

## 2025-03-17 DIAGNOSIS — I10 ESSENTIAL HYPERTENSION: ICD-10-CM

## 2025-03-17 DIAGNOSIS — E78.5 HYPERLIPIDEMIA LDL GOAL <70: ICD-10-CM

## 2025-03-17 PROCEDURE — 3074F SYST BP LT 130 MM HG: CPT | Performed by: INTERNAL MEDICINE

## 2025-03-17 PROCEDURE — 3078F DIAST BP <80 MM HG: CPT | Performed by: INTERNAL MEDICINE

## 2025-03-17 PROCEDURE — 1159F MED LIST DOCD IN RCRD: CPT | Performed by: INTERNAL MEDICINE

## 2025-03-17 PROCEDURE — 99214 OFFICE O/P EST MOD 30 MIN: CPT | Performed by: INTERNAL MEDICINE

## 2025-03-17 PROCEDURE — 1123F ACP DISCUSS/DSCN MKR DOCD: CPT | Performed by: INTERNAL MEDICINE

## 2025-03-17 NOTE — PATIENT INSTRUCTIONS

## 2025-03-17 NOTE — ASSESSMENT & PLAN NOTE
Most recent colonoscopy was in November 2024 with a few polyps.  Repeat due in 2029.  Dr. Julio Hargrove.

## 2025-03-17 NOTE — PROGRESS NOTES
Take 1 tablet by mouth daily Yes Kurtis Mccain DO   tadalafil (CIALIS) 5 MG tablet Take 1 tablet by mouth daily Yes Kurtis Mccain DO   dulaglutide (TRULICITY) 1.5 MG/0.5ML SC injection Inject 0.5 mLs into the skin once a week Yes Kurtis Mccain DO   omeprazole (PRILOSEC) 40 MG delayed release capsule Take 1 capsule by mouth daily Yes Kurtis Mccain DO   Continuous Glucose Sensor (FREESTYLE AVA 2 SENSOR) MISC CHANGE 1 SENSOR EVERY 14 DAYS Yes Kurtis Mccain DO   Insulin Pen Needle (BD PEN NEEDLE BREANNA 2ND GEN) 32G X 4 MM MISC USE AS DIRECTED TWICE DAILY Yes Kurtis Mccain DO   diclofenac (VOLTAREN) 75 MG EC tablet TAKE 1 TABLET BY MOUTH TWICE DAILY Yes Kurtis Mccain DO   Continuous Blood Gluc  (FREESTYLE AVA 2 READER) SERAFIN 1 each by Does not apply route 4 times daily (before meals and nightly) Yes Kurtis Mccain DO   blood glucose monitor strips Test 2 times a day & as needed for symptoms of irregular blood glucose. Test strips covered by patients insurance.  DX e11.9 Yes Kurtis Mccain DO   calcium carbonate 600 MG TABS tablet Take 1 tablet by mouth daily Yes Provider, MD Rafa De Anda (Including outside providers/suppliers regularly involved in providing care):   Patient Care Team:  Kurtis Mccain DO as PCP - General  Kurtis Mccain DO as PCP - Empaneled Provider  Lara Stevens RAFIQ as Pharmacist (Pharmacist)     Recommendations for Preventive Services Due: see orders and patient instructions/AVS.  Recommended screening schedule for the next 5-10 years is provided to the patient in written form: see Patient Instructions/AVS.     Reviewed and updated this visit:  Meds  Sexual Hx                  This encounter was performed under KURTIS garrison DO’s, direct supervision, 3/17/2025.

## 2025-03-17 NOTE — PROGRESS NOTES
tablet Take 1 tablet by mouth daily 90 tablet 1    dulaglutide (TRULICITY) 1.5 MG/0.5ML SC injection Inject 0.5 mLs into the skin once a week 2 mL 3    omeprazole (PRILOSEC) 40 MG delayed release capsule Take 1 capsule by mouth daily 90 capsule 3    Continuous Glucose Sensor (FREESTYLE AVA 2 SENSOR) MISC CHANGE 1 SENSOR EVERY 14 DAYS 4 each 5    Insulin Pen Needle (BD PEN NEEDLE BREANNA 2ND GEN) 32G X 4 MM MISC USE AS DIRECTED TWICE DAILY 200 each 3    diclofenac (VOLTAREN) 75 MG EC tablet TAKE 1 TABLET BY MOUTH TWICE DAILY 60 tablet 5    Continuous Blood Gluc  (FREESTYLE AVA 2 READER) SERAFIN 1 each by Does not apply route 4 times daily (before meals and nightly) 1 each 0    blood glucose monitor strips Test 2 times a day & as needed for symptoms of irregular blood glucose. Test strips covered by patients insurance.  DX e11.9 100 strip 0    calcium carbonate 600 MG TABS tablet Take 1 tablet by mouth daily       No current facility-administered medications for this visit.       Return in about 4 months (around 7/17/2025).

## 2025-03-17 NOTE — ASSESSMENT & PLAN NOTE
Improved with hemoglobin A1c down to 7%.  Continue Trulicity 1.5 mg weekly (with plan to increase to 3 mg weekly when he is due for his next refill).  Continue Humalog 75/25 mix 42 units with breakfast/20 units with dinner (goal will be to decrease dose of Humalog 75/25 mix to 38 units breakfast/16 units dinner with increased Trulicity dose).  Continue Jardiance 25 mg daily.

## 2025-04-25 DIAGNOSIS — E11.9 TYPE 2 DIABETES MELLITUS WITHOUT COMPLICATION, WITH LONG-TERM CURRENT USE OF INSULIN (HCC): ICD-10-CM

## 2025-04-25 DIAGNOSIS — Z79.4 TYPE 2 DIABETES MELLITUS WITHOUT COMPLICATION, WITH LONG-TERM CURRENT USE OF INSULIN (HCC): ICD-10-CM

## 2025-05-06 RX ORDER — PEN NEEDLE, DIABETIC 32GX 5/32"
NEEDLE, DISPOSABLE MISCELLANEOUS
Qty: 200 EACH | Refills: 3 | Status: SHIPPED | OUTPATIENT
Start: 2025-05-06

## 2025-05-15 DIAGNOSIS — N52.8 OTHER MALE ERECTILE DYSFUNCTION: ICD-10-CM

## 2025-05-15 RX ORDER — TADALAFIL 5 MG/1
5 TABLET ORAL DAILY
Qty: 90 TABLET | Refills: 1 | Status: SHIPPED | OUTPATIENT
Start: 2025-05-15

## 2025-05-21 ENCOUNTER — PATIENT MESSAGE (OUTPATIENT)
Dept: INTERNAL MEDICINE CLINIC | Age: 70
End: 2025-05-21

## 2025-05-21 DIAGNOSIS — E11.9 TYPE 2 DIABETES MELLITUS WITHOUT COMPLICATION, WITH LONG-TERM CURRENT USE OF INSULIN (HCC): Primary | ICD-10-CM

## 2025-05-21 DIAGNOSIS — Z79.4 TYPE 2 DIABETES MELLITUS WITHOUT COMPLICATION, WITH LONG-TERM CURRENT USE OF INSULIN (HCC): Primary | ICD-10-CM

## 2025-05-21 RX ORDER — HYDROCHLOROTHIAZIDE 12.5 MG/1
CAPSULE ORAL
Qty: 6 EACH | Refills: 3 | Status: SHIPPED | OUTPATIENT
Start: 2025-05-21

## 2025-05-28 ENCOUNTER — PATIENT MESSAGE (OUTPATIENT)
Dept: INTERNAL MEDICINE CLINIC | Age: 70
End: 2025-05-28

## 2025-05-28 DIAGNOSIS — E11.9 TYPE 2 DIABETES MELLITUS WITHOUT COMPLICATION, WITH LONG-TERM CURRENT USE OF INSULIN (HCC): Primary | ICD-10-CM

## 2025-05-28 DIAGNOSIS — Z79.4 TYPE 2 DIABETES MELLITUS WITHOUT COMPLICATION, WITH LONG-TERM CURRENT USE OF INSULIN (HCC): Primary | ICD-10-CM

## 2025-06-02 RX ORDER — OMEPRAZOLE 40 MG/1
40 CAPSULE, DELAYED RELEASE ORAL DAILY
Qty: 90 CAPSULE | Refills: 3 | Status: SHIPPED | OUTPATIENT
Start: 2025-06-02

## 2025-06-02 RX ORDER — INSULIN LISPRO 100 [IU]/ML
INJECTION, SUSPENSION SUBCUTANEOUS
Qty: 15 ML | Refills: 3 | Status: SHIPPED | OUTPATIENT
Start: 2025-06-02

## 2025-06-02 NOTE — TELEPHONE ENCOUNTER
Received refill request for : omeprazole (PRILOSEC) 40 MG delayed release capsule  from [facility] pharmacy.     Last OV: 03/17/25    Next OV: 08/18/25    Last Labs: 02/20/25    Last Filled: 06/03/24

## 2025-06-02 NOTE — TELEPHONE ENCOUNTER
Received refill request for insulin lispro protamine & lispro (HUMALOG MIX) (75-25) 100 UNIT per ML SUPN injection pen  from [facility] pharmacy.     Last OV: 03/17/25    Next OV: 08/18/25    Last Labs: 02/20/25    Last Filled: 03/07/25

## 2025-07-03 LAB
ANION GAP, EXTERNAL: 10
BUN, EXTERNAL: 18
BUN/CREATININE RATIO, EXTERNAL: NORMAL
CALCIUM, EXTERNAL: NORMAL
CHLORIDE, EXTERNAL: 103
CO2, EXTERNAL: 25
CREATININE, EXTERNAL: 0.94
EGFR IF AFA, EXTERNAL: NORMAL
EGFR IF NONAFRICAN AMERICAN: 86
ESTIMATED AVERAGE GLUCOSE: 166
GLUCOSE SER, EXTERNAL: 151
HBA1C MFR BLD: 7.4 %
POTASSIUM, EXTERNAL: 4.1
SODIUM, EXTERNAL: 138

## 2025-07-14 RX ORDER — EZETIMIBE 10 MG/1
10 TABLET ORAL DAILY
Qty: 90 TABLET | Refills: 3 | Status: SHIPPED | OUTPATIENT
Start: 2025-07-14

## 2025-08-11 RX ORDER — ROSUVASTATIN CALCIUM 40 MG/1
40 TABLET, COATED ORAL DAILY
Qty: 90 TABLET | Refills: 3 | Status: SHIPPED | OUTPATIENT
Start: 2025-08-11

## 2025-08-11 RX ORDER — VALSARTAN 320 MG/1
320 TABLET ORAL DAILY
Qty: 90 TABLET | Refills: 1 | Status: SHIPPED | OUTPATIENT
Start: 2025-08-11

## 2025-08-13 DIAGNOSIS — N52.8 OTHER MALE ERECTILE DYSFUNCTION: ICD-10-CM

## 2025-08-13 RX ORDER — TADALAFIL 5 MG/1
5 TABLET ORAL DAILY
Qty: 90 TABLET | Refills: 1 | Status: SHIPPED | OUTPATIENT
Start: 2025-08-13

## 2025-08-18 ENCOUNTER — OFFICE VISIT (OUTPATIENT)
Dept: INTERNAL MEDICINE CLINIC | Age: 70
End: 2025-08-18
Payer: MEDICARE

## 2025-08-18 ENCOUNTER — TELEPHONE (OUTPATIENT)
Dept: INTERNAL MEDICINE CLINIC | Age: 70
End: 2025-08-18

## 2025-08-18 VITALS
WEIGHT: 189.4 LBS | HEART RATE: 81 BPM | BODY MASS INDEX: 32.51 KG/M2 | OXYGEN SATURATION: 95 % | DIASTOLIC BLOOD PRESSURE: 70 MMHG | SYSTOLIC BLOOD PRESSURE: 108 MMHG

## 2025-08-18 DIAGNOSIS — I10 ESSENTIAL HYPERTENSION: Primary | ICD-10-CM

## 2025-08-18 DIAGNOSIS — R25.2 HAND CRAMPS: ICD-10-CM

## 2025-08-18 DIAGNOSIS — M50.30 DEGENERATIVE CERVICAL DISC: ICD-10-CM

## 2025-08-18 DIAGNOSIS — Z79.4 TYPE 2 DIABETES MELLITUS WITHOUT COMPLICATION, WITH LONG-TERM CURRENT USE OF INSULIN (HCC): ICD-10-CM

## 2025-08-18 DIAGNOSIS — E66.9 OBESITY (BMI 30-39.9): ICD-10-CM

## 2025-08-18 DIAGNOSIS — E11.9 TYPE 2 DIABETES MELLITUS WITHOUT COMPLICATION, WITH LONG-TERM CURRENT USE OF INSULIN (HCC): ICD-10-CM

## 2025-08-18 DIAGNOSIS — D37.3 APPENDICEAL TUMOR: ICD-10-CM

## 2025-08-18 DIAGNOSIS — M48.062 SPINAL STENOSIS OF LUMBAR REGION WITH NEUROGENIC CLAUDICATION: ICD-10-CM

## 2025-08-18 DIAGNOSIS — R25.2 LEG CRAMPS: ICD-10-CM

## 2025-08-18 PROBLEM — E66.813 OBESITY, CLASS 3 (HCC): Status: ACTIVE | Noted: 2025-08-18

## 2025-08-18 PROCEDURE — 1123F ACP DISCUSS/DSCN MKR DOCD: CPT | Performed by: INTERNAL MEDICINE

## 2025-08-18 PROCEDURE — 3074F SYST BP LT 130 MM HG: CPT | Performed by: INTERNAL MEDICINE

## 2025-08-18 PROCEDURE — 1159F MED LIST DOCD IN RCRD: CPT | Performed by: INTERNAL MEDICINE

## 2025-08-18 PROCEDURE — 3078F DIAST BP <80 MM HG: CPT | Performed by: INTERNAL MEDICINE

## 2025-08-18 PROCEDURE — 99214 OFFICE O/P EST MOD 30 MIN: CPT | Performed by: INTERNAL MEDICINE

## 2025-08-18 PROCEDURE — 3051F HG A1C>EQUAL 7.0%<8.0%: CPT | Performed by: INTERNAL MEDICINE

## 2025-08-18 RX ORDER — HYDROCODONE BITARTRATE AND ACETAMINOPHEN 5; 325 MG/1; MG/1
1 TABLET ORAL EVERY 12 HOURS PRN
Qty: 60 TABLET | Refills: 0 | Status: SHIPPED | OUTPATIENT
Start: 2025-08-18 | End: 2025-08-18 | Stop reason: SDUPTHER

## 2025-08-18 RX ORDER — HYDROCODONE BITARTRATE AND ACETAMINOPHEN 5; 325 MG/1; MG/1
1 TABLET ORAL EVERY 8 HOURS PRN
Qty: 21 TABLET | Refills: 0 | Status: SHIPPED | OUTPATIENT
Start: 2025-08-18 | End: 2025-08-25

## 2025-09-02 RX ORDER — INSULIN LISPRO 100 [IU]/ML
INJECTION, SUSPENSION SUBCUTANEOUS
Qty: 15 ML | Refills: 3 | Status: SHIPPED | OUTPATIENT
Start: 2025-09-02

## 2025-09-03 DIAGNOSIS — E11.9 TYPE 2 DIABETES MELLITUS WITHOUT COMPLICATION, WITH LONG-TERM CURRENT USE OF INSULIN (HCC): ICD-10-CM

## 2025-09-03 DIAGNOSIS — Z79.4 TYPE 2 DIABETES MELLITUS WITHOUT COMPLICATION, WITH LONG-TERM CURRENT USE OF INSULIN (HCC): ICD-10-CM

## 2025-09-03 RX ORDER — DULAGLUTIDE 3 MG/.5ML
INJECTION, SOLUTION SUBCUTANEOUS
Qty: 2 ML | Refills: 3 | Status: SHIPPED | OUTPATIENT
Start: 2025-09-03